# Patient Record
Sex: FEMALE | Race: WHITE | NOT HISPANIC OR LATINO | Employment: FULL TIME | URBAN - METROPOLITAN AREA
[De-identification: names, ages, dates, MRNs, and addresses within clinical notes are randomized per-mention and may not be internally consistent; named-entity substitution may affect disease eponyms.]

---

## 2017-05-10 ENCOUNTER — PATIENT MESSAGE (OUTPATIENT)
Dept: HEALTH INFORMATION MANAGEMENT | Facility: OTHER | Age: 62
End: 2017-05-10

## 2017-05-15 ENCOUNTER — PATIENT OUTREACH (OUTPATIENT)
Dept: HEALTH INFORMATION MANAGEMENT | Facility: OTHER | Age: 62
End: 2017-05-15

## 2017-05-15 NOTE — PROGRESS NOTES
Outcome: Left Message    WebIZ Checked & Epic Updated:  YES / NEEDS TO EST WITH NEW PCP    [facility] Verified: no    Attempt # 1

## 2017-05-25 NOTE — PROGRESS NOTES
Outcome: Left Message    WebIZ Checked & Epic Updated:  yes    [facility] Verified: no    Attempt # 4

## 2018-03-27 ENCOUNTER — OFFICE VISIT (OUTPATIENT)
Dept: MEDICAL GROUP | Age: 63
End: 2018-03-27
Payer: COMMERCIAL

## 2018-03-27 VITALS
SYSTOLIC BLOOD PRESSURE: 148 MMHG | BODY MASS INDEX: 31.71 KG/M2 | HEART RATE: 104 BPM | HEIGHT: 63 IN | OXYGEN SATURATION: 84 % | WEIGHT: 179 LBS | DIASTOLIC BLOOD PRESSURE: 80 MMHG | TEMPERATURE: 98.1 F

## 2018-03-27 DIAGNOSIS — E78.00 PURE HYPERCHOLESTEROLEMIA: ICD-10-CM

## 2018-03-27 DIAGNOSIS — L82.0 INFLAMED SEBORRHEIC KERATOSIS: ICD-10-CM

## 2018-03-27 DIAGNOSIS — Z00.00 PREVENTATIVE HEALTH CARE: ICD-10-CM

## 2018-03-27 DIAGNOSIS — G43.601 PERSISTENT MIGRAINE AURA WITH CEREBRAL INFARCTION AND STATUS MIGRAINOSUS, NOT INTRACTABLE (HCC): Chronic | ICD-10-CM

## 2018-03-27 DIAGNOSIS — Z12.31 VISIT FOR SCREENING MAMMOGRAM: ICD-10-CM

## 2018-03-27 DIAGNOSIS — J32.9 RECURRENT SINUSITIS: ICD-10-CM

## 2018-03-27 DIAGNOSIS — L57.0 AK (ACTINIC KERATOSIS): ICD-10-CM

## 2018-03-27 DIAGNOSIS — R03.0 ELEVATED BP WITHOUT DIAGNOSIS OF HYPERTENSION: ICD-10-CM

## 2018-03-27 DIAGNOSIS — M54.30 SCIATICA, UNSPECIFIED LATERALITY: ICD-10-CM

## 2018-03-27 DIAGNOSIS — H54.40 BLINDNESS, ONE EYE: ICD-10-CM

## 2018-03-27 DIAGNOSIS — I63.9 PERSISTENT MIGRAINE AURA WITH CEREBRAL INFARCTION AND STATUS MIGRAINOSUS, NOT INTRACTABLE (HCC): Chronic | ICD-10-CM

## 2018-03-27 PROCEDURE — 17003 DESTRUCT PREMALG LES 2-14: CPT | Performed by: FAMILY MEDICINE

## 2018-03-27 PROCEDURE — 99204 OFFICE O/P NEW MOD 45 MIN: CPT | Mod: 25 | Performed by: FAMILY MEDICINE

## 2018-03-27 PROCEDURE — 17000 DESTRUCT PREMALG LESION: CPT | Mod: 59 | Performed by: FAMILY MEDICINE

## 2018-03-27 PROCEDURE — 17110 DESTRUCTION B9 LES UP TO 14: CPT | Performed by: FAMILY MEDICINE

## 2018-03-27 RX ORDER — DIAZEPAM 5 MG/1
TABLET ORAL
Qty: 2 TAB | Refills: 0 | Status: SHIPPED | OUTPATIENT
Start: 2018-03-27 | End: 2018-04-27

## 2018-03-27 RX ORDER — CETIRIZINE HYDROCHLORIDE 10 MG/1
10 TABLET ORAL DAILY
Qty: 90 TAB | Refills: 1 | Status: SHIPPED | OUTPATIENT
Start: 2018-03-27 | End: 2018-07-16 | Stop reason: SDUPTHER

## 2018-03-27 RX ORDER — PREDNISONE 20 MG/1
TABLET ORAL
Qty: 12 TAB | Refills: 0 | Status: SHIPPED | OUTPATIENT
Start: 2018-03-27 | End: 2018-10-02

## 2018-03-27 ASSESSMENT — PATIENT HEALTH QUESTIONNAIRE - PHQ9
CLINICAL INTERPRETATION OF PHQ2 SCORE: 3
5. POOR APPETITE OR OVEREATING: 0 - NOT AT ALL
SUM OF ALL RESPONSES TO PHQ QUESTIONS 1-9: 9

## 2018-03-27 NOTE — ASSESSMENT & PLAN NOTE
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

## 2018-03-27 NOTE — PROGRESS NOTES
This medical record contains text that has been entered with the assistance of computer voice recognition and dictation software.  Therefore, it may contain unintended errors in text, spelling, punctuation, or grammar    Chief Complaint   Patient presents with   • Establish Care       Kyra Funez is a 62 y.o. 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 She denies any chest pain or back pain, headaches no change in vision or numbness or tingling anywhere.    Persistent migraine aura with cerebral infarction  She states that it depends on lighting  Maybe once per month she will suffer from an attack  She takes motrin and goes to a dark room  He she's black dots prior to attack    Recurrent sinusitis  States that she takes flonase and allegra daily       Visit for screening mammogram  Cannot afford health care,     Inflamed seborrheic keratosis  The patient is complaining of raised plaques which often it caught in her closing, she often scratches and a bleed and scab up.    Preventative health care  The patient is a 62-year-old female who presents to clinic to [facility] care. She has a significant past medical history is chronic low back pain with severe sciatica, migraines, obesity, left eye blindness, recurrent sinusitis, retinopathy.   The patient denied any chest pain, no sob, no salguero, no  pnd, no orthopnea, no headache, no changes in vision, no numbness or tingling, no nausea, no diarrhea, no abdominal pain, no fevers, no chills, no bright red blood per rectum, no   difficulty urinating, no burning during micturition, no depressed mood, no other concerns.      Current medicines (including changes today)  Current Outpatient Prescriptions   Medication Sig Dispense Refill   • Fexofenadine-Pseudoephedrine (ALLEGRA-D PO) Take  by mouth.     • cetirizine (ZYRTEC) 10 MG Tab Take 1 Tab by mouth every day. 90 Tab 1   • predniSONE (DELTASONE) 20 MG Tab Take 3 tabs po for 2 days then 2 tabs for 2 days then 1 for 2 days 12 Tab 0   • diazePAM (VALIUM) 5 MG Tab Take 1-2 tabs po one prior to MRI 2 Tab 0   • vitamin D (CHOLECALCIFEROL) 1000 UNIT TABS Take 3,000 Units by mouth every day.     • timolol (BETIMOL) 0.5 % ophthalmic solution Place 1 Drop in both eyes 2 times a day. use in affected eye(s)      • fluticasone (FLONASE) 50 MCG/ACT nasal spray USE TWO SPRAY(S) IN EACH NOSTRIL ONCE DAILY 16 Bottle 0   • amoxicillin (AMOXIL) 500 MG Cap Take 1 Cap by mouth 3 times a day. 30 Cap 3     No current facility-administered medications for this visit.      She  has a past medical history of Colitis; Glaucoma (2010); Iritis; Migraine; Neck pain; and Retinopathy. She also has no past medical history of ASTHMA; CAD (coronary artery disease); Cancer (CMS-HCC); Congestive heart failure (CMS-HCC); Diabetes; Hypertension; Infectious disease; Psychiatric disorder; Renal disorder; or Seizure disorder (CMS-HCC).  She  has a past surgical history that includes other and eye surgery.  Social History   Substance Use Topics   • Smoking status: Current Every Day Smoker   • Smokeless tobacco: Never Used      Comment: 10 cigarrettes a day   • Alcohol use No      Comment: celebrations only--newyears     Social History     Social History Narrative   • No narrative on file     Family History   Problem Relation Age of Onset   • Heart Disease Mother    • Heart Disease Father      Family Status   Relation Status   • Mother Alive   • Father    • Brother Alive   • Maternal Grandmother    • Maternal  "Grandfather    • Paternal Grandmother    • Paternal Grandfather          ROS    Please see hpi     All other systems reviewed and are negative     Objective:     Blood pressure 148/80, pulse (!) 104, temperature 36.7 °C (98.1 °F), height 1.6 m (5' 2.99\"), weight 81.2 kg (179 lb), SpO2 (!) 84 %, not currently breastfeeding. Body mass index is 31.72 kg/m².  Physical Exam:    Constitutional: Alert, no distress.  Eye: Equal, round and reactive, conjunctiva clear, lids normal.  ENMT: Lips without lesions, good dentition, oropharynx clear.  Neck: Trachea midline, no masses, no thyromegaly. No cervical or supraclavicular lymphadenopathy.  Respiratory: Unlabored respiratory effort, lungs clear to auscultation, no wheezes, no ronchi.  Cardiovascular: Normal S1, S2, no murmur, no edema.  Abdomen: Soft, non-tender, no masses, no hepatosplenomegaly.  Psych: Alert and oriented x3, normal affect and mood.    BACK Pain exam Thoracic and Lumbar Spine  Inspection of back and posture -revealed a normal exam  Range of motion = 180 degrees bilaterally  Palpation of the spine =NTT  no spasms noted, no lumbar spine tenderness, no saddle anasthesia, able to dorsiflex bilateral toes, 2+DTRs (patellar) bilaterally,  negative straight leg raise bilaterally both supine and seated,  Nontender bilateral erector spinae, normal gait   SKIN EXAM    Skin exam--reveals raised, red dried hemmorhage, plaques on back and arms        multiple lesions on bilateral forearms, hands, and chest, with evidence of of solar damage present , spotty hyperpigmentation, scattered telangiectasias, and  Xerosis      PROCEDURE: CRYOTHERAPY  Discussed risks and benefits of cryotherapy including but not limited to scarring, hyperpigmentation, hypopigmentation, hypertrophic scarring, keiloid scarring, incomplete or no resolution of lesions treated,pain, undesirable cosemetic result, blistering, potential need for additional treatment including " more invasive treatment. Patient expresses understanding and verbally acknowledges risks and consent to treatment. 2  applications of cryotherapy with 3 second freeze thaw cycle was applied to  4AK's and  all irritated and inflamed Seborrheic Keratoses. Patient tolerated procedure well. There were no complications. Aftercare instructions given.            Assessment and Plan:   The following treatment plan was discussed      1. Pure hypercholesterolemia    Need baseline labs    - COMP METABOLIC PANEL; Future  - CBC WITH DIFFERENTIAL; Future  - LIPID PROFILE; Future    2. Elevated BP without diagnosis of hypertension  Patient was given instructions to make a two-week blood pressure log  To measure his blood pressure morning and evening same time  Then send results back to us  We will consider specific management at that time      3. Blindness, one eye  Maintaining  Visits with  ophthalmology    4. Persistent migraine aura with cerebral infarction and status migrainosus, not intractable (CMS-HCC)  Patient has been stable with current management  We will make no changes for now      5. Recurrent sinusitis  I explained the importance of preventing antibiotic resistance  Educated patient that Washing the nasal cavities with saline reduces postnasal drainage, removes secretions, and rinses away allergens and irritants.  Patient was encouraged to use nasal saline rinses prior to using intranasal steroid  Will add Zyrtec and steroid taper   Oral prednisone  20 mg twice daily for five days, followed by 20 mg daily for five days (ie, total of 10 days of treatment).    If symptoms persist will consider antibiotics  And Leukotriene D4 (LTD4) receptor blockers including montelukast or zafirlukast     - cetirizine (ZYRTEC) 10 MG Tab; Take 1 Tab by mouth every day.  Dispense: 90 Tab; Refill: 1  - predniSONE (DELTASONE) 20 MG Tab; Take 3 tabs po for 2 days then 2 tabs for 2 days then 1 for 2 days  Dispense: 12 Tab; Refill: 0    6.  Visit for screening mammogram  Cannot afford for now    7. Sciatica, unspecified laterality        Back pain A/P  Patient informed that overall prognosis of back pain is favorable, he is to use ICE x 2 days, then switch to heat,  Nsaids,  and to ease back into normal activity as quickly as possible,  also to use proper lifting techniques (use legs not back), no clinical indication for imaging. Also counseled patient on low back stretching, hamstring stretching, core strengthening once no longer in acute pain.     - MR-LUMBAR SPINE-WITH & W/O; Future  - diazePAM (VALIUM) 5 MG Tab; Take 1-2 tabs po one prior to MRI  Dispense: 2 Tab; Refill: 0    8. Inflamed seborrheic keratosis  Patient tollerrated procedure well  There were no adverse events  Patient was given post procedure precautions       9. AK (actinic keratosis)  Patient tollerrated procedure well  There were no adverse events  Patient was given post procedure precautions       10. Preventative health care  Care has been established  We need baseline labs to establish a clinical profile  We will then consider daily prophylactic aspirin   In order to weigh  the benefits vs risk of GI bleed    We reviewed USPSTF guidelines        The decision to initiate low-dose aspirin use for the primary prevention of CVD and CRC in adults aged 60 to 69 years who have a 10% or greater 10-year CVD risk should be an individual one      This patient is due for mammogram  Up to date on colonoscopy   Requested Medical records to be sent to us  Denies intimate partner viloence            HEALTH MAINTENANCE:    Instructed to Follow up in clinic or ER for worsening symptoms, difficulty breathing, lack of expected recovery, or should new symptoms or problems arise.    Followup: Return in about 2 months (around 5/27/2018) for Reevaluation.       Once again this medical record contains text that has been entered with the assistance of computer voice recognition and dictation software.   Therefore, it may contain unintended errors in text, spelling, punctuation, or grammar

## 2018-03-27 NOTE — ASSESSMENT & PLAN NOTE
The patient is complaining of raised plaques which often it caught in her closing, she often scratches and a bleed and scab up.

## 2018-03-27 NOTE — ASSESSMENT & PLAN NOTE
She states that it depends on lighting  Maybe once per month she will suffer from an attack  She takes motrin and goes to a dark room  He she's black dots prior to attack

## 2018-03-27 NOTE — ASSESSMENT & PLAN NOTE
The patient is a 62-year-old female who presents to clinic to establish care. She has a significant past medical history is chronic low back pain with severe sciatica, migraines, obesity, left eye blindness, recurrent sinusitis, retinopathy.   The patient denied any chest pain, no sob, no salguero, no  pnd, no orthopnea, no headache, no changes in vision, no numbness or tingling, no nausea, no diarrhea, no abdominal pain, no fevers, no chills, no bright red blood per rectum, no  difficulty urinating, no burning during micturition, no depressed mood, no other concerns.

## 2018-03-27 NOTE — ASSESSMENT & PLAN NOTE
She states that she is always nervious at the doctors office and that's whey bp and hr are elevated. She denies any chest pain or back pain, headaches no change in vision or numbness or tingling anywhere.

## 2018-04-24 ENCOUNTER — APPOINTMENT (OUTPATIENT)
Dept: RADIOLOGY | Facility: MEDICAL CENTER | Age: 63
End: 2018-04-24
Attending: FAMILY MEDICINE
Payer: COMMERCIAL

## 2018-05-05 ENCOUNTER — HOSPITAL ENCOUNTER (OUTPATIENT)
Dept: LAB | Facility: MEDICAL CENTER | Age: 63
End: 2018-05-05
Attending: FAMILY MEDICINE
Payer: COMMERCIAL

## 2018-05-05 DIAGNOSIS — E78.00 PURE HYPERCHOLESTEROLEMIA: ICD-10-CM

## 2018-05-05 LAB
ALBUMIN SERPL BCP-MCNC: 3.5 G/DL (ref 3.2–4.9)
ALBUMIN/GLOB SERPL: 1 G/DL
ALP SERPL-CCNC: 79 U/L (ref 30–99)
ALT SERPL-CCNC: 15 U/L (ref 2–50)
ANION GAP SERPL CALC-SCNC: 8 MMOL/L (ref 0–11.9)
AST SERPL-CCNC: 17 U/L (ref 12–45)
BASOPHILS # BLD AUTO: 1 % (ref 0–1.8)
BASOPHILS # BLD: 0.07 K/UL (ref 0–0.12)
BILIRUB SERPL-MCNC: 0.7 MG/DL (ref 0.1–1.5)
BUN SERPL-MCNC: 15 MG/DL (ref 8–22)
CALCIUM SERPL-MCNC: 8.9 MG/DL (ref 8.5–10.5)
CHLORIDE SERPL-SCNC: 102 MMOL/L (ref 96–112)
CHOLEST SERPL-MCNC: 176 MG/DL (ref 100–199)
CO2 SERPL-SCNC: 32 MMOL/L (ref 20–33)
CREAT SERPL-MCNC: 0.74 MG/DL (ref 0.5–1.4)
EOSINOPHIL # BLD AUTO: 0.22 K/UL (ref 0–0.51)
EOSINOPHIL NFR BLD: 3 % (ref 0–6.9)
ERYTHROCYTE [DISTWIDTH] IN BLOOD BY AUTOMATED COUNT: 42.3 FL (ref 35.9–50)
GLOBULIN SER CALC-MCNC: 3.4 G/DL (ref 1.9–3.5)
GLUCOSE SERPL-MCNC: 83 MG/DL (ref 65–99)
HCT VFR BLD AUTO: 51 % (ref 37–47)
HDLC SERPL-MCNC: 58 MG/DL
HGB BLD-MCNC: 15.8 G/DL (ref 12–16)
IMM GRANULOCYTES # BLD AUTO: 0.01 K/UL (ref 0–0.11)
IMM GRANULOCYTES NFR BLD AUTO: 0.1 % (ref 0–0.9)
LDLC SERPL CALC-MCNC: 101 MG/DL
LYMPHOCYTES # BLD AUTO: 1.8 K/UL (ref 1–4.8)
LYMPHOCYTES NFR BLD: 24.5 % (ref 22–41)
MCH RBC QN AUTO: 29 PG (ref 27–33)
MCHC RBC AUTO-ENTMCNC: 31 G/DL (ref 33.6–35)
MCV RBC AUTO: 93.6 FL (ref 81.4–97.8)
MONOCYTES # BLD AUTO: 0.68 K/UL (ref 0–0.85)
MONOCYTES NFR BLD AUTO: 9.3 % (ref 0–13.4)
NEUTROPHILS # BLD AUTO: 4.56 K/UL (ref 2–7.15)
NEUTROPHILS NFR BLD: 62.1 % (ref 44–72)
NRBC # BLD AUTO: 0 K/UL
NRBC BLD-RTO: 0 /100 WBC
PLATELET # BLD AUTO: 287 K/UL (ref 164–446)
PMV BLD AUTO: 10.4 FL (ref 9–12.9)
POTASSIUM SERPL-SCNC: 4.3 MMOL/L (ref 3.6–5.5)
PROT SERPL-MCNC: 6.9 G/DL (ref 6–8.2)
RBC # BLD AUTO: 5.45 M/UL (ref 4.2–5.4)
SODIUM SERPL-SCNC: 142 MMOL/L (ref 135–145)
TRIGL SERPL-MCNC: 83 MG/DL (ref 0–149)
WBC # BLD AUTO: 7.3 K/UL (ref 4.8–10.8)

## 2018-05-05 PROCEDURE — 80061 LIPID PANEL: CPT

## 2018-05-05 PROCEDURE — 80053 COMPREHEN METABOLIC PANEL: CPT

## 2018-05-05 PROCEDURE — 36415 COLL VENOUS BLD VENIPUNCTURE: CPT

## 2018-05-05 PROCEDURE — 85025 COMPLETE CBC W/AUTO DIFF WBC: CPT

## 2018-05-09 ENCOUNTER — HOSPITAL ENCOUNTER (OUTPATIENT)
Dept: RADIOLOGY | Facility: MEDICAL CENTER | Age: 63
End: 2018-05-09
Attending: FAMILY MEDICINE
Payer: COMMERCIAL

## 2018-05-09 DIAGNOSIS — M54.30 SCIATICA, UNSPECIFIED LATERALITY: ICD-10-CM

## 2018-05-09 PROCEDURE — 72158 MRI LUMBAR SPINE W/O & W/DYE: CPT

## 2018-05-09 PROCEDURE — A9579 GAD-BASE MR CONTRAST NOS,1ML: HCPCS | Performed by: FAMILY MEDICINE

## 2018-05-09 PROCEDURE — 700117 HCHG RX CONTRAST REV CODE 255: Performed by: FAMILY MEDICINE

## 2018-05-09 RX ADMIN — GADODIAMIDE 18 ML: 287 INJECTION INTRAVENOUS at 16:01

## 2018-06-19 ENCOUNTER — OFFICE VISIT (OUTPATIENT)
Dept: MEDICAL GROUP | Age: 63
End: 2018-06-19
Payer: COMMERCIAL

## 2018-06-19 VITALS
SYSTOLIC BLOOD PRESSURE: 144 MMHG | TEMPERATURE: 99.5 F | DIASTOLIC BLOOD PRESSURE: 64 MMHG | HEART RATE: 101 BPM | OXYGEN SATURATION: 88 % | BODY MASS INDEX: 31.71 KG/M2 | HEIGHT: 63 IN | WEIGHT: 179 LBS

## 2018-06-19 DIAGNOSIS — L82.0 INFLAMED SEBORRHEIC KERATOSIS: ICD-10-CM

## 2018-06-19 DIAGNOSIS — F17.210 SMOKING GREATER THAN 30 PACK YEARS: ICD-10-CM

## 2018-06-19 DIAGNOSIS — L57.0 AK (ACTINIC KERATOSIS): ICD-10-CM

## 2018-06-19 DIAGNOSIS — M54.32 SCIATICA OF LEFT SIDE: ICD-10-CM

## 2018-06-19 PROCEDURE — 99214 OFFICE O/P EST MOD 30 MIN: CPT | Mod: 25 | Performed by: FAMILY MEDICINE

## 2018-06-19 PROCEDURE — 17003 DESTRUCT PREMALG LES 2-14: CPT | Performed by: FAMILY MEDICINE

## 2018-06-19 PROCEDURE — 17000 DESTRUCT PREMALG LESION: CPT | Mod: 59 | Performed by: FAMILY MEDICINE

## 2018-06-19 PROCEDURE — 17110 DESTRUCTION B9 LES UP TO 14: CPT | Performed by: FAMILY MEDICINE

## 2018-06-19 NOTE — ASSESSMENT & PLAN NOTE
She states the sciatica is really affecting her life.  It is worse when she is walking it is also painful when she sitting down improves with standing up.  The pain is about 5 out of 10 starts at the L4-L5 lumbar region radiates down the right gluteus sae.  He denies any loss of bladder or bowel function no numbness or tingling.  The pain is so bad that she will be open to surgery.  MRI does reveal moderate to severe canal stenosis.  Please see below    posterior paraspinous soft tissues are grossly unremarkable.   Impression       1.  No acute abnormality or abnormal enhancement lumbar spine.  2.  Advanced subacute to chronic degenerative changes at the L4-5 level secondary to spondylolisthesis which results in mild central canal stenosis, moderate right and moderate to severe left neural foraminal stenosis.  3.  Additional multilevel degenerative changes of the lumbar spine as described above.   Reading Provider Reading Date   Luca Villarreal M.D. May 9, 2018

## 2018-06-19 NOTE — ASSESSMENT & PLAN NOTE
Patient states that the irritating raised plaques on her stomach back and thorax have improved most of fallen off but she has several more that she needs to have removed.  They often get caught in her bra they often bleeds when scratched.

## 2018-06-19 NOTE — PROGRESS NOTES
This medical record contains text that has been entered with the assistance of computer voice recognition and dictation software.  Therefore, it may contain unintended errors in text, spelling, punctuation, or grammar    Chief Complaint   Patient presents with   • Results         Kyra Funez is a 63 y.o. female here evaluation and management of: Sciatica,      HPI:     Inflamed seborrheic keratosis  Patient states that the irritating raised plaques on her stomach back and thorax have improved most of fallen off but she has several more that she needs to have removed.  They often get caught in her bra they often bleeds when scratched.    Sciatica of left side  She states the sciatica is really affecting her life.  It is worse when she is walking it is also painful when she sitting down improves with standing up.  The pain is about 5 out of 10 starts at the L4-L5 lumbar region radiates down the right gluteus sae.  He denies any loss of bladder or bowel function no numbness or tingling.  The pain is so bad that she will be open to surgery.  MRI does reveal moderate to severe canal stenosis.  Please see below    posterior paraspinous soft tissues are grossly unremarkable.   Impression       1.  No acute abnormality or abnormal enhancement lumbar spine.  2.  Advanced subacute to chronic degenerative changes at the L4-5 level secondary to spondylolisthesis which results in mild central canal stenosis, moderate right and moderate to severe left neural foraminal stenosis.  3.  Additional multilevel degenerative changes of the lumbar spine as described above.   Reading Provider Reading Date   Luca Villarreal M.D. May 9, 2018         Smoking greater than 30 pack years  Patient continues to smoke she knows it is not good for her health but she cannot stop.  Denies any hemoptysis no unintentional weight loss.  Currently she is smoking half a day she has been for over 39 years.    Current medicines (including changes  today)  Current Outpatient Prescriptions   Medication Sig Dispense Refill   • Fexofenadine-Pseudoephedrine (ALLEGRA-D PO) Take  by mouth.     • cetirizine (ZYRTEC) 10 MG Tab Take 1 Tab by mouth every day. 90 Tab 1   • vitamin D (CHOLECALCIFEROL) 1000 UNIT TABS Take 3,000 Units by mouth every day.     • timolol (BETIMOL) 0.5 % ophthalmic solution Place 1 Drop in both eyes 2 times a day. use in affected eye(s)      • predniSONE (DELTASONE) 20 MG Tab Take 3 tabs po for 2 days then 2 tabs for 2 days then 1 for 2 days 12 Tab 0   • fluticasone (FLONASE) 50 MCG/ACT nasal spray USE TWO SPRAY(S) IN EACH NOSTRIL ONCE DAILY 16 Bottle 0   • amoxicillin (AMOXIL) 500 MG Cap Take 1 Cap by mouth 3 times a day. 30 Cap 3     No current facility-administered medications for this visit.      She  has a past medical history of Colitis; Glaucoma (2010); Iritis; Migraine; Neck pain; and Retinopathy. She also has no past medical history of ASTHMA; CAD (coronary artery disease); Cancer (HCC); Congestive heart failure (HCC); Diabetes; Hypertension; Infectious disease; Psychiatric disorder; Renal disorder; or Seizure disorder ([facility]).  She  has a past surgical history that includes other and eye surgery.  Social History   Substance Use Topics   • Smoking status: Current Every Day Smoker     Packs/day: 0.50   • Smokeless tobacco: Never Used      Comment: 17 cigarrettes a day   • Alcohol use No      Comment: celebrations only--newyears     Social History     Social History Narrative   • No narrative on file     Family History   Problem Relation Age of Onset   • Heart Disease Mother    • Heart Disease Father      Family Status   Relation Status   • Mother Alive   • Father    • Brother Alive   • Maternal Grandmother    • Maternal Grandfather    • Paternal Grandmother    • Paternal Grandfather          ROS    Please see hpi     All other systems reviewed and are negative     Objective:     Blood pressure  "144/64, pulse (!) 101, temperature 37.5 °C (99.5 °F), height 1.6 m (5' 3\"), weight 81.2 kg (179 lb), SpO2 88 %, not currently breastfeeding. Body mass index is 31.71 kg/m².  Physical Exam:    Constitutional: Alert, no distress.  Skin: Warm, dry, good turgor, no rashes in visible areas.  Eye: Equal, round and reactive, conjunctiva clear, lids normal.  ENMT: Lips without lesions, good dentition, oropharynx clear.  Neck: Trachea midline, no masses, no thyromegaly. No cervical or supraclavicular lymphadenopathy.  Respiratory: Unlabored respiratory effort, lungs clear to auscultation, no wheezes, no ronchi.  Cardiovascular: Normal S1, S2, no murmur, no edema.  Abdomen: Soft, non-tender, no masses, no hepatosplenomegaly.  Psych: Alert and oriented x3, normal affect and mood.      SKIN EXAM    ISK  Description--Several irregular, pigmented, verrucous surface plaques with dried hemmorhage      Size--greater than 0.3 cm on stomach thorax and back    SymptomsPatient has been complaining of lesions which have been irritating, flaking,getting caught on clothing and jewelry, painful and causing discomfort so is interested in removal.      AK  multiple lesions on face  and chest, with evidence of of solar damage present , spotty hyperpigmentation, scattered telangiectasias, and  Xerosis      PROCEDURE: CRYOTHERAPY  Discussed risks and benefits of cryotherapy including but not limited to scarring, hyperpigmentation, hypopigmentation, hypertrophic scarring, keiloid scarring, incomplete or no resolution of lesions treated,pain, undesirable cosemetic result, blistering, potential need for additional treatment including more invasive treatment. Patient expresses understanding and verbally acknowledges risks and consent to treatment. 2  applications of cryotherapy with 3 second freeze thaw cycle was applied to  3AK's and  all irritated and inflamed Seborrheic Keratoses. Patient tolerated procedure well. There were no complications. " Aftercare instructions given.          Assessment and Plan:   The following treatment plan was discussed      1. AK (actinic keratosis)  Patient tollerrated procedure well  There were no adverse events  Patient was given post procedure precautions       2. Inflamed seborrheic keratosis  Patient tollerrated procedure well  There were no adverse events  Patient was given post procedure precautions       3. Sciatica of left side  I believe that she might benefit from epidural steroid injections if not surgical correction.    - REFERRAL TO NEUROSURGERY    4. Smoking greater than 30 pack years      Patient was counseled on smoking cessation, we discussed the benefits of quitting including decrease risk of MI by 50% after one year of cessation, decreased risk of lung cancer after 12 years, one cigarette is enough to paralyze cilia for 24 hours leading to increase risk of pulmonary infection, etc.... .Also encouraged to set a stop date.    Lung cancer screening program also ordered    - US-ABDOMINAL AORTA SCREEN AAA; Future        HEALTH MAINTENANCE:    Instructed to Follow up in clinic or ER for worsening symptoms, difficulty breathing, lack of expected recovery, or should new symptoms or problems arise.    Followup: Return in about 3 months (around 9/19/2018) for Reevaluation.       Once again this medical record contains text that has been entered with the assistance of computer voice recognition and dictation software.  Therefore, it may contain unintended errors in text, spelling, punctuation, or grammar

## 2018-06-19 NOTE — ASSESSMENT & PLAN NOTE
Patient continues to smoke she knows it is not good for her health but she cannot stop.  Denies any hemoptysis no unintentional weight loss.  Currently she is smoking half a day she has been for over 39 years.

## 2018-06-26 ENCOUNTER — TELEPHONE (OUTPATIENT)
Dept: HEMATOLOGY ONCOLOGY | Facility: MEDICAL CENTER | Age: 63
End: 2018-06-26

## 2018-06-26 NOTE — TELEPHONE ENCOUNTER
Need additional information. 1st attempt to contact the patient,- left voicemail for patient requesting a return call to verify eligibility for LCSP.

## 2018-07-10 ENCOUNTER — TELEPHONE (OUTPATIENT)
Dept: MEDICAL GROUP | Age: 63
End: 2018-07-10

## 2018-07-10 ENCOUNTER — HOSPITAL ENCOUNTER (OUTPATIENT)
Dept: RADIOLOGY | Facility: MEDICAL CENTER | Age: 63
End: 2018-07-10
Attending: FAMILY MEDICINE
Payer: COMMERCIAL

## 2018-07-10 DIAGNOSIS — F17.210 SMOKING GREATER THAN 30 PACK YEARS: ICD-10-CM

## 2018-07-10 PROCEDURE — 76775 US EXAM ABDO BACK WALL LIM: CPT

## 2018-07-10 NOTE — TELEPHONE ENCOUNTER
Phone Number Called: [phone] (home)       Message: LVM for patient that we received her Ultrasound results and to call back to inform her of results.    Left Message for patient to call back: yes

## 2018-07-11 NOTE — TELEPHONE ENCOUNTER
Phone Number Called: [phone] (home) [phone] (work)      Message: Patient called in to MA line this morning, returning phone call she had received. . EPIC system was down, informed patient would contact her when system was up and running. Per patient okay to leave detail message. LVM for patient to inform of Dr. Addison result note:     Notes recorded by Demetri Britt M.D. on 7/10/2018 at 11:22 AM PDT  Please call patient and advise that his US did not show an anneurysm.    Demetri Addison MD  [place] [id number]    Patient also wanted refill on Allergy med, after looking at patient med list, she has 2 meds. LVM for her to call back an confirm which medication she is requesting for allergies.      Left Message for patient to call back: yes and N\A

## 2018-07-11 NOTE — TELEPHONE ENCOUNTER
Need additional information. 2nd attempt to contact the patient,- left voicemail for patient requesting a return call to verify eligibility for LCSP.

## 2018-07-16 DIAGNOSIS — J32.9 RECURRENT SINUSITIS: ICD-10-CM

## 2018-07-16 NOTE — TELEPHONE ENCOUNTER
Need additional information. 3rd attempt to contact the patient,- left voicemail and mailed a letter to the patient requesting a return call to verify eligibility for LCSP.

## 2018-07-17 RX ORDER — CETIRIZINE HYDROCHLORIDE 10 MG/1
10 TABLET ORAL DAILY
Qty: 90 TAB | Refills: 1 | Status: SHIPPED | OUTPATIENT
Start: 2018-07-17 | End: 2019-06-11 | Stop reason: SDUPTHER

## 2018-07-27 ENCOUNTER — TELEPHONE (OUTPATIENT)
Dept: MEDICAL GROUP | Age: 63
End: 2018-07-27

## 2018-07-27 DIAGNOSIS — M48.061 NEURAL FORAMINAL STENOSIS OF LUMBAR SPINE: ICD-10-CM

## 2018-07-27 NOTE — TELEPHONE ENCOUNTER
1. Caller Name: Kyra Funez                                           Call Back Number: [phone] (home) [phone] (work)      Patient approves a detailed voicemail message: yes    2. SPECIFIC Action To Be Taken: Referral pending, please sign.    3. Diagnosis/Clinical Reason for Request: Lumbar spine pain    4. Specialty & Provider Name/Lab/Imaging Location: Dr. Rica Waldrop (neurosurgery), [facility]    5. Is appointment scheduled for requested order/referral: yes - 10/1/18    Patient was informed they will receive a return phone call from the office ONLY if there are any questions before processing their request. Advised to call back if they haven't received a call from the referral department in 5 days.

## 2018-08-18 ENCOUNTER — HOSPITAL ENCOUNTER (OUTPATIENT)
Dept: RADIOLOGY | Facility: MEDICAL CENTER | Age: 63
End: 2018-08-18
Attending: PAIN MEDICINE
Payer: COMMERCIAL

## 2018-08-18 DIAGNOSIS — M54.5 LOW BACK PAIN, UNSPECIFIED BACK PAIN LATERALITY, UNSPECIFIED CHRONICITY, WITH SCIATICA PRESENCE UNSPECIFIED: ICD-10-CM

## 2018-08-18 PROCEDURE — 72110 X-RAY EXAM L-2 SPINE 4/>VWS: CPT

## 2018-10-02 ENCOUNTER — HOSPITAL ENCOUNTER (INPATIENT)
Facility: MEDICAL CENTER | Age: 63
LOS: 7 days | DRG: 189 | End: 2018-10-09
Attending: EMERGENCY MEDICINE | Admitting: HOSPITALIST
Payer: COMMERCIAL

## 2018-10-02 ENCOUNTER — OFFICE VISIT (OUTPATIENT)
Dept: URGENT CARE | Facility: CLINIC | Age: 63
End: 2018-10-02
Payer: COMMERCIAL

## 2018-10-02 ENCOUNTER — APPOINTMENT (OUTPATIENT)
Dept: RADIOLOGY | Facility: MEDICAL CENTER | Age: 63
DRG: 189 | End: 2018-10-02
Attending: EMERGENCY MEDICINE
Payer: COMMERCIAL

## 2018-10-02 VITALS
HEART RATE: 97 BPM | OXYGEN SATURATION: 80 % | RESPIRATION RATE: 20 BRPM | BODY MASS INDEX: 33.84 KG/M2 | HEIGHT: 63 IN | WEIGHT: 191 LBS | DIASTOLIC BLOOD PRESSURE: 84 MMHG | TEMPERATURE: 98.7 F | SYSTOLIC BLOOD PRESSURE: 132 MMHG

## 2018-10-02 DIAGNOSIS — J44.9 CHRONIC OBSTRUCTIVE PULMONARY DISEASE, UNSPECIFIED COPD TYPE (HCC): ICD-10-CM

## 2018-10-02 DIAGNOSIS — R09.02 HYPOXIA: ICD-10-CM

## 2018-10-02 DIAGNOSIS — J44.1 ACUTE EXACERBATION OF CHRONIC OBSTRUCTIVE PULMONARY DISEASE (COPD) (HCC): ICD-10-CM

## 2018-10-02 DIAGNOSIS — R10.84 GENERALIZED ABDOMINAL PAIN: ICD-10-CM

## 2018-10-02 DIAGNOSIS — I50.9 CONGESTIVE HEART FAILURE, UNSPECIFIED HF CHRONICITY, UNSPECIFIED HEART FAILURE TYPE (HCC): ICD-10-CM

## 2018-10-02 PROBLEM — J96.21 ACUTE ON CHRONIC RESPIRATORY FAILURE WITH HYPOXEMIA (HCC): Status: ACTIVE | Noted: 2018-10-02

## 2018-10-02 LAB
ALBUMIN SERPL BCP-MCNC: 3.4 G/DL (ref 3.2–4.9)
ALBUMIN/GLOB SERPL: 1 G/DL
ALP SERPL-CCNC: 95 U/L (ref 30–99)
ALT SERPL-CCNC: 34 U/L (ref 2–50)
ANION GAP SERPL CALC-SCNC: 11 MMOL/L (ref 0–11.9)
APPEARANCE UR: ABNORMAL
AST SERPL-CCNC: 27 U/L (ref 12–45)
BASOPHILS # BLD AUTO: 0.5 % (ref 0–1.8)
BASOPHILS # BLD: 0.06 K/UL (ref 0–0.12)
BILIRUB SERPL-MCNC: 1.2 MG/DL (ref 0.1–1.5)
BILIRUB UR STRIP-MCNC: NEGATIVE MG/DL
BNP SERPL-MCNC: 525 PG/ML (ref 0–100)
BUN SERPL-MCNC: 12 MG/DL (ref 8–22)
CALCIUM SERPL-MCNC: 8.8 MG/DL (ref 8.4–10.2)
CHLORIDE SERPL-SCNC: 97 MMOL/L (ref 96–112)
CO2 SERPL-SCNC: 28 MMOL/L (ref 20–33)
COLOR UR AUTO: ABNORMAL
CREAT SERPL-MCNC: 0.88 MG/DL (ref 0.5–1.4)
EOSINOPHIL # BLD AUTO: 0.07 K/UL (ref 0–0.51)
EOSINOPHIL NFR BLD: 0.6 % (ref 0–6.9)
ERYTHROCYTE [DISTWIDTH] IN BLOOD BY AUTOMATED COUNT: 45.1 FL (ref 35.9–50)
FLUAV RNA SPEC QL NAA+PROBE: NEGATIVE
FLUAV+FLUBV AG SPEC QL IA: NORMAL
FLUBV RNA SPEC QL NAA+PROBE: NEGATIVE
GLOBULIN SER CALC-MCNC: 3.5 G/DL (ref 1.9–3.5)
GLUCOSE SERPL-MCNC: 100 MG/DL (ref 65–99)
GLUCOSE UR STRIP.AUTO-MCNC: NEGATIVE MG/DL
HCT VFR BLD AUTO: 49.3 % (ref 37–47)
HGB BLD-MCNC: 15.6 G/DL (ref 12–16)
IMM GRANULOCYTES # BLD AUTO: 0.05 K/UL (ref 0–0.11)
IMM GRANULOCYTES NFR BLD AUTO: 0.4 % (ref 0–0.9)
KETONES UR STRIP.AUTO-MCNC: NEGATIVE MG/DL
LACTATE BLD-SCNC: 1.6 MMOL/L (ref 0.5–2)
LACTATE BLD-SCNC: 1.8 MMOL/L (ref 0.5–2)
LACTATE BLD-SCNC: 2.2 MMOL/L (ref 0.5–2)
LACTATE BLD-SCNC: 2.5 MMOL/L (ref 0.5–2)
LEUKOCYTE ESTERASE UR QL STRIP.AUTO: NEGATIVE
LYMPHOCYTES # BLD AUTO: 1.59 K/UL (ref 1–4.8)
LYMPHOCYTES NFR BLD: 12.9 % (ref 22–41)
MAGNESIUM SERPL-MCNC: 2.1 MG/DL (ref 1.5–2.5)
MCH RBC QN AUTO: 30 PG (ref 27–33)
MCHC RBC AUTO-ENTMCNC: 31.6 G/DL (ref 33.6–35)
MCV RBC AUTO: 94.8 FL (ref 81.4–97.8)
MONOCYTES # BLD AUTO: 1.03 K/UL (ref 0–0.85)
MONOCYTES NFR BLD AUTO: 8.4 % (ref 0–13.4)
NEUTROPHILS # BLD AUTO: 9.53 K/UL (ref 2–7.15)
NEUTROPHILS NFR BLD: 77.2 % (ref 44–72)
NITRITE UR QL STRIP.AUTO: NEGATIVE
NRBC # BLD AUTO: 0.02 K/UL
NRBC BLD-RTO: 0.2 /100 WBC
PH UR STRIP.AUTO: 6 [PH] (ref 5–8)
PLATELET # BLD AUTO: 263 K/UL (ref 164–446)
PMV BLD AUTO: 10.1 FL (ref 9–12.9)
POTASSIUM SERPL-SCNC: 4.1 MMOL/L (ref 3.6–5.5)
PROCALCITONIN SERPL-MCNC: <0.05 NG/ML
PROT SERPL-MCNC: 6.9 G/DL (ref 6–8.2)
PROT UR QL STRIP: 100 MG/DL
RBC # BLD AUTO: 5.2 M/UL (ref 4.2–5.4)
RBC UR QL AUTO: ABNORMAL
SIGNIFICANT IND 70042: NORMAL
SITE SITE: NORMAL
SODIUM SERPL-SCNC: 136 MMOL/L (ref 135–145)
SOURCE SOURCE: NORMAL
SP GR UR STRIP.AUTO: 1.03
TSH SERPL DL<=0.005 MIU/L-ACNC: 3.99 UIU/ML (ref 0.38–5.33)
UROBILINOGEN UR STRIP-MCNC: NEGATIVE MG/DL
WBC # BLD AUTO: 12.3 K/UL (ref 4.8–10.8)

## 2018-10-02 PROCEDURE — 87070 CULTURE OTHR SPECIMN AEROBIC: CPT

## 2018-10-02 PROCEDURE — 87040 BLOOD CULTURE FOR BACTERIA: CPT | Mod: 91

## 2018-10-02 PROCEDURE — 700101 HCHG RX REV CODE 250: Performed by: HOSPITALIST

## 2018-10-02 PROCEDURE — 94640 AIRWAY INHALATION TREATMENT: CPT

## 2018-10-02 PROCEDURE — A9270 NON-COVERED ITEM OR SERVICE: HCPCS | Performed by: HOSPITALIST

## 2018-10-02 PROCEDURE — 84145 PROCALCITONIN (PCT): CPT

## 2018-10-02 PROCEDURE — 700101 HCHG RX REV CODE 250: Performed by: EMERGENCY MEDICINE

## 2018-10-02 PROCEDURE — 83605 ASSAY OF LACTIC ACID: CPT | Mod: 91

## 2018-10-02 PROCEDURE — 94760 N-INVAS EAR/PLS OXIMETRY 1: CPT

## 2018-10-02 PROCEDURE — 83880 ASSAY OF NATRIURETIC PEPTIDE: CPT

## 2018-10-02 PROCEDURE — 700105 HCHG RX REV CODE 258: Performed by: EMERGENCY MEDICINE

## 2018-10-02 PROCEDURE — 87400 INFLUENZA A/B EACH AG IA: CPT

## 2018-10-02 PROCEDURE — 81002 URINALYSIS NONAUTO W/O SCOPE: CPT | Performed by: NURSE PRACTITIONER

## 2018-10-02 PROCEDURE — 80053 COMPREHEN METABOLIC PANEL: CPT

## 2018-10-02 PROCEDURE — 36415 COLL VENOUS BLD VENIPUNCTURE: CPT

## 2018-10-02 PROCEDURE — 83735 ASSAY OF MAGNESIUM: CPT

## 2018-10-02 PROCEDURE — 84443 ASSAY THYROID STIM HORMONE: CPT

## 2018-10-02 PROCEDURE — 87502 INFLUENZA DNA AMP PROBE: CPT

## 2018-10-02 PROCEDURE — 99285 EMERGENCY DEPT VISIT HI MDM: CPT

## 2018-10-02 PROCEDURE — 700105 HCHG RX REV CODE 258: Performed by: HOSPITALIST

## 2018-10-02 PROCEDURE — 700111 HCHG RX REV CODE 636 W/ 250 OVERRIDE (IP): Performed by: EMERGENCY MEDICINE

## 2018-10-02 PROCEDURE — 99215 OFFICE O/P EST HI 40 MIN: CPT | Performed by: NURSE PRACTITIONER

## 2018-10-02 PROCEDURE — 304561 HCHG STAT O2

## 2018-10-02 PROCEDURE — 85025 COMPLETE CBC W/AUTO DIFF WBC: CPT

## 2018-10-02 PROCEDURE — 700102 HCHG RX REV CODE 250 W/ 637 OVERRIDE(OP): Performed by: HOSPITALIST

## 2018-10-02 PROCEDURE — 96374 THER/PROPH/DIAG INJ IV PUSH: CPT

## 2018-10-02 PROCEDURE — 99222 1ST HOSP IP/OBS MODERATE 55: CPT | Performed by: HOSPITALIST

## 2018-10-02 PROCEDURE — 700111 HCHG RX REV CODE 636 W/ 250 OVERRIDE (IP): Performed by: HOSPITALIST

## 2018-10-02 PROCEDURE — 71045 X-RAY EXAM CHEST 1 VIEW: CPT

## 2018-10-02 PROCEDURE — 770020 HCHG ROOM/CARE - TELE (206)

## 2018-10-02 RX ORDER — NICOTINE 21 MG/24HR
21 PATCH, TRANSDERMAL 24 HOURS TRANSDERMAL
Status: DISCONTINUED | OUTPATIENT
Start: 2018-10-02 | End: 2018-10-09 | Stop reason: HOSPADM

## 2018-10-02 RX ORDER — POLYETHYLENE GLYCOL 3350 17 G/17G
1 POWDER, FOR SOLUTION ORAL
Status: DISCONTINUED | OUTPATIENT
Start: 2018-10-02 | End: 2018-10-09 | Stop reason: HOSPADM

## 2018-10-02 RX ORDER — SODIUM CHLORIDE 9 MG/ML
500 INJECTION, SOLUTION INTRAVENOUS
Status: COMPLETED | OUTPATIENT
Start: 2018-10-02 | End: 2018-10-02

## 2018-10-02 RX ORDER — AMOXICILLIN 250 MG
2 CAPSULE ORAL 2 TIMES DAILY
Status: DISCONTINUED | OUTPATIENT
Start: 2018-10-02 | End: 2018-10-09 | Stop reason: HOSPADM

## 2018-10-02 RX ORDER — MULTIVIT-MIN/IRON/FOLIC ACID/K 18-600-40
1 CAPSULE ORAL DAILY
COMMUNITY

## 2018-10-02 RX ORDER — IPRATROPIUM BROMIDE AND ALBUTEROL SULFATE 2.5; .5 MG/3ML; MG/3ML
3 SOLUTION RESPIRATORY (INHALATION)
Status: DISCONTINUED | OUTPATIENT
Start: 2018-10-02 | End: 2018-10-09 | Stop reason: HOSPADM

## 2018-10-02 RX ORDER — TIMOLOL MALEATE 5 MG/ML
1 SOLUTION/ DROPS OPHTHALMIC 2 TIMES DAILY
Status: DISCONTINUED | OUTPATIENT
Start: 2018-10-02 | End: 2018-10-09 | Stop reason: HOSPADM

## 2018-10-02 RX ORDER — METHYLPREDNISOLONE SODIUM SUCCINATE 40 MG/ML
40 INJECTION, POWDER, LYOPHILIZED, FOR SOLUTION INTRAMUSCULAR; INTRAVENOUS EVERY 6 HOURS
Status: DISCONTINUED | OUTPATIENT
Start: 2018-10-02 | End: 2018-10-04

## 2018-10-02 RX ORDER — AZITHROMYCIN 250 MG/1
250 TABLET, FILM COATED ORAL EVERY 24 HOURS
Status: COMPLETED | OUTPATIENT
Start: 2018-10-03 | End: 2018-10-06

## 2018-10-02 RX ORDER — MELOXICAM 7.5 MG/1
7.5 TABLET ORAL DAILY
COMMUNITY
End: 2020-02-25

## 2018-10-02 RX ORDER — BISACODYL 10 MG
10 SUPPOSITORY, RECTAL RECTAL
Status: DISCONTINUED | OUTPATIENT
Start: 2018-10-02 | End: 2018-10-09 | Stop reason: HOSPADM

## 2018-10-02 RX ORDER — IPRATROPIUM BROMIDE AND ALBUTEROL SULFATE 2.5; .5 MG/3ML; MG/3ML
3 SOLUTION RESPIRATORY (INHALATION) ONCE
Status: COMPLETED | OUTPATIENT
Start: 2018-10-02 | End: 2018-10-02

## 2018-10-02 RX ORDER — CETIRIZINE HYDROCHLORIDE 10 MG/1
10 TABLET ORAL DAILY
Status: DISCONTINUED | OUTPATIENT
Start: 2018-10-02 | End: 2018-10-09 | Stop reason: HOSPADM

## 2018-10-02 RX ORDER — AZITHROMYCIN 250 MG/1
500 TABLET, FILM COATED ORAL ONCE
Status: COMPLETED | OUTPATIENT
Start: 2018-10-02 | End: 2018-10-02

## 2018-10-02 RX ORDER — IPRATROPIUM BROMIDE AND ALBUTEROL SULFATE 2.5; .5 MG/3ML; MG/3ML
3 SOLUTION RESPIRATORY (INHALATION)
Status: DISCONTINUED | OUTPATIENT
Start: 2018-10-02 | End: 2018-10-07

## 2018-10-02 RX ADMIN — METHYLPREDNISOLONE SODIUM SUCCINATE 40 MG: 40 INJECTION, POWDER, FOR SOLUTION INTRAMUSCULAR; INTRAVENOUS at 13:37

## 2018-10-02 RX ADMIN — METHYLPREDNISOLONE SODIUM SUCCINATE 40 MG: 40 INJECTION, POWDER, FOR SOLUTION INTRAMUSCULAR; INTRAVENOUS at 17:50

## 2018-10-02 RX ADMIN — SODIUM CHLORIDE 500 ML: 9 INJECTION, SOLUTION INTRAVENOUS at 23:10

## 2018-10-02 RX ADMIN — NICOTINE 21 MG: 21 PATCH, EXTENDED RELEASE TRANSDERMAL at 13:38

## 2018-10-02 RX ADMIN — IPRATROPIUM BROMIDE AND ALBUTEROL SULFATE 3 ML: .5; 3 SOLUTION RESPIRATORY (INHALATION) at 19:34

## 2018-10-02 RX ADMIN — IPRATROPIUM BROMIDE AND ALBUTEROL SULFATE 3 ML: .5; 3 SOLUTION RESPIRATORY (INHALATION) at 22:57

## 2018-10-02 RX ADMIN — SODIUM CHLORIDE 3 G: 900 INJECTION INTRAVENOUS at 12:30

## 2018-10-02 RX ADMIN — METHYLPREDNISOLONE SODIUM SUCCINATE 40 MG: 40 INJECTION, POWDER, FOR SOLUTION INTRAMUSCULAR; INTRAVENOUS at 23:11

## 2018-10-02 RX ADMIN — TIMOLOL MALEATE 1 DROP: 5 SOLUTION OPHTHALMIC at 17:50

## 2018-10-02 RX ADMIN — ENOXAPARIN SODIUM 40 MG: 100 INJECTION SUBCUTANEOUS at 13:37

## 2018-10-02 RX ADMIN — CETIRIZINE HYDROCHLORIDE 10 MG: 10 TABLET, FILM COATED ORAL at 17:50

## 2018-10-02 RX ADMIN — IPRATROPIUM BROMIDE AND ALBUTEROL SULFATE 3 ML: .5; 3 SOLUTION RESPIRATORY (INHALATION) at 10:29

## 2018-10-02 RX ADMIN — SODIUM CHLORIDE 3 G: 900 INJECTION INTRAVENOUS at 17:51

## 2018-10-02 RX ADMIN — AZITHROMYCIN MONOHYDRATE 500 MG: 250 TABLET ORAL at 13:38

## 2018-10-02 RX ADMIN — IPRATROPIUM BROMIDE AND ALBUTEROL SULFATE 3 ML: .5; 3 SOLUTION RESPIRATORY (INHALATION) at 14:08

## 2018-10-02 ASSESSMENT — COGNITIVE AND FUNCTIONAL STATUS - GENERAL
SUGGESTED CMS G CODE MODIFIER DAILY ACTIVITY: CJ
DRESSING REGULAR UPPER BODY CLOTHING: A LITTLE
MOBILITY SCORE: 23
DAILY ACTIVITIY SCORE: 20
SUGGESTED CMS G CODE MODIFIER MOBILITY: CI
HELP NEEDED FOR BATHING: A LITTLE
TOILETING: A LITTLE
CLIMB 3 TO 5 STEPS WITH RAILING: A LITTLE
DRESSING REGULAR LOWER BODY CLOTHING: A LITTLE

## 2018-10-02 ASSESSMENT — ENCOUNTER SYMPTOMS
PND: 0
PHOTOPHOBIA: 0
EYE PAIN: 0
CLAUDICATION: 0
WEAKNESS: 1
FEVER: 0
CHILLS: 0
SHORTNESS OF BREATH: 1
NAUSEA: 0
PALPITATIONS: 0
STRIDOR: 0
HEMOPTYSIS: 0
SORE THROAT: 0
DEPRESSION: 0
BLOOD IN STOOL: 0
HEARTBURN: 0
NECK PAIN: 0
VOMITING: 0
NERVOUS/ANXIOUS: 0
FEVER: 0
MYALGIAS: 0
ABDOMINAL PAIN: 0
SPUTUM PRODUCTION: 1
WHEEZING: 1
HEADACHES: 0
FATIGUE: 1
COUGH: 1
CHILLS: 0
DOUBLE VISION: 0
DIZZINESS: 0
TREMORS: 0
SENSORY CHANGE: 0
TINGLING: 0
BLURRED VISION: 0
CONSTIPATION: 0
COUGH: 1
SHORTNESS OF BREATH: 1
BACK PAIN: 0
VOMITING: 0
ORTHOPNEA: 0
MEMORY LOSS: 0
NAUSEA: 0
SPEECH CHANGE: 0

## 2018-10-02 ASSESSMENT — PAIN SCALES - GENERAL
PAINLEVEL_OUTOF10: 0

## 2018-10-02 ASSESSMENT — COPD QUESTIONNAIRES
HAVE YOU SMOKED AT LEAST 100 CIGARETTES IN YOUR ENTIRE LIFE: YES
DO YOU EVER COUGH UP ANY MUCUS OR PHLEGM?: YES, EVERY DAY
DURING THE PAST 4 WEEKS HOW MUCH DID YOU FEEL SHORT OF BREATH: SOME OF THE TIME
COPD SCREENING SCORE: 8

## 2018-10-02 ASSESSMENT — PATIENT HEALTH QUESTIONNAIRE - PHQ9
1. LITTLE INTEREST OR PLEASURE IN DOING THINGS: NOT AT ALL
2. FEELING DOWN, DEPRESSED, IRRITABLE, OR HOPELESS: NOT AT ALL
SUM OF ALL RESPONSES TO PHQ9 QUESTIONS 1 AND 2: 0
SUM OF ALL RESPONSES TO PHQ9 QUESTIONS 1 AND 2: 0
1. LITTLE INTEREST OR PLEASURE IN DOING THINGS: NOT AT ALL
2. FEELING DOWN, DEPRESSED, IRRITABLE, OR HOPELESS: NOT AT ALL

## 2018-10-02 ASSESSMENT — LIFESTYLE VARIABLES
EVER_SMOKED: YES
ALCOHOL_USE: NO

## 2018-10-02 NOTE — H&P
Hospital Medicine History and Physical    Date of Service  10/2/2018    Chief Complaint  Chief Complaint   Patient presents with   • Shortness of Breath     x1 week.  72% RA.  Congested.     • Sent from Urgent Care       History of Presenting Illness  63 y.o. female with a past medical history of HLD, Vitamin D Deficiency, Sciatica and Glaucoma presented 10/2/2018 initially presented to the Urgent care for flue life symptoms. She was unfortunately found to have a pulse-ox of 76%. As a result she was directed to the ER. In the ER patient has been complaining of of shortness of breath, with onset  1 week ago, progressively worsening, with feeling of congestion, productive cough, subjective chills, fatigue. She also has right elbow swelling which she was treated for bursitis by her physician. At this point patient will be admitted for evaluation of shortness of breath, with active treatment utilizing antibiotics, IV steroids and breathing treatments. Patient denies having chest pain.                Primary Care Physician  Demetri Britt M.D.    Consultants  None    Code Status  Code: Full code    Review of Systems  Review of Systems   Constitutional: Positive for malaise/fatigue. Negative for chills and fever.   HENT: Negative for congestion, hearing loss, sore throat and tinnitus.    Eyes: Negative for blurred vision, double vision, photophobia and pain.   Respiratory: Positive for cough, sputum production, shortness of breath and wheezing. Negative for hemoptysis and stridor.    Cardiovascular: Negative for chest pain, palpitations, orthopnea, claudication and PND.   Gastrointestinal: Negative for blood in stool, constipation, heartburn, melena, nausea and vomiting.   Genitourinary: Negative for dysuria, frequency and urgency.   Musculoskeletal: Negative for back pain, myalgias and neck pain.   Neurological: Positive for weakness. Negative for dizziness, tingling, tremors, sensory change, speech change  "and headaches.   Psychiatric/Behavioral: Negative for depression, memory loss and suicidal ideas. The patient is not nervous/anxious.           Past Medical History  Past Medical History:   Diagnosis Date   • Glaucoma 2010   • Colitis     e coli and salmonella   • Iritis    • Migraine    • Neck pain    • Retinopathy        Surgical History  Past Surgical History:   Procedure Laterality Date   • EYE SURGERY      multiple xiomara eye surgeries, lens implants   • OTHER      irridotomy both eyes & lense implants       Medications  No current facility-administered medications on file prior to encounter.      Current Outpatient Prescriptions on File Prior to Encounter   Medication Sig Dispense Refill   • cetirizine (ZYRTEC) 10 MG Tab Take 1 Tab by mouth every day. 90 Tab 1   • amoxicillin (AMOXIL) 500 MG Cap Take 1 Cap by mouth 3 times a day. 30 Cap 3   • timolol (BETIMOL) 0.5 % ophthalmic solution Place 1 Drop in both eyes 2 times a day. use in affect         Family History  Family History   Problem Relation Age of Onset   • Heart Disease Mother    • Heart Disease Father        Social History  Social History   Substance Use Topics   • Smoking status: Current Every Day Smoker     Packs/day: 0.50   • Smokeless tobacco: Never Used      Comment: 17 cigarrettes a day   • Alcohol use No      Comment: celebrations only--newyears       Allergies  Allergies   Allergen Reactions   • Iodine Shortness of Breath   • Latex      \"was told from her dentist, not sure reaction\"   Paper tape okay   • Maxepa Hives   • Niacin Shortness of Breath   • Shellfish Allergy         Physical Exam  Laboratory   Hemodynamics  Temp (24hrs), Av.5 °C (99.5 °F), Min:37.5 °C (99.5 °F), Max:37.5 °C (99.5 °F)   Temperature: 37.5 °C (99.5 °F)  Pulse  Av  Min: 80  Max: 122 Heart Rate (Monitored): 77  Blood Pressure : (!) 164/91, NIBP: 139/75      Respiratory      Respiration: 18, Pulse Oximetry: 96 %, O2 Daily Delivery Respiratory : Silicone Nasal " Cannula     Given By:: Mouthpiece  RUL Breath Sounds: Clear, RML Breath Sounds: Clear, RLL Breath Sounds: Diminished, GEN Breath Sounds: Clear, LLL Breath Sounds: Diminished    Physical Exam   Constitutional: She is oriented to person, place, and time. She appears well-developed and well-nourished. No distress.   HENT:   Head: Normocephalic and atraumatic.   Mouth/Throat: No oropharyngeal exudate.   Eyes: Pupils are equal, round, and reactive to light. Conjunctivae are normal. Right eye exhibits no discharge. No scleral icterus.   Neck: Neck supple. No JVD present. No thyromegaly present.   Cardiovascular: Normal rate and intact distal pulses.    No murmur heard.  Pulmonary/Chest: Effort normal. No stridor. No respiratory distress. She has wheezes (b/l scattered wheezing). She has no rales.   Poor air movement bilateral lung bases    Abdominal: Soft. Bowel sounds are normal. She exhibits no distension. There is no tenderness. There is no rebound.   Musculoskeletal: Normal range of motion. She exhibits no edema.   Neurological: She is alert and oriented to person, place, and time. No cranial nerve deficit.   Skin: Skin is warm. No rash noted. She is not diaphoretic. No erythema. No pallor.   Psychiatric: She has a normal mood and affect. Her behavior is normal. Thought content normal.         Assessment/Plan  * Acute on chronic respiratory failure with hypoxemia (HCC)- (present on admission)   Assessment & Plan    Multifactorial including component of COPD due to smoking, possible early pneumonia (clinical diagnosis with greenish productive cough, elevated leukocytosis) & Elevated BNP with unimpressive CXR.  IV Unasyn and Azithromycin started.  Blood and sputum cultures pending  Pro-calcitonin ordered and pending  IV steroids started, wean as tolerated  Echocardiogram ordered.   Continue RT protocol, duo nebs, Pep therapy if warranted, and incentive spirometry.           Acute exacerbation of chronic obstructive  pulmonary disease (COPD) ([facility])   Assessment & Plan    Smoker >15 cigarettes a day times 20 years  Audible wheezing on exam, hypoxia  IV steroids, RT protocol          Hyperlipidemia- (present on admission)   Assessment & Plan    Diet controlled        Retinopathy- (present on admission)   Assessment & Plan    Born with retinopathy left eye blind, stable        Glaucoma- (present on admission)   Assessment & Plan    Continue timolol eyedrops no acute issues            I anticipate this patient will require at least two midnights for appropriate medical management, necessitating inpatient admission.    Prophylaxis: lovenox    Recent Labs      10/02/18   0947   WBC  12.3*   RBC  5.20   HEMOGLOBIN  15.6   HEMATOCRIT  49.3*   MCV  94.8   MCH  30.0   MCHC  31.6*   RDW  45.1   PLATELETCT  263   MPV  10.1     Recent Labs      10/02/18   0947   SODIUM  136   POTASSIUM  4.1   CHLORIDE  97   CO2  28   GLUCOSE  100*   BUN  12   CREATININE  0.88   CALCIUM  8.8     Recent Labs      10/02/18   0947   ALTSGPT  34   ASTSGOT  27   ALKPHOSPHAT  95   TBILIRUBIN  1.2   GLUCOSE  100*         Recent Labs      10/02/18   0947   BNPBTYPENAT  525*         No results found for: TROPONINI    Imaging  DX-CHEST-PORTABLE (1 VIEW)   Final Result      No acute cardiopulmonary findings.

## 2018-10-02 NOTE — PROGRESS NOTES
Subjective:      Kyra Funez is a 63 y.o. female who presents with Abdominal Pain (C/o abd pain, feeling weak, urinary urgency, taking left over amoxicillin x4 days)    Past Medical History:   Diagnosis Date   • Colitis     e coli and salmonella   • Glaucoma 2/25/2010   • Iritis    • Migraine    • Neck pain    • Retinopathy      Social History     Social History   • Marital status: Single     Spouse name: N/A   • Number of children: N/A   • Years of education: N/A     Occupational History   • Not on file.     Social History Main Topics   • Smoking status: Current Every Day Smoker     Packs/day: 0.50   • Smokeless tobacco: Never Used      Comment: 17 cigarrettes a day   • Alcohol use No      Comment: celebrations only--newyears   • Drug use: No   • Sexual activity: Not on file     Other Topics Concern   • Not on file     Social History Narrative   • No narrative on file     Family History   Problem Relation Age of Onset   • Heart Disease Mother    • Heart Disease Father       allergies: Iodine; Maxepa; Niacin; and Shellfish allergy    Patient is a 63-year-old female who presents today with complaint of generalized weakness and shortness of breath. States that she just does not feel well. Denies any pain at this time. States that she thought she might be getting an infection in her sinuses so she started some leftover amoxicillin at home which has not helped. She denies chest pain. States that she does have leg cramping but no acute leg pain or swelling. She is a smoker, less than one pack per day.          Other   This is a new problem. The current episode started in the past 7 days. The problem has been gradually worsening. Associated symptoms include coughing and fatigue. Pertinent negatives include no abdominal pain, chills, fever, nausea or vomiting. Nothing aggravates the symptoms. Treatments tried: amoxil. The treatment provided no relief.       Review of Systems   Constitutional: Positive for fatigue and  "malaise/fatigue. Negative for chills and fever.   Respiratory: Positive for cough and shortness of breath.    Gastrointestinal: Negative for abdominal pain, nausea and vomiting.   Genitourinary: Negative for dysuria.   Skin: Negative.    All other systems reviewed and are negative.         Objective:     /84 (BP Location: Right arm, Patient Position: Sitting, BP Cuff Size: Large adult)   Pulse 97   Temp 37.1 °C (98.7 °F) (Temporal)   Resp 20   Ht 1.6 m (5' 3\")   Wt 86.6 kg (191 lb)   SpO2 (!) 80%   BMI 33.83 kg/m²      Physical Exam   Constitutional: She is oriented to person, place, and time. She appears well-developed and well-nourished.   HENT:   Head: Normocephalic.   Right Ear: External ear normal.   Left Ear: External ear normal.   Eyes: Pupils are equal, round, and reactive to light. EOM are normal.   Neck: Normal range of motion. Neck supple.   Cardiovascular: Regular rhythm and normal heart sounds.       Pulmonary/Chest:   Labored breathing   Musculoskeletal: Normal range of motion.   No obvious swelling in the legs, no calf pain, gaurav's sign negative    Neurological: She is alert and oriented to person, place, and time.   Skin: Skin is warm and dry. Capillary refill takes less than 2 seconds.   Psychiatric: She has a normal mood and affect. Her behavior is normal. Judgment and thought content normal.   Vitals reviewed.    I rechecked patient's oxygen saturation and found him to be 76-80% on room air. Heart rate is elevated and patient's breathing appears to be labored.    UA: negative leukocytes, negative nitrates, negative blood    EKG: Sinus rhythm, rate 93. No Acute ST elevation or depression noted.           Discussed with patient that I'm concerned for possibility of pulmonary embolus, pneumonia, or cardiac related symptoms. At this time I advised patient that she needs a higher level of care than what I can safely reasonable to provide. The urgent care. Patient verbalized " understanding and agreement with plan of care. Consulted Dr. Lindo at [facility] emergency room and he agreed to accept transfer the patient.          Assessment/Plan:     1. Weakness  2. Hypoxia  3. Tachycardia  [facility]   REMSA

## 2018-10-02 NOTE — PROGRESS NOTES
2 RN skin check performed with REGGIE Streeter. Patient noted to have a bruise to the left knee, and redness to the right elbow that is dressed in coban and gauze for previous case of bursitis. Otherwise skin surfaces are intact.

## 2018-10-02 NOTE — ED TRIAGE NOTES
Kyra Ashley Funez 63 y.o. female     Chief Complaint   Patient presents with   • Shortness of Breath     x1 week.  72% RA.  Congested.     • Sent from Urgent Care      Sepsis score 4.  Protocol initiated.  Chart placed for ERP eval.

## 2018-10-02 NOTE — FLOWSHEET NOTE
10/02/18 1029   Interdisciplinary Plan of Care-Goals (Indications)   Bronchodilator Indications Strong Subjective / Objective Improvement   Interdisciplinary Plan of Care-Outcomes    Bronchodilator Outcome Patient at Stable Baseline   Education   Education Yes - Pt. / Family has been Instructed in use of Respiratory Medications and Adverse Reactions   RT Assessment of Delivered Medications   Evaluation of Medication Delivery Daily Yes-- Pt /Family has been Instructed in use of Respiratory Medications and Adverse Reactions   SVN Group   #SVN Performed Yes   Given By: Mouthpiece   Respiratory WDL   Respiratory (WDL) X   Chest Exam   Respiration 13   Pulse 89   Heart Rate (Monitored) 86   Breath Sounds   Pre/Post Intervention Pre Intervention Assessment   RUL Breath Sounds Clear   RML Breath Sounds Clear   RLL Breath Sounds Diminished   GEN Breath Sounds Clear   LLL Breath Sounds Diminished   Secretions   Cough Moist;Non Productive   Oximetry   #Pulse Oximetry (Single Determination) Yes   Continuous Oximetry Yes   Oxygen   Pulse Oximetry 98 %   O2 (LPM) 4   O2 Daily Delivery Respiratory  Silicone Nasal Cannula

## 2018-10-02 NOTE — FLOWSHEET NOTE
10/02/18 1410   Events/Summary/Plan   Events/Summary/Plan Tx given   Non-Invasive Resp Device Site Inspection Completed Intact   Interdisciplinary Plan of Care-Goals (Indications)   Bronchodilator Indications History / Diagnosis   Interdisciplinary Plan of Care-Outcomes    Bronchodilator Outcome Diminished Wheezing and Volume of Air Movement Increased   Education   Education Yes - Pt. / Family has been Instructed in use of Respiratory Medications and Adverse Reactions   RT Assessment of Delivered Medications   Evaluation of Medication Delivery Daily Yes-- Pt /Family has been Instructed in use of Respiratory Medications and Adverse Reactions   SVN Group   #SVN Performed Yes   Given By: Mouthpiece   Date SVN Last Changed 10/02/18   Date SVN Next Change Due (Q 7 Days) 11/01/18   Chest Exam   Respiration 18   Heart Rate (Monitored) 75   Breath Sounds   Pre/Post Intervention Post Intervention Assessment   RUL Breath Sounds Diminished;Expiratory Wheezes   RML Breath Sounds Diminished;Expiratory Wheezes   RLL Breath Sounds Diminished   GEN Breath Sounds Diminished;Expiratory Wheezes   LLL Breath Sounds Diminished   Secretions   Cough Productive   How Sputum Obtained Expectorated   Sputum Amount Small   Sputum Color Clear;Yellow   Sputum Consistency Thin;Thick   Oximetry   #Pulse Oximetry (Single Determination) Yes   Oxygen   Home O2 Use Prior To Admission? No   Pulse Oximetry 95 %   O2 (LPM) 2   O2 Daily Delivery Respiratory  Silicone Nasal Cannula

## 2018-10-02 NOTE — ED PROVIDER NOTES
ED Provider Note    CHIEF COMPLAINT  Chief Complaint   Patient presents with   • Shortness of Breath     x1 week.  72% RA.  Congested.     • Sent from Urgent Care       HPI  Kyra Funez is a 63 y.o. female who presents with shortness of breath.  She has had a for about 1 week.  She states she has a history of allergies and sinus infections.  She has been taking allergy medication thought she had the flu felt fatigued.  She missed work for 2 days started herself on amoxicillin that she has had for sinus infection in the past and thinks that has improved it.  She went to urgent care for complaint of flu symptoms and was noted to have a pulse ox of 76% was sent here for evaluation.  She has no sore throat no cough she does have dyspnea on exertion no chest pain arm pain neck pain or back pain.  No sputum production some leg swelling.  She also complains of a swelling bursitis on her right elbow that is been treated by her back physician.  They put her on medications but did not open it.  She is also being treated for spinal stenosis by the back specialist.  No history of diabetes or heart disease.  She is a pack per day smoker no alcohol or drugs.  All other systems are negative    REVIEW OF SYSTEMS  See HPI for further details    PAST MEDICAL HISTORY  Past Medical History:   Diagnosis Date   • Colitis     e coli and salmonella   • Glaucoma 2/25/2010   • Iritis    • Migraine    • Neck pain    • Retinopathy        FAMILY HISTORY  Family History   Problem Relation Age of Onset   • Heart Disease Mother    • Heart Disease Father        SOCIAL HISTORY  Social History     Social History   • Marital status: Single     Spouse name: N/A   • Number of children: N/A   • Years of education: N/A     Social History Main Topics   • Smoking status: Current Every Day Smoker     Packs/day: 0.50   • Smokeless tobacco: Never Used      Comment: 17 cigarrettes a day   • Alcohol use No      Comment: celebrations only--newyears   • Drug  "use: No   • Sexual activity: Not on file     Other Topics Concern   • Not on file     Social History Narrative   • No narrative on file       SURGICAL HISTORY  Past Surgical History:   Procedure Laterality Date   • EYE SURGERY      multiple xiomara eye surgeries, lens implants   • OTHER      irridotomy both eyes & lense implants       CURRENT MEDICATIONS  Home Medications    **Home medications have not yet been reviewed for this encounter**         ALLERGIES  Allergies   Allergen Reactions   • Iodine Shortness of Breath   • Maxepa Hives   • Niacin Shortness of Breath   • Shellfish Allergy        PHYSICAL EXAM  VITAL SIGNS: BP (!) 164/91   Pulse (!) 122   Temp 37.5 °C (99.5 °F)   Resp 20   Ht 1.626 m (5' 4\")   Wt 87.1 kg (192 lb 0.3 oz)   SpO2 96%   BMI 32.96 kg/m²     Constitutional: Patient is alert and oriented x3 in mild distress   HENT: Slightly dry mucous membranes  Eyes:   No conjunctivitis or icterus  Neck: Trach is midline no palpable thyroid  Lymphatic: Negative anterior cervical lymphadenopathy  Cardiovascular: Normal heart rate tachycardic no murmur  Thorax & Lungs: Decreased breath sounds throughout left greater than right with wheezing  Back: No CVA tenderness  Abdomen: Soft nontender palpation  Neurologic: Normal motor sensation  Extremities: 1-2+ pretibial edema  Psychiatric: Affect normal, Judgment normal, Mood normal.     Results for orders placed or performed during the hospital encounter of 10/02/18   Lactic acid (lactate)   Result Value Ref Range    Lactic Acid 2.2 (H) 0.5 - 2.0 mmol/L   CBC WITH DIFFERENTIAL   Result Value Ref Range    WBC 12.3 (H) 4.8 - 10.8 K/uL    RBC 5.20 4.20 - 5.40 M/uL    Hemoglobin 15.6 12.0 - 16.0 g/dL    Hematocrit 49.3 (H) 37.0 - 47.0 %    MCV 94.8 81.4 - 97.8 fL    MCH 30.0 27.0 - 33.0 pg    MCHC 31.6 (L) 33.6 - 35.0 g/dL    RDW 45.1 35.9 - 50.0 fL    Platelet Count 263 164 - 446 K/uL    MPV 10.1 9.0 - 12.9 fL    Neutrophils-Polys 77.20 (H) 44.00 - 72.00 %    " Lymphocytes 12.90 (L) 22.00 - 41.00 %    Monocytes 8.40 0.00 - 13.40 %    Eosinophils 0.60 0.00 - 6.90 %    Basophils 0.50 0.00 - 1.80 %    Immature Granulocytes 0.40 0.00 - 0.90 %    Nucleated RBC 0.20 /100 WBC    Neutrophils (Absolute) 9.53 (H) 2.00 - 7.15 K/uL    Lymphs (Absolute) 1.59 1.00 - 4.80 K/uL    Monos (Absolute) 1.03 (H) 0.00 - 0.85 K/uL    Eos (Absolute) 0.07 0.00 - 0.51 K/uL    Baso (Absolute) 0.06 0.00 - 0.12 K/uL    Immature Granulocytes (abs) 0.05 0.00 - 0.11 K/uL    NRBC (Absolute) 0.02 K/uL   COMP METABOLIC PANEL   Result Value Ref Range    Sodium 136 135 - 145 mmol/L    Potassium 4.1 3.6 - 5.5 mmol/L    Chloride 97 96 - 112 mmol/L    Co2 28 20 - 33 mmol/L    Anion Gap 11.0 0.0 - 11.9    Glucose 100 (H) 65 - 99 mg/dL    Bun 12 8 - 22 mg/dL    Creatinine 0.88 0.50 - 1.40 mg/dL    Calcium 8.8 8.4 - 10.2 mg/dL    AST(SGOT) 27 12 - 45 U/L    ALT(SGPT) 34 2 - 50 U/L    Alkaline Phosphatase 95 30 - 99 U/L    Total Bilirubin 1.2 0.1 - 1.5 mg/dL    Albumin 3.4 3.2 - 4.9 g/dL    Total Protein 6.9 6.0 - 8.2 g/dL    Globulin 3.5 1.9 - 3.5 g/dL    A-G Ratio 1.0 g/dL   BTYPE NATRIURETIC PEPTIDE   Result Value Ref Range    B Natriuretic Peptide 525 (H) 0 - 100 pg/mL   INFLUENZA RAPID   Result Value Ref Range    Significant Indicator NEG     Source RESP     Site RESPIRATORY     Rapid Influenza A-B       Negative for Influenza A and Influenza B antigens.  Infection due to influenza A or B cannot be ruled out  since the antigen present in the specimen may be below the  detection limit of the test. Culture confirmation of  negative samples is recommended.     ESTIMATED GFR   Result Value Ref Range    GFR If African American >60 >60 mL/min/1.73 m 2    GFR If Non African American >60 >60 mL/min/1.73 m 2      DX-CHEST-PORTABLE (1 VIEW)   Final Result      No acute cardiopulmonary findings.          Procedure: The area of the right olecranon bursa was anesthetized with lidocaine with epinephrine and then prepped with  chlorhexidine.  It was subsequently aspirated with an 18-gauge needle and approximately 5 cc of thin watery bloody fluid was obtained that was sent for culture.      COURSE & MEDICAL DECISION MAKING  Pertinent Labs & Imaging studies reviewed. (See chart for details)    Patient has shortness of breath hypoxia wheezing decreased breath sounds.  She is likely suffering her initial COPD exacerbation she also by criteria has heart failure that could be etiologic as well.  I have given the patient albuterol treatment with the Pratropium.  She is also on oxygen.  Sepsis protocol is been initiated I am starting her on Unasyn is a azithromycin for possible pneumonia.    I contacted the hospitalist for admission.    FINAL IMPRESSION  1.   1. Acute exacerbation of chronic obstructive pulmonary disease (COPD) (HCC)    2. Congestive heart failure, unspecified HF chronicity, unspecified heart failure type (HCC)    3. Hypoxia    Olecranon bursitis    2.   3.         Electronically signed by: Jorge Lanier, 10/2/2018 9:56 AM

## 2018-10-02 NOTE — ASSESSMENT & PLAN NOTE
Wheezing and air movement starting to significantly improve hypoxia with exertion improving even better air movement, drops to mid 80s instead of 70s  Continue solumedrol high dose 125 mg every 6 hours, still not able to wean yet but getting closer  RT protocol  Smoking cessation discussed daily

## 2018-10-02 NOTE — ASSESSMENT & PLAN NOTE
COPD exacerbation, needs diagnostics after discharge, appreciate pulmonology input.  Smoking cessation

## 2018-10-03 ENCOUNTER — APPOINTMENT (OUTPATIENT)
Dept: CARDIOLOGY | Facility: MEDICAL CENTER | Age: 63
DRG: 189 | End: 2018-10-03
Attending: HOSPITALIST
Payer: COMMERCIAL

## 2018-10-03 LAB
ALBUMIN SERPL BCP-MCNC: 3.4 G/DL (ref 3.2–4.9)
ALBUMIN/GLOB SERPL: 1.1 G/DL
ALP SERPL-CCNC: 85 U/L (ref 30–99)
ALT SERPL-CCNC: 31 U/L (ref 2–50)
ANION GAP SERPL CALC-SCNC: 5 MMOL/L (ref 0–11.9)
AST SERPL-CCNC: 22 U/L (ref 12–45)
BASOPHILS # BLD AUTO: 0.2 % (ref 0–1.8)
BASOPHILS # BLD: 0.02 K/UL (ref 0–0.12)
BILIRUB SERPL-MCNC: 0.5 MG/DL (ref 0.1–1.5)
BUN SERPL-MCNC: 14 MG/DL (ref 8–22)
CALCIUM SERPL-MCNC: 8.4 MG/DL (ref 8.4–10.2)
CHLORIDE SERPL-SCNC: 100 MMOL/L (ref 96–112)
CO2 SERPL-SCNC: 33 MMOL/L (ref 20–33)
CREAT SERPL-MCNC: 0.85 MG/DL (ref 0.5–1.4)
EOSINOPHIL # BLD AUTO: 0 K/UL (ref 0–0.51)
EOSINOPHIL NFR BLD: 0 % (ref 0–6.9)
ERYTHROCYTE [DISTWIDTH] IN BLOOD BY AUTOMATED COUNT: 47.7 FL (ref 35.9–50)
GLOBULIN SER CALC-MCNC: 3.2 G/DL (ref 1.9–3.5)
GLUCOSE SERPL-MCNC: 151 MG/DL (ref 65–99)
HCT VFR BLD AUTO: 50.4 % (ref 37–47)
HGB BLD-MCNC: 15.6 G/DL (ref 12–16)
IMM GRANULOCYTES # BLD AUTO: 0.04 K/UL (ref 0–0.11)
IMM GRANULOCYTES NFR BLD AUTO: 0.4 % (ref 0–0.9)
LACTATE BLD-SCNC: 1.3 MMOL/L (ref 0.5–2)
LV EJECT FRACT  99904: 65
LYMPHOCYTES # BLD AUTO: 0.52 K/UL (ref 1–4.8)
LYMPHOCYTES NFR BLD: 5.7 % (ref 22–41)
MCH RBC QN AUTO: 30.4 PG (ref 27–33)
MCHC RBC AUTO-ENTMCNC: 31 G/DL (ref 33.6–35)
MCV RBC AUTO: 98.1 FL (ref 81.4–97.8)
MONOCYTES # BLD AUTO: 0.05 K/UL (ref 0–0.85)
MONOCYTES NFR BLD AUTO: 0.5 % (ref 0–13.4)
NEUTROPHILS # BLD AUTO: 8.51 K/UL (ref 2–7.15)
NEUTROPHILS NFR BLD: 93.2 % (ref 44–72)
NRBC # BLD AUTO: 0 K/UL
NRBC BLD-RTO: 0 /100 WBC
PLATELET # BLD AUTO: 250 K/UL (ref 164–446)
PMV BLD AUTO: 10.4 FL (ref 9–12.9)
POTASSIUM SERPL-SCNC: 5.2 MMOL/L (ref 3.6–5.5)
PROT SERPL-MCNC: 6.6 G/DL (ref 6–8.2)
RBC # BLD AUTO: 5.14 M/UL (ref 4.2–5.4)
SODIUM SERPL-SCNC: 138 MMOL/L (ref 135–145)
WBC # BLD AUTO: 9.1 K/UL (ref 4.8–10.8)

## 2018-10-03 PROCEDURE — 90662 IIV NO PRSV INCREASED AG IM: CPT | Performed by: HOSPITALIST

## 2018-10-03 PROCEDURE — 700101 HCHG RX REV CODE 250: Performed by: HOSPITALIST

## 2018-10-03 PROCEDURE — 700117 HCHG RX CONTRAST REV CODE 255: Performed by: HOSPITALIST

## 2018-10-03 PROCEDURE — 83605 ASSAY OF LACTIC ACID: CPT

## 2018-10-03 PROCEDURE — 99232 SBSQ HOSP IP/OBS MODERATE 35: CPT | Performed by: HOSPITALIST

## 2018-10-03 PROCEDURE — 700111 HCHG RX REV CODE 636 W/ 250 OVERRIDE (IP): Performed by: HOSPITALIST

## 2018-10-03 PROCEDURE — 94760 N-INVAS EAR/PLS OXIMETRY 1: CPT

## 2018-10-03 PROCEDURE — 94640 AIRWAY INHALATION TREATMENT: CPT

## 2018-10-03 PROCEDURE — 93306 TTE W/DOPPLER COMPLETE: CPT

## 2018-10-03 PROCEDURE — 700105 HCHG RX REV CODE 258: Performed by: HOSPITALIST

## 2018-10-03 PROCEDURE — 99406 BEHAV CHNG SMOKING 3-10 MIN: CPT

## 2018-10-03 PROCEDURE — 700102 HCHG RX REV CODE 250 W/ 637 OVERRIDE(OP): Performed by: HOSPITALIST

## 2018-10-03 PROCEDURE — 99253 IP/OBS CNSLTJ NEW/EST LOW 45: CPT | Performed by: INTERNAL MEDICINE

## 2018-10-03 PROCEDURE — 85025 COMPLETE CBC W/AUTO DIFF WBC: CPT

## 2018-10-03 PROCEDURE — 90471 IMMUNIZATION ADMIN: CPT

## 2018-10-03 PROCEDURE — 80053 COMPREHEN METABOLIC PANEL: CPT

## 2018-10-03 PROCEDURE — 3E02340 INTRODUCTION OF INFLUENZA VACCINE INTO MUSCLE, PERCUTANEOUS APPROACH: ICD-10-PCS | Performed by: HOSPITALIST

## 2018-10-03 PROCEDURE — A9270 NON-COVERED ITEM OR SERVICE: HCPCS | Performed by: HOSPITALIST

## 2018-10-03 PROCEDURE — 770020 HCHG ROOM/CARE - TELE (206)

## 2018-10-03 PROCEDURE — 93306 TTE W/DOPPLER COMPLETE: CPT | Mod: 26 | Performed by: INTERNAL MEDICINE

## 2018-10-03 RX ADMIN — SENNOSIDES AND DOCUSATE SODIUM 2 TABLET: 8.6; 5 TABLET ORAL at 17:43

## 2018-10-03 RX ADMIN — IPRATROPIUM BROMIDE AND ALBUTEROL SULFATE 3 ML: .5; 3 SOLUTION RESPIRATORY (INHALATION) at 06:52

## 2018-10-03 RX ADMIN — NICOTINE 21 MG: 21 PATCH, EXTENDED RELEASE TRANSDERMAL at 06:24

## 2018-10-03 RX ADMIN — IPRATROPIUM BROMIDE AND ALBUTEROL SULFATE 3 ML: .5; 3 SOLUTION RESPIRATORY (INHALATION) at 22:52

## 2018-10-03 RX ADMIN — IPRATROPIUM BROMIDE AND ALBUTEROL SULFATE 3 ML: .5; 3 SOLUTION RESPIRATORY (INHALATION) at 14:48

## 2018-10-03 RX ADMIN — SODIUM CHLORIDE 3 G: 900 INJECTION INTRAVENOUS at 01:09

## 2018-10-03 RX ADMIN — ENOXAPARIN SODIUM 40 MG: 100 INJECTION SUBCUTANEOUS at 06:11

## 2018-10-03 RX ADMIN — METHYLPREDNISOLONE SODIUM SUCCINATE 40 MG: 40 INJECTION, POWDER, FOR SOLUTION INTRAMUSCULAR; INTRAVENOUS at 06:18

## 2018-10-03 RX ADMIN — SODIUM CHLORIDE 3 G: 900 INJECTION INTRAVENOUS at 06:21

## 2018-10-03 RX ADMIN — AZITHROMYCIN MONOHYDRATE 250 MG: 250 TABLET ORAL at 06:17

## 2018-10-03 RX ADMIN — TIMOLOL MALEATE 1 DROP: 5 SOLUTION OPHTHALMIC at 17:53

## 2018-10-03 RX ADMIN — IPRATROPIUM BROMIDE AND ALBUTEROL SULFATE 3 ML: .5; 3 SOLUTION RESPIRATORY (INHALATION) at 11:23

## 2018-10-03 RX ADMIN — HUMAN ALBUMIN MICROSPHERES AND PERFLUTREN 3 ML: 10; .22 INJECTION, SOLUTION INTRAVENOUS at 08:15

## 2018-10-03 RX ADMIN — CETIRIZINE HYDROCHLORIDE 10 MG: 10 TABLET, FILM COATED ORAL at 06:22

## 2018-10-03 RX ADMIN — METHYLPREDNISOLONE SODIUM SUCCINATE 40 MG: 40 INJECTION, POWDER, FOR SOLUTION INTRAMUSCULAR; INTRAVENOUS at 17:42

## 2018-10-03 RX ADMIN — INFLUENZA A VIRUS A/MICHIGAN/45/2015 X-275 (H1N1) ANTIGEN (FORMALDEHYDE INACTIVATED), INFLUENZA A VIRUS A/SINGAPORE/INFIMH-16-0019/2016 IVR-186 (H3N2) ANTIGEN (FORMALDEHYDE INACTIVATED), AND INFLUENZA B VIRUS B/MARYLAND/15/2016 BX-69A (A B/COLORADO/6/2017-LIKE VIRUS) ANTIGEN (FORMALDEHYDE INACTIVATED) 0.5 ML: 60; 60; 60 INJECTION, SUSPENSION INTRAMUSCULAR at 21:04

## 2018-10-03 RX ADMIN — METHYLPREDNISOLONE SODIUM SUCCINATE 40 MG: 40 INJECTION, POWDER, FOR SOLUTION INTRAMUSCULAR; INTRAVENOUS at 13:01

## 2018-10-03 RX ADMIN — IPRATROPIUM BROMIDE AND ALBUTEROL SULFATE 3 ML: .5; 3 SOLUTION RESPIRATORY (INHALATION) at 18:49

## 2018-10-03 RX ADMIN — SODIUM CHLORIDE 3 G: 900 INJECTION INTRAVENOUS at 13:02

## 2018-10-03 RX ADMIN — TIMOLOL MALEATE 1 DROP: 5 SOLUTION OPHTHALMIC at 06:23

## 2018-10-03 ASSESSMENT — ENCOUNTER SYMPTOMS
DEPRESSION: 0
MYALGIAS: 0
SINUS PAIN: 1
PSYCHIATRIC NEGATIVE: 1
NAUSEA: 0
SHORTNESS OF BREATH: 1
WEAKNESS: 0
COUGH: 1
NERVOUS/ANXIOUS: 0
CONSTITUTIONAL NEGATIVE: 1
ABDOMINAL PAIN: 0
BRUISES/BLEEDS EASILY: 0
MUSCULOSKELETAL NEGATIVE: 1
WHEEZING: 1
VOMITING: 0
GASTROINTESTINAL NEGATIVE: 1
CHILLS: 0
BACK PAIN: 1
DIZZINESS: 0
CARDIOVASCULAR NEGATIVE: 1
NEUROLOGICAL NEGATIVE: 1
WEIGHT LOSS: 0
BACK PAIN: 0
SPUTUM PRODUCTION: 1
FEVER: 0
FLANK PAIN: 0
LOSS OF CONSCIOUSNESS: 0

## 2018-10-03 ASSESSMENT — PAIN SCALES - GENERAL
PAINLEVEL_OUTOF10: 0

## 2018-10-03 ASSESSMENT — PATIENT HEALTH QUESTIONNAIRE - PHQ9
2. FEELING DOWN, DEPRESSED, IRRITABLE, OR HOPELESS: NEARLY EVERY DAY
SUM OF ALL RESPONSES TO PHQ9 QUESTIONS 1 AND 2: 6
1. LITTLE INTEREST OR PLEASURE IN DOING THINGS: NEARLY EVERY DAY

## 2018-10-03 NOTE — FLOWSHEET NOTE
Patient requested to hold tx. No distress at this time     10/03/18 0420   Events/Summary/Plan   Events/Summary/Plan Pt refused tx   Breath Sounds   Pre/Post Intervention Pre Intervention Assessment   RUL Breath Sounds Diminished   RML Breath Sounds Diminished   RLL Breath Sounds Diminished   GEN Breath Sounds Diminished   LLL Breath Sounds Diminished   Oximetry   #Pulse Oximetry (Single Determination) Yes   Oxygen   Pulse Oximetry 95 %   O2 (LPM) 3   O2 Daily Delivery Respiratory  Silicone Nasal Cannula

## 2018-10-03 NOTE — ASSESSMENT & PLAN NOTE
Very Slowly improving but still cannot get patient to understand re: COPD and hypoxemia and that this has been going on for years  Walked her through breathing exercises but she explains she is a shallow breather   Educated her on COPD and emphysema and explained needing inhalers and follow up -- patient feels the providers helping her with her back could manage her breathing  Spent over 20 minutes trying to review the above with patient without success and do think that she has not accepted the diagnosis    Discussed with Dr. BALA Black  She will need a very slow taper of steroids as still quite tight   I will start her with symbicort bid

## 2018-10-03 NOTE — PROGRESS NOTES
Pt resting in bed, LS continue very diminished bilat, exp wheeze on left. Continues to have difficulty accepting diagnosis of COPD, repeatedly states she just has allergies and needs tx for sinus infection. O2 91% on 3L currently, drops to low 80's with exertion. Initially complained of chronic left leg pain to CNA, upon assessment denies pain to RN, states she was just trying to explain pain she has had in the past. RT continues with sched nebs, IV steroids given as ordered. Frequent reorientation to call bell continues, bed alarm on for safety. Frequent education provided throughout shift. Will continue to monitor.

## 2018-10-03 NOTE — RESPIRATORY CARE
"COPD Education by COPD Clinical Educator  10/3/2018 at 9:45 AM by Keily Elliott    Patient interviewed by COPD education team.  Patient refused full COPD program at this time, but agreed to short intervention.  A comprehensive packet including information about COPD, treatments, and smoking cessation given.    Smoking Cessation Intervention 3 -10 minutes completed.  Provided smoking cessation packet with \"Tips to Quit\" and flyer for \"Free Smoking Cessation Classes\".  "

## 2018-10-03 NOTE — FLOWSHEET NOTE
10/03/18 1452   Events/Summary/Plan   Events/Summary/Plan SVN given   Interdisciplinary Plan of Care-Goals (Indications)   Bronchodilator Indications Physical Exam / Hyperinflation / Wheezing (bronchospasm)   Interdisciplinary Plan of Care-Outcomes    Bronchodilator Outcome Improved Vital Signs and Measures of Gas Exchange;Patient at Stable Baseline   SVN Group   #SVN Performed Yes   Given By: Mouthpiece   Date SVN Next Change Due (Q 7 Days) 10/09/18   Chest Exam   Work Of Breathing / Effort Mild   Respiration 18  (post 18)   Pulse 93  (post 94)   Heart Rate (Monitored) 93   Breath Sounds   Pre/Post Intervention Pre Intervention Assessment  (increased aeration following svn)   RUL Breath Sounds Diminished   RML Breath Sounds Diminished   RLL Breath Sounds Diminished   GEN Breath Sounds Diminished   LLL Breath Sounds Diminished   Oximetry   #Pulse Oximetry (Single Determination) Yes   Oxygen   Pulse Oximetry 93 %   O2 (LPM) 3   O2 Daily Delivery Respiratory  Silicone Nasal Cannula

## 2018-10-03 NOTE — PROGRESS NOTES
Bedside report with Alondra PAREDES. Pt resting comfortably in bed with out complaint. Call bell within reach and visitor at bedside.

## 2018-10-03 NOTE — FLOWSHEET NOTE
Nebulizer given via mask as patient is very sleepy.  Breath sounds very diminished with mild wheeze.     10/02/18 1508   Interdisciplinary Plan of Care-Goals (Indications)   Bronchodilator Indications Physical Exam / Hyperinflation / Wheezing (bronchospasm)   RT Assessment of Delivered Medications   Evaluation of Medication Delivery Daily Yes-- Pt /Family has been Instructed in use of Respiratory Medications and Adverse Reactions   SVN Group   #SVN Performed Yes   Given By: Mask   Chest Exam   Work Of Breathing / Effort Shallow;Mild   Respiration (!) 26   Pulse 78   Breath Sounds   RUL Breath Sounds Expiratory Wheezes;Diminished   RML Breath Sounds Expiratory Wheezes;Diminished   RLL Breath Sounds Diminished   GEN Breath Sounds Expiratory Wheezes;Diminished   LLL Breath Sounds Diminished   Secretions   Cough Congested;Non Productive   Oximetry   #Pulse Oximetry (Single Determination) Yes   Oxygen   Home O2 Use Prior To Admission? No   Pulse Oximetry 94 %   O2 (LPM) 3.5   O2 Daily Delivery Respiratory  Silicone Nasal Cannula

## 2018-10-03 NOTE — PROGRESS NOTES
Report received from Megan PAREDES. Patient resting comfortably at this time, Echo tech in to do bedside Echo with contrast, tech aware of pt allergies. IV flushed and patent prior to exam.

## 2018-10-03 NOTE — CARE PLAN
Problem: Communication  Goal: The ability to communicate needs accurately and effectively will improve    Intervention: Reorient patient to environment as needed  A&Ox4  Intervention: Educate patient and significant other/support system about the plan of care, procedures, treatments, medications and allow for questions  Family and pt educated on POC

## 2018-10-03 NOTE — PROGRESS NOTES
Lact acid=2.5  Spoke with Dr López about PRN normal saline bolus. Dr. López instructed to give the PRN bolus, see MAR and continue to cycle lactic acids Q4h.

## 2018-10-03 NOTE — CARE PLAN
Problem: Communication  Goal: The ability to communicate needs accurately and effectively will improve  Outcome: PROGRESSING SLOWER THAN EXPECTED  Patient requiring frequent reorientation and reeducation on need to press call bell for help. Multiple tubes-IV/O2, unsteady ambulation due to chronic leg pain per pt.     Problem: Respiratory:  Goal: Respiratory status will improve  Outcome: PROGRESSING SLOWER THAN EXPECTED  Patient continues to average 88-90% on 3-5liters O2. Frequent education given by multiple staff regarding COPD status, pt is resistant to education and feels it is her allergies and sinuses. RT continues sched nebs with pt, IV steroids given per MD order. Pt O2 sat drops quickly upon exertion, lengthy time to recover and compensate.     Problem: Mobility  Goal: Risk for activity intolerance will decrease  Outcome: PROGRESSING SLOWER THAN EXPECTED  Patient continues to have significant decrease in O2 with any exertion. Complains of chronic nerve pain in left leg/lower back when sitting or ambulating to BR.

## 2018-10-03 NOTE — CONSULTS
CONSULT NOTE  Pulmonary & Critical Care Medicine     Date of service: 10/3/2018    Requesting physician: Dr. Marley Black  Reason for consult new diagnosis of COPD    Assessment & Plan   Acute exacerbation of chronic obstructive pulmonary disease (COPD) ([facility])   Assessment & Plan    Patient does not seem to be accepting of her diagnosis of COPD and feels all her symptoms are due to allergies, sinusitis and not taking antibiotics early enough  She is aware smoking is not good for her but she feels that she has cut down on her smoking so it should not affect her  Based on her clubbing and central cyanosis, her COPD symptoms must have been present for a while which at this time patient denies  She feels her main problems are allergies only. She is mainly focused on antihistamines and feels her PCP will be able to help her  I did try to explain to patient regarding her diagnosis and why she is hypoxic but did not succeed  I am not sure she is agreeable to inhalers for COPD as I brought it up several times    I will try again tomorrow to see if she will be more open to discussing COPD further.  She does have polycythemia suggesting again that she has been hypoxic for a while.  I was surprised to see that her right ventricular systolic pressures were normal.  Currently on 3 L nasal cannula saturating at 90%  Agree with current management of her COPD with methylprednisolone and azithromycin as well as the duo nebs              HPI    Kyra Funez is an 63 y.o. female admitted 10/3/2018 with COPD exacerbation and found to be acute hypoxic respiratory failure with saturation of 76%.    Patient from [facility].  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  Exposed to secondhand smoke extensively.  She also smokes down to half pack per day but would not quite quantify how many packs per day she is smoked.  Moved to [place] in 2005.  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  Regarding exercise intolerance  "she says she is unable to walk due to back pain.  She denies any wheezing other than this time and may be 4 months ago once but otherwise denies any problems with her breathing.  This is all her problems with cough are related to post nasal drainage.  Says she takes antibiotics every few months and just has not taken antibiotics within the past year and that so she thinks she is in so much trouble.  She had bronchitis in 2005 with otherwise no other lung problems per patient.    History quite vague as she focuses on the allergies mainly as well as problems with her glaucoma      Review of Systems   Constitutional: Positive for malaise/fatigue.   HENT: Positive for congestion and hearing loss.         Blind in left eye   Respiratory: Positive for cough, sputum production and wheezing.    Cardiovascular: Negative.    Gastrointestinal: Negative.    Genitourinary: Negative.    Musculoskeletal: Positive for back pain.   Skin: Negative.    Neurological: Negative.    Endo/Heme/Allergies: Negative.    Psychiatric/Behavioral: Negative.            Past Medical History:   Diagnosis Date   • Glaucoma 2/25/2010   • Colitis     e coli and salmonella   • Iritis    • Migraine    • Neck pain    • Retinopathy      Allergies   Allergen Reactions   • Iodine Shortness of Breath   • Latex      \"was told from her dentist, not sure reaction\"   Paper tape okay   • Maxepa Hives   • Niacin Shortness of Breath   • Shellfish Allergy    • Tomato      Past Surgical History:   Procedure Laterality Date   • EYE SURGERY      multiple xiomara eye surgeries, lens implants   • OTHER      irridotomy both eyes & lense implants     No current facility-administered medications on file prior to encounter.      Current Outpatient Prescriptions on File Prior to Encounter   Medication Sig Dispense Refill   • cetirizine (ZYRTEC) 10 MG Tab Take 1 Tab by mouth every day. 90 Tab 1   • amoxicillin (AMOXIL) 500 MG Cap Take 1 Cap by mouth 3 times a day. 30 Cap 3   • " timolol (BETIMOL) 0.5 % ophthalmic solution Place 1 Drop in both eyes 2 times a day. use in affect         Current Facility-Administered Medications:   •  cetirizine (ZYRTEC) tablet 10 mg, 10 mg, Oral, DAILY, Berhane Santiago M.D., 10 mg at 10/03/18 0622  •  senna-docusate (PERICOLACE or SENOKOT S) 8.6-50 MG per tablet 2 Tab, 2 Tab, Oral, BID **AND** polyethylene glycol/lytes (MIRALAX) PACKET 1 Packet, 1 Packet, Oral, QDAY PRN **AND** magnesium hydroxide (MILK OF MAGNESIA) suspension 30 mL, 30 mL, Oral, QDAY PRN **AND** bisacodyl (DULCOLAX) suppository 10 mg, 10 mg, Rectal, QDAY PRN, Berhane Santiago M.D.  •  Respiratory Care per Protocol, , Nebulization, Continuous RT, Berhane Santiago M.D.  •  enoxaparin (LOVENOX) inj 40 mg, 40 mg, Subcutaneous, DAILY, Berhane Santiago M.D., 40 mg at 10/03/18 0611  •  azithromycin (ZITHROMAX) tablet 250 mg, 250 mg, Oral, Q24HRS, Berhane Santiago M.D., 250 mg at 10/03/18 0617  •  methylPREDNISolone (SOLU-MEDROL) 40 MG injection 40 mg, 40 mg, Intravenous, Q6HRS, Berhane Santiago M.D., 40 mg at 10/03/18 1301  •  nicotine (NICODERM) 21 MG/24HR 21 mg, 21 mg, Transdermal, Daily-0600, 21 mg at 10/03/18 0624 **AND** Nicotine Replacement Patient Education Materials, , , Once **AND** nicotine polacrilex (NICORETTE) 2 MG piece 2 mg, 2 mg, Oral, Q HOUR PRN, Berhane Santiago M.D.  •  Influenza Vaccine High-Dose pf injection 0.5 mL, 0.5 mL, Intramuscular, Once PRN, Berhane Santiago M.D.  •  pneumococcal 13-Luzmaria Conj Vacc (PREVNAR 13) syringe 0.5 mL, 0.5 mL, Intramuscular, Once PRN, Berhane Santiago M.D.  •  ipratropium-albuterol (DUONEB) nebulizer solution, 3 mL, Nebulization, Q4HRS (RT), Berhane Santiago M.D., 3 mL at 10/03/18 1448  •  ipratropium-albuterol (DUONEB) nebulizer solution, 3 mL, Nebulization, Q4H PRN (RT), Berhane Santiago M.D.  •  timolol (TIMOPTIC) 0.5 % ophthalmic solution 1 Drop, 1 Drop, Both Eyes, BID, Berhane Santiago M.D., 1 Drop at 10/03/18  "0623    Social History     Social History   • Marital status: Single     Spouse name: N/A   • Number of children: N/A   • Years of education: N/A     Occupational History   • Not on file.     Social History Main Topics   • Smoking status: Current Every Day Smoker     Packs/day: 0.50   • Smokeless tobacco: Never Used      Comment: 17 cigarrettes a day   • Alcohol use No      Comment: celebrations only--newyears   • Drug use: No   • Sexual activity: Not on file     Other Topics Concern   • Not on file     Social History Narrative   • No narrative on file       Objective     PHYSICAL EXAM:   /72   Pulse 96   Temp 36.6 °C (97.9 °F)   Resp 20   Ht 1.626 m (5' 4\")   Wt 88 kg (194 lb 0.1 oz)   SpO2 90%   Breastfeeding? No   BMI 33.30 kg/m²     Intake/Output Summary (Last 24 hours) at 10/03/18 1021  Last data filed at 10/03/18 0900   Gross per 24 hour   Intake              590 ml   Output                0 ml   Net              590 ml       Gen: no apparent distress, resting comfortably  HEENT: Normocephalic atraumatic, left pupil dilated she is blind in the side right pupil normal-appearing.  Oropharynx clear no erythema or exudates; Nares patents, no discharge from the eyes and no discharge from either ear.  Positive central cyanosis  Trachea midline, no adenopathy  Cardiovascular: Regular rate and rhythm, no murmurs or rubs  Pulmonary: Markedly diminished air entry bilaterally with wheezes throughout  Abdomen: Soft, nondistended, nontender, normoactive bowel sounds; no organomegaly  Gu: no nuno catheter present  Extremities: No lower extremity edema bilaterally, peripheral pulses +2 in all 4 extremities, no calf tenderness.  Positive clubbing  Neuro: Alert and oriented ×3, moving all extremities equally, no focal deficits   Skin: No rash      Labs:  Lab Results   Component Value Date/Time    SODIUM 138 10/03/2018 01:12 AM    POTASSIUM 5.2 10/03/2018 01:12 AM    CHLORIDE 100 10/03/2018 01:12 AM    CO2 33 " 10/03/2018 01:12 AM    GLUCOSE 151 (H) 10/03/2018 01:12 AM    BUN 14 10/03/2018 01:12 AM    CREATININE 0.85 10/03/2018 01:12 AM    CREATININE 1.03 (H) 03/05/2010 10:02 AM    BUNCREATRAT 18 06/03/2016 07:50 AM    BUNCREATRAT 17 03/05/2010 10:02 AM    GLOMRATE 55 (L) 03/05/2010 10:02 AM        Lab Results   Component Value Date/Time    WBC 9.1 10/03/2018 01:11 AM    WBC 7.8 03/05/2010 10:02 AM    RBC 5.14 10/03/2018 01:11 AM    RBC 4.95 03/05/2010 10:02 AM    HEMOGLOBIN 15.6 10/03/2018 01:11 AM    HEMATOCRIT 50.4 (H) 10/03/2018 01:11 AM    MCV 98.1 (H) 10/03/2018 01:11 AM    MCV 90 03/05/2010 10:02 AM    MCH 30.4 10/03/2018 01:11 AM    MCH 29.9 03/05/2010 10:02 AM    MCHC 31.0 (L) 10/03/2018 01:11 AM    MPV 10.4 10/03/2018 01:11 AM    NEUTSPOLYS 93.20 (H) 10/03/2018 01:11 AM    LYMPHOCYTES 5.70 (L) 10/03/2018 01:11 AM    MONOCYTES 0.50 10/03/2018 01:11 AM    EOSINOPHILS 0.00 10/03/2018 01:11 AM    BASOPHILS 0.20 10/03/2018 01:11 AM      Blood cultures negative x2, influenza negative  Chest x-ray 10/2/2018 personally viewed which does not show any infiltrates or effusions.  Marked prominence of interstitial markings bilaterally.  Erinn Velázquez

## 2018-10-03 NOTE — FLOWSHEET NOTE
10/03/18 1125   Events/Summary/Plan   Events/Summary/Plan breathing tx   Interdisciplinary Plan of Care-Goals (Indications)   Bronchodilator Indications Physical Exam / Hyperinflation / Wheezing (bronchospasm)   Interdisciplinary Plan of Care-Outcomes    Bronchodilator Outcome Diminished Wheezing and Volume of Air Movement Increased   Education   Education Yes - Pt. / Family has been Instructed in use of Respiratory Equipment;Yes - Pt. / Family has been Instructed in use of Respiratory Medications and Adverse Reactions   RT Assessment of Delivered Medications   Evaluation of Medication Delivery Daily Yes-- Pt /Family has been Instructed in use of Respiratory Medications and Adverse Reactions   SVN Group   #SVN Performed Yes   Given By: Mouthpiece   Date SVN Last Changed 10/02/18   Date SVN Next Change Due (Q 7 Days) 10/09/18   Respiratory WDL   Respiratory (WDL) X   Chest Exam   Work Of Breathing / Effort Mild   Respiration 17   Pulse 97   Breath Sounds   Pre/Post Intervention Pre Intervention Assessment   RUL Breath Sounds Diminished   RML Breath Sounds Diminished   RLL Breath Sounds Diminished   GEN Breath Sounds Diminished   LLL Breath Sounds Diminished   Secretions   Cough Non Productive   Oximetry   #Pulse Oximetry (Single Determination) Yes   Continuous Oximetry Yes   Oxygen   Home O2 Use Prior To Admission? No   Pulse Oximetry 93 %   O2 (LPM) 3   O2 Daily Delivery Respiratory  Silicone Nasal Cannula

## 2018-10-03 NOTE — FLOWSHEET NOTE
10/02/18 1930   Interdisciplinary Plan of Care-Goals (Indications)   Bronchodilator Indications Physical Exam / Hyperinflation / Wheezing (bronchospasm)   Interdisciplinary Plan of Care-Outcomes    Bronchodilator Outcome Diminished Wheezing and Volume of Air Movement Increased;Patient at Stable Baseline   RT Assessment of Delivered Medications   Evaluation of Medication Delivery Daily Yes-- Pt /Family has been Instructed in use of Respiratory Medications and Adverse Reactions   SVN Group   #SVN Performed Yes   Given By: Mouthpiece   Chest Exam   Work Of Breathing / Effort Mild   Respiration (!) 24   Pulse 83   Breath Sounds   Pre/Post Intervention (slight increase in wheeze after treatment)   RUL Breath Sounds Diminished;Expiratory Wheezes   RML Breath Sounds Diminished;Expiratory Wheezes   RLL Breath Sounds Diminished   GEN Breath Sounds Diminished;Expiratory Wheezes   LLL Breath Sounds Diminished   Oximetry   #Pulse Oximetry (Single Determination) Yes   Oxygen   Home O2 Use Prior To Admission? No   Pulse Oximetry 90 %   O2 (LPM) 3   O2 Daily Delivery Respiratory  Silicone Nasal Cannula

## 2018-10-04 LAB — PROCALCITONIN SERPL-MCNC: <0.05 NG/ML

## 2018-10-04 PROCEDURE — 700101 HCHG RX REV CODE 250: Performed by: HOSPITALIST

## 2018-10-04 PROCEDURE — 770020 HCHG ROOM/CARE - TELE (206)

## 2018-10-04 PROCEDURE — 94667 MNPJ CHEST WALL 1ST: CPT

## 2018-10-04 PROCEDURE — 94640 AIRWAY INHALATION TREATMENT: CPT

## 2018-10-04 PROCEDURE — 94668 MNPJ CHEST WALL SBSQ: CPT

## 2018-10-04 PROCEDURE — 99406 BEHAV CHNG SMOKING 3-10 MIN: CPT

## 2018-10-04 PROCEDURE — 700102 HCHG RX REV CODE 250 W/ 637 OVERRIDE(OP): Performed by: HOSPITALIST

## 2018-10-04 PROCEDURE — 94760 N-INVAS EAR/PLS OXIMETRY 1: CPT

## 2018-10-04 PROCEDURE — 700111 HCHG RX REV CODE 636 W/ 250 OVERRIDE (IP): Performed by: HOSPITALIST

## 2018-10-04 PROCEDURE — 99232 SBSQ HOSP IP/OBS MODERATE 35: CPT | Performed by: HOSPITALIST

## 2018-10-04 PROCEDURE — A9270 NON-COVERED ITEM OR SERVICE: HCPCS | Performed by: HOSPITALIST

## 2018-10-04 PROCEDURE — 84145 PROCALCITONIN (PCT): CPT

## 2018-10-04 RX ORDER — METHYLPREDNISOLONE SODIUM SUCCINATE 125 MG/2ML
125 INJECTION, POWDER, LYOPHILIZED, FOR SOLUTION INTRAMUSCULAR; INTRAVENOUS EVERY 6 HOURS
Status: COMPLETED | OUTPATIENT
Start: 2018-10-05 | End: 2018-10-05

## 2018-10-04 RX ADMIN — IPRATROPIUM BROMIDE AND ALBUTEROL SULFATE 3 ML: .5; 3 SOLUTION RESPIRATORY (INHALATION) at 03:09

## 2018-10-04 RX ADMIN — METHYLPREDNISOLONE SODIUM SUCCINATE 40 MG: 40 INJECTION, POWDER, FOR SOLUTION INTRAMUSCULAR; INTRAVENOUS at 00:07

## 2018-10-04 RX ADMIN — AZITHROMYCIN MONOHYDRATE 250 MG: 250 TABLET ORAL at 05:40

## 2018-10-04 RX ADMIN — METHYLPREDNISOLONE SODIUM SUCCINATE 40 MG: 40 INJECTION, POWDER, FOR SOLUTION INTRAMUSCULAR; INTRAVENOUS at 05:41

## 2018-10-04 RX ADMIN — ENOXAPARIN SODIUM 40 MG: 100 INJECTION SUBCUTANEOUS at 05:43

## 2018-10-04 RX ADMIN — IPRATROPIUM BROMIDE AND ALBUTEROL SULFATE 3 ML: .5; 3 SOLUTION RESPIRATORY (INHALATION) at 23:23

## 2018-10-04 RX ADMIN — IPRATROPIUM BROMIDE AND ALBUTEROL SULFATE 3 ML: .5; 3 SOLUTION RESPIRATORY (INHALATION) at 10:42

## 2018-10-04 RX ADMIN — SENNOSIDES AND DOCUSATE SODIUM 2 TABLET: 8.6; 5 TABLET ORAL at 18:53

## 2018-10-04 RX ADMIN — TIMOLOL MALEATE 1 DROP: 5 SOLUTION OPHTHALMIC at 05:44

## 2018-10-04 RX ADMIN — NICOTINE 21 MG: 21 PATCH, EXTENDED RELEASE TRANSDERMAL at 05:45

## 2018-10-04 RX ADMIN — IPRATROPIUM BROMIDE AND ALBUTEROL SULFATE 3 ML: .5; 3 SOLUTION RESPIRATORY (INHALATION) at 15:29

## 2018-10-04 RX ADMIN — CETIRIZINE HYDROCHLORIDE 10 MG: 10 TABLET, FILM COATED ORAL at 05:40

## 2018-10-04 RX ADMIN — METHYLPREDNISOLONE SODIUM SUCCINATE 125 MG: 125 INJECTION, POWDER, FOR SOLUTION INTRAMUSCULAR; INTRAVENOUS at 23:32

## 2018-10-04 RX ADMIN — TIMOLOL MALEATE 1 DROP: 5 SOLUTION OPHTHALMIC at 18:53

## 2018-10-04 RX ADMIN — SENNOSIDES AND DOCUSATE SODIUM 2 TABLET: 8.6; 5 TABLET ORAL at 05:40

## 2018-10-04 RX ADMIN — IPRATROPIUM BROMIDE AND ALBUTEROL SULFATE 3 ML: .5; 3 SOLUTION RESPIRATORY (INHALATION) at 19:12

## 2018-10-04 RX ADMIN — METHYLPREDNISOLONE SODIUM SUCCINATE 40 MG: 40 INJECTION, POWDER, FOR SOLUTION INTRAMUSCULAR; INTRAVENOUS at 11:33

## 2018-10-04 RX ADMIN — IPRATROPIUM BROMIDE AND ALBUTEROL SULFATE 3 ML: .5; 3 SOLUTION RESPIRATORY (INHALATION) at 07:24

## 2018-10-04 ASSESSMENT — PATIENT HEALTH QUESTIONNAIRE - PHQ9
SUM OF ALL RESPONSES TO PHQ9 QUESTIONS 1 AND 2: 6
1. LITTLE INTEREST OR PLEASURE IN DOING THINGS: NEARLY EVERY DAY
2. FEELING DOWN, DEPRESSED, IRRITABLE, OR HOPELESS: NEARLY EVERY DAY

## 2018-10-04 ASSESSMENT — ENCOUNTER SYMPTOMS
DIZZINESS: 0
FEVER: 0
WHEEZING: 1
COUGH: 0
NERVOUS/ANXIOUS: 0
NAUSEA: 0
NEUROLOGICAL NEGATIVE: 1
CONSTITUTIONAL NEGATIVE: 1
CHILLS: 0
CARDIOVASCULAR NEGATIVE: 1
SINUS PAIN: 1
BRUISES/BLEEDS EASILY: 0
GASTROINTESTINAL NEGATIVE: 1
DEPRESSION: 0
SHORTNESS OF BREATH: 1
PSYCHIATRIC NEGATIVE: 1
MYALGIAS: 0
VOMITING: 0
FLANK PAIN: 0
MUSCULOSKELETAL NEGATIVE: 1
WEAKNESS: 0
LOSS OF CONSCIOUSNESS: 0
WEIGHT LOSS: 0
ABDOMINAL PAIN: 0
BACK PAIN: 0

## 2018-10-04 ASSESSMENT — PAIN SCALES - GENERAL
PAINLEVEL_OUTOF10: 0

## 2018-10-04 NOTE — FLOWSHEET NOTE
10/03/18 2252   Events/Summary/Plan   Events/Summary/Plan SVN with MP upright in bed with strong spontaneous cough   Interdisciplinary Plan of Care-Goals (Indications)   Bronchodilator Indications History / Diagnosis;Physical Exam / Hyperinflation / Wheezing (bronchospasm)   Interdisciplinary Plan of Care-Outcomes    Bronchodilator Outcome Diminished Wheezing and Volume of Air Movement Increased;Improved Vital Signs and Measures of Gas Exchange   Education   Education Yes - Pt. / Family has been Instructed in use of Respiratory Medications and Adverse Reactions   RT Assessment of Delivered Medications   Evaluation of Medication Delivery Daily Yes-- Pt /Family has been Instructed in use of Respiratory Medications and Adverse Reactions   SVN Group   #SVN Performed Yes   Given By: Mouthpiece   Date SVN Last Changed 10/02/18   Date SVN Next Change Due (Q 7 Days) 10/09/18   Chest Exam   Work Of Breathing / Effort Moderate   Respiration 20   Pulse (!) 102   Breath Sounds   RUL Breath Sounds Diminished;Expiratory Wheezes   RML Breath Sounds Diminished;Expiratory Wheezes   RLL Breath Sounds Diminished   GEN Breath Sounds Diminished;Expiratory Wheezes   LLL Breath Sounds Diminished   Secretions   Cough Moist;Strong   How Sputum Obtained Spontaneous   Oximetry   #Pulse Oximetry (Single Determination) Yes   Continuous Oximetry Yes   Oxygen   Home O2 Use Prior To Admission? No   Pulse Oximetry 92 %   O2 (LPM) 3.5   O2 Daily Delivery Respiratory  Silicone Nasal Cannula

## 2018-10-04 NOTE — PROGRESS NOTES
Pt up to BR at 1030, attempted to wash up at the sink. O2 sat noted to decrease to 71% on 4L, pt symptomatic with pursed lip breathing and cyanotic mouth. Assisted to sit in bed, O2 increased to 5L, pt now resting comfortably with O2 of 93% at 5L. Accepted visitors and ate lunch tray, tolerated well. IV meds given as ordered. Will continue to monitor.

## 2018-10-04 NOTE — PROGRESS NOTES
Report received from Az PAREDES, pt resting comfortably with eyes closed, O2 in place at 3.5 liters via N/C.

## 2018-10-04 NOTE — PROGRESS NOTES
Patient up to commode with standby assist, O2 sat 91% on 5L, dropped to 83% with activity. Returned to bed, O2 recovered to 91% after approx 5 minutes. Currently sitting up in bed, O2 in place, awaiting her evening visitors.

## 2018-10-04 NOTE — FLOWSHEET NOTE
Patient now on 5 L nasal cannula (she had desaturation while walking to bathroom with nursing with O2 on).  Flutter started as patient's cough is congested and she does poorly with IS.     10/04/18 1042   Interdisciplinary Plan of Care-Goals (Indications)   Bronchopulmonary Hygiene Indications Difficulty with Secretion Clearance   RT Assessment of Delivered Medications   Evaluation of Medication Delivery Daily Yes-- Pt /Family has been Instructed in use of Respiratory Medications and Adverse Reactions   SVN Group   #SVN Performed Yes   Given By: Mouthpiece   PEP/CPT Group   PEP/CPT/Airway Clearance Therapy Yes   #PEP/CPT (Manual) Initial Initial   PEP/CPT Method Flutter Valve   Incentive Spirometry Group   Incentive Spirometer Volume 500 mL   Chest Exam   Work Of Breathing / Effort Mild   Respiration 20   Pulse (!) 110   Breath Sounds   RUL Breath Sounds Expiratory Wheezes;Diminished   RML Breath Sounds Expiratory Wheezes;Diminished   RLL Breath Sounds Expiratory Wheezes;Diminished   GEN Breath Sounds Expiratory Wheezes;Diminished   LLL Breath Sounds Expiratory Wheezes;Diminished   Secretions   Cough Congested;Moist;Non Productive   Sputum Amount (moderate sinus secretions)   Oximetry   Continuous Oximetry Yes   Oxygen   Home O2 Use Prior To Admission? No   Pulse Oximetry 90 %   O2 (LPM) 5   O2 Daily Delivery Respiratory  Silicone Nasal Cannula

## 2018-10-04 NOTE — FLOWSHEET NOTE
10/04/18 0309   Events/Summary/Plan   Events/Summary/Plan SVN with MP: awakened for tx, domingo in semi fowlers   Interdisciplinary Plan of Care-Goals (Indications)   Bronchodilator Indications Physical Exam / Hyperinflation / Wheezing (bronchospasm)   Interdisciplinary Plan of Care-Outcomes    Bronchodilator Outcome Diminished Wheezing and Volume of Air Movement Increased   Education   Education Yes - Pt. / Family has been Instructed in use of Respiratory Medications and Adverse Reactions   RT Assessment of Delivered Medications   Evaluation of Medication Delivery Daily Yes-- Pt /Family has been Instructed in use of Respiratory Medications and Adverse Reactions   SVN Group   #SVN Performed Yes   Given By: Mouthpiece   Date SVN Last Changed 10/02/18   Date SVN Next Change Due (Q 7 Days) 10/09/18   Chest Exam   Work Of Breathing / Effort Mild   Respiration 17   Pulse 92   Breath Sounds   Pre/Post Intervention Pre Intervention Assessment   RUL Breath Sounds Diminished   RML Breath Sounds Diminished   RLL Breath Sounds Diminished   GEN Breath Sounds Diminished   LLL Breath Sounds Diminished   Secretions   Cough Moist;Strong   How Sputum Obtained Spontaneous   Oximetry   #Pulse Oximetry (Single Determination) Yes   Oxygen   Home O2 Use Prior To Admission? No   Pulse Oximetry 93 %   O2 (LPM) 3.5   O2 Daily Delivery Respiratory  Silicone Nasal Cannula

## 2018-10-04 NOTE — PROGRESS NOTES
Alert and oriented  to person,place and time.No edema, on 3L of O2 sat 93%. Denied pain at this time.Medication given per MAR. States understanding of POC.safety precaution in place, call light at reach.

## 2018-10-04 NOTE — PROGRESS NOTES
Bedside report given to  Scarlet PAREDES.Pt  Resting in the bed.Safety precautions in place and all the lines remain patent.

## 2018-10-04 NOTE — PROGRESS NOTES
Tele strip at 0657 shows SR w/ HR of 96  Measurements: .14/.08/.34    Tele Shift Summary:  Rhythm: SR  Rate: 80's-90's  Ectopy: Per CCT Alanna/Aida, pt had no ectopy.    Telemetry monitoring strips placed in pt chart.

## 2018-10-04 NOTE — FLOWSHEET NOTE
10/03/18 1849   Events/Summary/Plan   Events/Summary/Plan SVN with MP   Interdisciplinary Plan of Care-Goals (Indications)   Bronchodilator Indications Physical Exam / Hyperinflation / Wheezing (bronchospasm)   Interdisciplinary Plan of Care-Outcomes    Bronchodilator Outcome Improved Vital Signs and Measures of Gas Exchange   Education   Education Yes - Pt. / Family has been Instructed in use of Respiratory Medications and Adverse Reactions   RT Assessment of Delivered Medications   Evaluation of Medication Delivery Daily Yes-- Pt /Family has been Instructed in use of Respiratory Medications and Adverse Reactions   SVN Group   #SVN Performed Yes   Given By: Mouthpiece   Date SVN Last Changed 10/02/18   Date SVN Next Change Due (Q 7 Days) 10/09/18   Chest Exam   Work Of Breathing / Effort Mild   Respiration 20   Pulse 96   Breath Sounds   RUL Breath Sounds Diminished;Expiratory Wheezes   RML Breath Sounds Diminished   RLL Breath Sounds Diminished   GEN Breath Sounds Diminished;Expiratory Wheezes   LLL Breath Sounds Diminished   Secretions   Cough Moist;Non Productive   How Sputum Obtained Spontaneous   Oximetry   #Pulse Oximetry (Single Determination) Yes   Continuous Oximetry Yes   Oxygen   Home O2 Use Prior To Admission? No   Pulse Oximetry 93 %   O2 (LPM) 3   O2 Daily Delivery Respiratory  Silicone Nasal Cannula

## 2018-10-04 NOTE — FLOWSHEET NOTE
10/04/18 0724   Interdisciplinary Plan of Care-Goals (Indications)   Bronchodilator Indications Physical Exam / Hyperinflation / Wheezing (bronchospasm);History / Diagnosis   RT Assessment of Delivered Medications   Evaluation of Medication Delivery Daily Yes-- Pt /Family has been Instructed in use of Respiratory Medications and Adverse Reactions   SVN Group   #SVN Performed Yes   Given By: Mouthpiece   Incentive Spirometry Group   Incentive Spirometry Instruction Yes   Incentive Spirometer Volume 500 mL   Chest Exam   Work Of Breathing / Effort Mild   Respiration (!) 22   Pulse 80   Breath Sounds   Pre/Post Intervention (wheeze is mild, increased after treatment)   RUL Breath Sounds Diminished;Expiratory Wheezes   RML Breath Sounds Diminished;Expiratory Wheezes   RLL Breath Sounds Diminished   GEN Breath Sounds Diminished;Expiratory Wheezes   LLL Breath Sounds Diminished   Oxygen   Home O2 Use Prior To Admission? No   Pulse Oximetry 95 %   O2 (LPM) 3.5  (decreased to 3 L)   O2 Daily Delivery Respiratory  Silicone Nasal Cannula

## 2018-10-04 NOTE — PROGRESS NOTES
Report given to Az PAREDES. Patient resting in bed, O2 in place, R/T at bedside with duoneb. Bed alarm on for safety.

## 2018-10-04 NOTE — PROGRESS NOTES
[facility] Medicine Daily Progress Note    Date of Service  10/3/2018    Chief Complaint  63 y.o. female admitted 10/2/2018 with shortness of breath and cough.    Hospital Course    63 y.o. female with a past medical history of HLD, Vitamin D Deficiency, Sciatica and Glaucoma presented 10/2/2018 initially presented to the Urgent care for flue life symptoms. She was unfortunately found to have a pulse-ox of 76%. As a result she was directed to the ER. In the ER patient has been complaining of of shortness of breath, with onset  1 week ago, progressively worsening, with feeling of congestion, productive cough, subjective chills, fatigue. She also has right elbow swelling which she was treated for bursitis by her physician. At this point patient will be admitted for evaluation of shortness of breath, with active treatment utilizing antibiotics, IV steroids and breathing treatments. Patient denies having chest pain.      Interval Problem Update  She continues to be very short of breath, spo2 drops to 80 with ambulating to the restroom. At rest she is comfortable and can speak in full sentences. Patient is very fixated on her sinus issues and basically refuses to acknowledge that she has COPD and won't commit to smoking cessation.    Consultants/Specialty  Pulmonary, DR. Schaeffer.    Code Status  Full code.    Disposition  TBD.    Review of Systems  Review of Systems   Constitutional: Negative.  Negative for chills, fever, malaise/fatigue and weight loss.   HENT: Positive for congestion and sinus pain. Negative for ear pain.    Respiratory: Positive for cough, shortness of breath and wheezing.    Cardiovascular: Negative.  Negative for chest pain and leg swelling.   Gastrointestinal: Negative.  Negative for abdominal pain, nausea and vomiting.   Genitourinary: Negative.  Negative for dysuria and flank pain.   Musculoskeletal: Negative.  Negative for back pain and myalgias.   Neurological: Negative.  Negative for dizziness,  loss of consciousness and weakness.   Endo/Heme/Allergies: Negative.  Does not bruise/bleed easily.   Psychiatric/Behavioral: Negative.  Negative for depression. The patient is not nervous/anxious.    All other systems reviewed and are negative.       Physical Exam  Temp:  [36.3 °C (97.3 °F)-36.9 °C (98.5 °F)] 36.7 °C (98.1 °F)  Pulse:  [78-98] 98  Resp:  [17-26] 18  BP: (107-130)/(63-78) 107/63    Physical Exam   Constitutional: She is oriented to person, place, and time. She appears well-developed and well-nourished. No distress.   Patient seen and examined by me.   HENT:   Nose: Nose normal.   Mouth/Throat: Oropharynx is clear and moist. Mucous membranes are cyanotic. No oropharyngeal exudate.   Eyes: Conjunctivae are normal. Right eye exhibits no discharge. Left eye exhibits no discharge. No scleral icterus.   Neck: No JVD present. No tracheal deviation present.   Cardiovascular: Normal rate, regular rhythm and normal heart sounds.    Pulmonary/Chest: Effort normal. No stridor. No respiratory distress. She has wheezes. She has no rales. She exhibits no tenderness.   Very poor air movement.   Abdominal: Soft. Bowel sounds are normal. She exhibits no distension. There is no tenderness.   Musculoskeletal: She exhibits no edema or tenderness.   Neurological: She is alert and oriented to person, place, and time. No cranial nerve deficit. She exhibits normal muscle tone.   Skin: Skin is warm and dry. She is not diaphoretic. There is cyanosis. No pallor. Nails show clubbing.   Psychiatric: Her behavior is normal. Her mood appears anxious.   Nursing note and vitals reviewed.      Fluids    Intake/Output Summary (Last 24 hours) at 10/03/18 1831  Last data filed at 10/03/18 1530   Gross per 24 hour   Intake              830 ml   Output              600 ml   Net              230 ml       Laboratory  Recent Labs      10/02/18   0947  10/03/18   0111   WBC  12.3*  9.1   RBC  5.20  5.14   HEMOGLOBIN  15.6  15.6   HEMATOCRIT   49.3*  50.4*   MCV  94.8  98.1*   MCH  30.0  30.4   MCHC  31.6*  31.0*   RDW  45.1  47.7   PLATELETCT  263  250   MPV  10.1  10.4     Recent Labs      10/02/18   0947  10/03/18   0112   SODIUM  136  138   POTASSIUM  4.1  5.2   CHLORIDE  97  100   CO2  28  33   GLUCOSE  100*  151*   BUN  12  14   CREATININE  0.88  0.85   CALCIUM  8.8  8.4         Recent Labs      10/02/18   0947   BNPBTYPENAT  525*           Imaging  EC-ECHOCARDIOGRAM COMPLETE W/ CONT   Final Result      DX-CHEST-PORTABLE (1 VIEW)   Final Result      No acute cardiopulmonary findings.           Assessment/Plan  * Acute on chronic respiratory failure with hypoxemia (HCC)- (present on admission)   Assessment & Plan    Appears to be due to COPD exacerbation.  Supplemental oxygen, RT protocol.  Smoking cessation discussed again.  Treat with steroids and antibiotics.          Acute exacerbation of chronic obstructive pulmonary disease (COPD) (HCC)- (present on admission)   Assessment & Plan    Pulmonology consult, needs outpatient care and PFTs.  Smoking cessation discussed more than 5 minutes, nicotine replacement offered.  RT protocol  IV solumedrol 40mg IV every 6 hours  Patient has advanced disease including polycythemia, clubbing of the nails and perioral cyanosis.  Continue azithromycin.          Recurrent sinusitis- (present on admission)   Assessment & Plan    The source of greatest complaint for the patient is her sinus issues.  Recommend antibiotics for now, zyrtec, ENT referral as an outpatient.        Hyperlipidemia- (present on admission)   Assessment & Plan    Diet controlled        Retinopathy- (present on admission)   Assessment & Plan    Born with retinopathy left eye blind, stable        Glaucoma- (present on admission)   Assessment & Plan    Continue timolol eyedrops no acute issues             VTE prophylaxis: lovenox

## 2018-10-04 NOTE — PROGRESS NOTES
Telemetry Report: pt has been SR/ST.  HR: 70s-120  Measurements: (.12/.08/.35)  Ectopy: r PAC, r PVC.    Measurements and updates per Aristides THOMPSON

## 2018-10-04 NOTE — CARE PLAN
Problem: Oxygenation:  Goal: Maintain adequate oxygenation dependent on patient condition  Outcome: PROGRESSING AS EXPECTED  Patient on nasal cannula @ 3.5 LPM for Sp02 > 90%  Intervention: Manage oxygen therapy by monitoring pulse oximetry and/or ABG values  Prn and Q4 hours with duoneb treatments  Intervention: Levels of oxygenation will improve to baseline  Patient did not use supplemental oxygen at home      Problem: Bronchoconstriction:  Goal: Improve in air movement and diminished wheezing  Outcome: PROGRESSING AS EXPECTED  Patient feels benefit of treatments and aeration improved following duonebs  Intervention: Implement inhaled treatments  Patient's RT protocol placed her in category for SVN treatments Q4 hours and prn  Intervention: Evaluate and manage medication effects  Patient assessed before and after therapies

## 2018-10-04 NOTE — DISCHARGE PLANNING
Anticipated Discharge Disposition: TBD    Action: LSW  Met with pt at bedside to provide resources. Pt states she lives alone and has poor eye site and a spine issue. She is not able to clean her home or drive. She was working for the [employer], but right now cannot due to her health. Pt ired a house keeper recently to clean her home. Pt's  Brother is a big support to her and helps her grocery shop. LSW provided pt with an attendant care list and also the RSVP program for Senior  and ride assistance due to pt mentioning she is out of the service area for RTC Access..      Barriers to Discharge: None    Plan: LSW to monitor for d/c needs

## 2018-10-04 NOTE — FLOWSHEET NOTE
Smoking Cessation Intervention completed.  Patient had several questions about smoking cessation, nicotine replacement and e-cigarettes.  Questions answered with patient being more involved this time in smoking cessation discussion.     10/04/18 1529   RT Assessment of Delivered Medications   Evaluation of Medication Delivery Daily Yes-- Pt /Family has been Instructed in use of Respiratory Medications and Adverse Reactions   SVN Group   #SVN Performed Yes   Given By: Mouthpiece   PEP/CPT Group   PEP/CPT/Airway Clearance Therapy Yes   #PEP/CPT (Manual) Subsequent Subsequent   PEP/CPT Method Flutter Valve   Chest Exam   Work Of Breathing / Effort Mild   Respiration (!) 22   Pulse (!) 105   Breath Sounds   RUL Breath Sounds Expiratory Wheezes;Diminished   RML Breath Sounds Expiratory Wheezes;Diminished   RLL Breath Sounds Expiratory Wheezes;Diminished   GEN Breath Sounds Expiratory Wheezes;Diminished   LLL Breath Sounds Expiratory Wheezes;Diminished   Secretions   Cough Congested;Moist   Oxygen   Home O2 Use Prior To Admission? No   Pulse Oximetry 91 %   O2 (LPM) 5   O2 Daily Delivery Respiratory  Silicone Nasal Cannula   Events   #(RT ONLY) Smoking and tobacco use cessation counseling 3-10 minutes smart text complete?  Symptomatic

## 2018-10-05 PROBLEM — D75.1 POLYCYTHEMIA: Status: ACTIVE | Noted: 2018-10-05

## 2018-10-05 PROBLEM — L25.9 CONTACT DERMATITIS: Status: ACTIVE | Noted: 2018-10-05

## 2018-10-05 LAB
BACTERIA WND AEROBE CULT: NORMAL
SIGNIFICANT IND 70042: NORMAL
SITE SITE: NORMAL
SOURCE SOURCE: NORMAL

## 2018-10-05 PROCEDURE — A9270 NON-COVERED ITEM OR SERVICE: HCPCS | Performed by: HOSPITALIST

## 2018-10-05 PROCEDURE — 700102 HCHG RX REV CODE 250 W/ 637 OVERRIDE(OP): Performed by: HOSPITALIST

## 2018-10-05 PROCEDURE — 770020 HCHG ROOM/CARE - TELE (206)

## 2018-10-05 PROCEDURE — 700111 HCHG RX REV CODE 636 W/ 250 OVERRIDE (IP): Performed by: HOSPITALIST

## 2018-10-05 PROCEDURE — 99232 SBSQ HOSP IP/OBS MODERATE 35: CPT | Performed by: INTERNAL MEDICINE

## 2018-10-05 PROCEDURE — 94760 N-INVAS EAR/PLS OXIMETRY 1: CPT

## 2018-10-05 PROCEDURE — 94668 MNPJ CHEST WALL SBSQ: CPT

## 2018-10-05 PROCEDURE — 700105 HCHG RX REV CODE 258: Performed by: HOSPITALIST

## 2018-10-05 PROCEDURE — 99232 SBSQ HOSP IP/OBS MODERATE 35: CPT | Performed by: HOSPITALIST

## 2018-10-05 PROCEDURE — 94640 AIRWAY INHALATION TREATMENT: CPT

## 2018-10-05 PROCEDURE — 700101 HCHG RX REV CODE 250: Performed by: HOSPITALIST

## 2018-10-05 RX ORDER — SODIUM CHLORIDE 9 MG/ML
INJECTION, SOLUTION INTRAVENOUS CONTINUOUS
Status: DISCONTINUED | OUTPATIENT
Start: 2018-10-05 | End: 2018-10-09

## 2018-10-05 RX ORDER — BUDESONIDE AND FORMOTEROL FUMARATE DIHYDRATE 80; 4.5 UG/1; UG/1
2 AEROSOL RESPIRATORY (INHALATION)
Status: DISCONTINUED | OUTPATIENT
Start: 2018-10-05 | End: 2018-10-05

## 2018-10-05 RX ORDER — BUDESONIDE AND FORMOTEROL FUMARATE DIHYDRATE 80; 4.5 UG/1; UG/1
2 AEROSOL RESPIRATORY (INHALATION)
Status: DISCONTINUED | OUTPATIENT
Start: 2018-10-05 | End: 2018-10-09 | Stop reason: HOSPADM

## 2018-10-05 RX ADMIN — METHYLPREDNISOLONE SODIUM SUCCINATE 125 MG: 125 INJECTION, POWDER, FOR SOLUTION INTRAMUSCULAR; INTRAVENOUS at 05:36

## 2018-10-05 RX ADMIN — IPRATROPIUM BROMIDE AND ALBUTEROL SULFATE 3 ML: .5; 3 SOLUTION RESPIRATORY (INHALATION) at 07:17

## 2018-10-05 RX ADMIN — SENNOSIDES AND DOCUSATE SODIUM 2 TABLET: 8.6; 5 TABLET ORAL at 18:40

## 2018-10-05 RX ADMIN — IPRATROPIUM BROMIDE AND ALBUTEROL SULFATE 3 ML: .5; 3 SOLUTION RESPIRATORY (INHALATION) at 10:52

## 2018-10-05 RX ADMIN — SENNOSIDES AND DOCUSATE SODIUM 2 TABLET: 8.6; 5 TABLET ORAL at 05:36

## 2018-10-05 RX ADMIN — IPRATROPIUM BROMIDE AND ALBUTEROL SULFATE 3 ML: .5; 3 SOLUTION RESPIRATORY (INHALATION) at 02:35

## 2018-10-05 RX ADMIN — SODIUM CHLORIDE: 9 INJECTION, SOLUTION INTRAVENOUS at 13:52

## 2018-10-05 RX ADMIN — CETIRIZINE HYDROCHLORIDE 10 MG: 10 TABLET, FILM COATED ORAL at 05:36

## 2018-10-05 RX ADMIN — NICOTINE 21 MG: 21 PATCH, EXTENDED RELEASE TRANSDERMAL at 05:35

## 2018-10-05 RX ADMIN — AZITHROMYCIN MONOHYDRATE 250 MG: 250 TABLET ORAL at 05:36

## 2018-10-05 RX ADMIN — IPRATROPIUM BROMIDE AND ALBUTEROL SULFATE 3 ML: .5; 3 SOLUTION RESPIRATORY (INHALATION) at 14:48

## 2018-10-05 RX ADMIN — TIMOLOL MALEATE 1 DROP: 5 SOLUTION OPHTHALMIC at 05:42

## 2018-10-05 RX ADMIN — IPRATROPIUM BROMIDE AND ALBUTEROL SULFATE 3 ML: .5; 3 SOLUTION RESPIRATORY (INHALATION) at 18:22

## 2018-10-05 RX ADMIN — IPRATROPIUM BROMIDE AND ALBUTEROL SULFATE 3 ML: .5; 3 SOLUTION RESPIRATORY (INHALATION) at 22:25

## 2018-10-05 RX ADMIN — TIMOLOL MALEATE 1 DROP: 5 SOLUTION OPHTHALMIC at 18:40

## 2018-10-05 RX ADMIN — BUDESONIDE AND FORMOTEROL FUMARATE DIHYDRATE 2 PUFF: 80; 4.5 AEROSOL RESPIRATORY (INHALATION) at 18:22

## 2018-10-05 ASSESSMENT — ENCOUNTER SYMPTOMS
VOMITING: 0
LOSS OF CONSCIOUSNESS: 0
WHEEZING: 1
MYALGIAS: 0
WEIGHT LOSS: 0
NAUSEA: 0
HEADACHES: 0
NERVOUS/ANXIOUS: 0
MUSCULOSKELETAL NEGATIVE: 1
WEAKNESS: 0
FLANK PAIN: 0
BRUISES/BLEEDS EASILY: 0
SINUS PAIN: 1
PSYCHIATRIC NEGATIVE: 1
CARDIOVASCULAR NEGATIVE: 1
BACK PAIN: 0
FEVER: 0
ABDOMINAL PAIN: 0
NEUROLOGICAL NEGATIVE: 1
CHILLS: 0
DIZZINESS: 0
DEPRESSION: 0
COUGH: 1
SHORTNESS OF BREATH: 1
GASTROINTESTINAL NEGATIVE: 1

## 2018-10-05 ASSESSMENT — PAIN SCALES - GENERAL
PAINLEVEL_OUTOF10: 0

## 2018-10-05 ASSESSMENT — PATIENT HEALTH QUESTIONNAIRE - PHQ9
SUM OF ALL RESPONSES TO PHQ9 QUESTIONS 1 AND 2: 0
1. LITTLE INTEREST OR PLEASURE IN DOING THINGS: NEARLY EVERY DAY
SUM OF ALL RESPONSES TO PHQ9 QUESTIONS 1 AND 2: 6
2. FEELING DOWN, DEPRESSED, IRRITABLE, OR HOPELESS: NEARLY EVERY DAY
1. LITTLE INTEREST OR PLEASURE IN DOING THINGS: NOT AT ALL
2. FEELING DOWN, DEPRESSED, IRRITABLE, OR HOPELESS: NOT AT ALL

## 2018-10-05 NOTE — PROGRESS NOTES
Patient resting comfortably after several hour visit with brother. O2 in place at 3.5L via N/C, maintaining 91-93%. Pulmonologist at bedside speaking with patient re: COPD status.

## 2018-10-05 NOTE — CARE PLAN
Problem: Safety  Goal: Will remain free from falls  Outcome: PROGRESSING SLOWER THAN EXPECTED  Pt does not always use call bell before getting up, forgetful at times, tries to get up on own to commode. Pt is on 5L O2 and decompensates quickly.     Problem: Respiratory:  Goal: Respiratory status will improve  Outcome: PROGRESSING SLOWER THAN EXPECTED  Pt is on 5L O2 via N/C. O2 sat quickly drops to 70's-80's with any activity. Continuing on scheduled nebs with R/T and continuous O2 titration.

## 2018-10-05 NOTE — PROGRESS NOTES
"PROGRESS NOTE  Pulmonary & Critical Care Medicine     Date of service: 10/5/2018    Assessment & Plan   Acute exacerbation of chronic obstructive pulmonary disease (COPD) (HCC)   Assessment & Plan    Very Slowly improving but still cannot get patient to understand re: COPD and hypoxemia and that this has been going on for years  Walked her through breathing exercises but she explains she is a shallow breather   Educated her on COPD and emphysema and explained needing inhalers and follow up -- patient feels the providers helping her with her back could manage her breathing  Spent over 20 minutes trying to review the above with patient without success and do think that she has not accepted the diagnosis    Discussed with Dr. BALA Black  She will need a very slow taper of steroids as still quite tight   I will start her with symbicort bid             Interval History  Kyra Funez is an 63 y.o. female admitted 10/3/2018 with COPD exacerbation and found to be acute hypoxic respiratory failure with saturation of 76%.  She has not been accepting of her diagnosis  Slow improvement  Any ambulation she desaturates    ROS   c/o back pain  C/o of coughing up green sputum   Rest of the review of systems is negative    Objective     PHYSICAL EXAM:   /75   Pulse (!) 113 Comment: Rn notified  Temp 36.9 °C (98.5 °F)   Resp (!) 22   Ht 1.626 m (5' 4\")   Wt 90.6 kg (199 lb 11.8 oz)   SpO2 90%   Breastfeeding? No   BMI 34.28 kg/m²     Intake/Output Summary (Last 24 hours) at 10/05/18 1620  Last data filed at 10/05/18 1300   Gross per 24 hour   Intake              680 ml   Output              300 ml   Net              380 ml     Gen: no apparent distress, resting comfortably  HEENT: Normocephalic atraumatic, left pupil dilated she is blind in the side right pupil normal-appearing.  Oropharynx clear no erythema or exudates; Nares patents, no discharge from the eyes and no discharge from either ear.  Positive central " cyanosis  Trachea midline, no adenopathy  Cardiovascular: Regular rate and rhythm, no murmurs or rubs  Pulmonary: Markedly diminished air entry bilaterally with wheezes throughout  Abdomen: Soft, nondistended, nontender, normoactive bowel sounds; no organomegaly  Gu: no nuno catheter present  Extremities: No lower extremity edema bilaterally, peripheral pulses +2 in all 4 extremities, no calf tenderness.  Positive clubbing  Neuro: Alert and oriented ×3, moving all extremities equally, no focal deficits   Skin: No rash       Labs:  Lab Results   Component Value Date/Time    SODIUM 138 10/03/2018 01:12 AM    POTASSIUM 5.2 10/03/2018 01:12 AM    CHLORIDE 100 10/03/2018 01:12 AM    CO2 33 10/03/2018 01:12 AM    GLUCOSE 151 (H) 10/03/2018 01:12 AM    BUN 14 10/03/2018 01:12 AM    CREATININE 0.85 10/03/2018 01:12 AM    CREATININE 1.03 (H) 03/05/2010 10:02 AM    BUNCREATRAT 18 06/03/2016 07:50 AM    BUNCREATRAT 17 03/05/2010 10:02 AM    GLOMRATE 55 (L) 03/05/2010 10:02 AM        Lab Results   Component Value Date/Time    WBC 9.1 10/03/2018 01:11 AM    WBC 7.8 03/05/2010 10:02 AM    RBC 5.14 10/03/2018 01:11 AM    RBC 4.95 03/05/2010 10:02 AM    HEMOGLOBIN 15.6 10/03/2018 01:11 AM    HEMATOCRIT 50.4 (H) 10/03/2018 01:11 AM    MCV 98.1 (H) 10/03/2018 01:11 AM    MCV 90 03/05/2010 10:02 AM    MCH 30.4 10/03/2018 01:11 AM    MCH 29.9 03/05/2010 10:02 AM    MCHC 31.0 (L) 10/03/2018 01:11 AM    MPV 10.4 10/03/2018 01:11 AM    NEUTSPOLYS 93.20 (H) 10/03/2018 01:11 AM    LYMPHOCYTES 5.70 (L) 10/03/2018 01:11 AM    MONOCYTES 0.50 10/03/2018 01:11 AM    EOSINOPHILS 0.00 10/03/2018 01:11 AM    BASOPHILS 0.20 10/03/2018 01:11 AM         Erinn Velázquez

## 2018-10-05 NOTE — PROGRESS NOTES
Resting on and off. Respirations even and unlabored. Easily aroused to sound or touch. No c/o. Due medications given without issue. Call light in reach.

## 2018-10-05 NOTE — PROGRESS NOTES
Assessment completed. Pt alert and oriented x 4, denies pain, O2 93% on 3.5 liters via N/C. Noted to have fine red rash on back, BUE, BLE, chest. Pt reports itchiness, denies increase in SOB R/T rash. Assisted pt to bedside commode, noted to become SOB and desat to 80%, able to recover in approx 5-10 mins. All linens and clothing changed to hypoallergenic products, verified no latex in room, alert note placed in chart. Call light and belongings within reach, rings appropriately.

## 2018-10-05 NOTE — FLOWSHEET NOTE
10/04/18 1912   Events/Summary/Plan   Events/Summary/Plan SVN with flutter, spontaneous NPC   Interdisciplinary Plan of Care-Goals (Indications)   Bronchodilator Indications Physical Exam / Hyperinflation / Wheezing (bronchospasm)   Interdisciplinary Plan of Care-Outcomes    Bronchodilator Outcome Diminished Wheezing and Volume of Air Movement Increased   Education   Education Yes - Pt. / Family has been Instructed in use of Respiratory Medications and Adverse Reactions   RT Assessment of Delivered Medications   Evaluation of Medication Delivery Daily Yes-- Pt /Family has been Instructed in use of Respiratory Medications and Adverse Reactions   SVN Group   #SVN Performed Yes   Given By: Mouthpiece   Date SVN Last Changed 10/02/18   Date SVN Next Change Due (Q 7 Days) 10/09/18   PEP/CPT Group   PEP/CPT/Airway Clearance Therapy Yes   #PEP/CPT (Manual) Subsequent Subsequent   PEP/CPT Method Flutter Valve   Date Disposable Equipment Last Changed 10/04/18   Date Disposable Equipment Next Change Due (Q 30 Days) 11/04/18   Chest Exam   Work Of Breathing / Effort Mild   Respiration 18   Pulse 92   Breath Sounds   RUL Breath Sounds Expiratory Wheezes;Diminished   RML Breath Sounds Expiratory Wheezes;Diminished   RLL Breath Sounds Expiratory Wheezes;Diminished   GEN Breath Sounds Expiratory Wheezes;Diminished   LLL Breath Sounds Expiratory Wheezes;Diminished   Secretions   Cough Non Productive   How Sputum Obtained Spontaneous   Oximetry   #Pulse Oximetry (Single Determination) Yes   Continuous Oximetry Yes   Oxygen   Home O2 Use Prior To Admission? No   Pulse Oximetry 95 %   O2 (LPM) 4  (decreased FI02)   O2 Daily Delivery Respiratory  Silicone Nasal Cannula

## 2018-10-05 NOTE — FLOWSHEET NOTE
10/04/18 2323   Events/Summary/Plan   Events/Summary/Plan SVN with flutter valve: FI02 decreased to 3.5 LPM   Interdisciplinary Plan of Care-Goals (Indications)   Bronchodilator Indications Physical Exam / Hyperinflation / Wheezing (bronchospasm)   Interdisciplinary Plan of Care-Outcomes    Bronchodilator Outcome Diminished Wheezing and Volume of Air Movement Increased   Education   Education Yes - Pt. / Family has been Instructed in use of Respiratory Medications and Adverse Reactions   RT Assessment of Delivered Medications   Evaluation of Medication Delivery Daily Yes-- Pt /Family has been Instructed in use of Respiratory Medications and Adverse Reactions   SVN Group   #SVN Performed Yes   Given By: Mouthpiece   Date SVN Last Changed 10/02/18   Date SVN Next Change Due (Q 7 Days) 10/09/18   PEP/CPT Group   PEP/CPT/Airway Clearance Therapy Yes   #PEP/CPT (Manual) Subsequent Subsequent   PEP/CPT Method Flutter Valve   Date Disposable Equipment Last Changed 10/04/18   Date Disposable Equipment Next Change Due (Q 30 Days) 11/04/18   Chest Exam   Work Of Breathing / Effort Mild;Moderate   Respiration (!) 22   Pulse 99   Breath Sounds   RUL Breath Sounds Diminished;Expiratory Wheezes   RML Breath Sounds Diminished;Expiratory Wheezes   RLL Breath Sounds Diminished   GEN Breath Sounds Diminished;Expiratory Wheezes   LLL Breath Sounds Diminished   Secretions   Cough Productive;Strong   How Sputum Obtained Spontaneous   Sputum Amount Small   Sputum Color Clear   Sputum Consistency Thick;Thin   Oximetry   #Pulse Oximetry (Single Determination) Yes   Continuous Oximetry Yes   Oxygen   Home O2 Use Prior To Admission? No   Pulse Oximetry 94 %   O2 (LPM) 3.5  (FI02 decreased to 3.5 LPM)   O2 Daily Delivery Respiratory  Silicone Nasal Cannula

## 2018-10-05 NOTE — PROGRESS NOTES
[facility] Medicine Daily Progress Note    Date of Service  10/5/2018    Chief Complaint  63 y.o. female admitted 10/2/2018 with shortness of breath and cough.    Hospital Course    63 y.o. female with a past medical history of HLD, Vitamin D Deficiency, Sciatica and Glaucoma presented 10/2/2018 initially presented to the Urgent care for flue life symptoms. She was unfortunately found to have a pulse-ox of 76%. As a result she was directed to the ER. In the ER patient has been complaining of of shortness of breath, with onset  1 week ago, progressively worsening, with feeling of congestion, productive cough, subjective chills, fatigue. She also has right elbow swelling which she was treated for bursitis by her physician. At this point patient will be admitted for evaluation of shortness of breath, with active treatment utilizing antibiotics, IV steroids and breathing treatments. Patient denies having chest pain.      Interval Problem Update  Dyspnea is improved, still wheezing and dyspneic, worsened with exertion relieved by rest. She is coming around to accept and understand her diagnosis.    Consultants/Specialty  Pulmonary, DR. Schaeffer.    Code Status  Full code.    Disposition  TBD.    Review of Systems  Review of Systems   Constitutional: Positive for malaise/fatigue. Negative for chills, fever and weight loss.   HENT: Positive for congestion and sinus pain.    Respiratory: Positive for cough, shortness of breath and wheezing.    Cardiovascular: Negative.  Negative for chest pain and leg swelling.   Gastrointestinal: Negative.  Negative for abdominal pain, nausea and vomiting.   Genitourinary: Negative.  Negative for dysuria and flank pain.   Musculoskeletal: Negative.  Negative for back pain and myalgias.   Neurological: Negative.  Negative for dizziness, loss of consciousness, weakness and headaches.   Endo/Heme/Allergies: Negative.  Does not bruise/bleed easily.   Psychiatric/Behavioral: Negative.  Negative for  depression. The patient is not nervous/anxious.    All other systems reviewed and are negative.       Physical Exam  Temp:  [36.4 °C (97.5 °F)-36.7 °C (98 °F)] 36.7 °C (98 °F)  Pulse:  [] 110  Resp:  [18-26] 22  BP: (125-154)/(60-84) 125/60    Physical Exam   Constitutional: She is oriented to person, place, and time. She appears well-developed and well-nourished. No distress.   HENT:   Head: Normocephalic and atraumatic.   Right Ear: External ear normal.   Left Ear: External ear normal.   Nose: Nose normal.   Mouth/Throat: No oropharyngeal exudate.   Eyes: Conjunctivae are normal. Right eye exhibits no discharge. Left eye exhibits no discharge. No scleral icterus.   Neck: No JVD present. No tracheal deviation present.   Cardiovascular: Normal rate and regular rhythm.    Pulmonary/Chest: Effort normal. No stridor. No respiratory distress. She has wheezes.   Abdominal: Soft. She exhibits no distension.   Musculoskeletal: She exhibits no edema or deformity.   Neurological: She is alert and oriented to person, place, and time.   Skin: Skin is warm and dry. Rash (small dry papules on back and arms not confluent) noted. She is not diaphoretic. No cyanosis. Nails show clubbing.   Psychiatric: She has a normal mood and affect. Her behavior is normal. Judgment and thought content normal.   Nursing note and vitals reviewed.      Fluids    Intake/Output Summary (Last 24 hours) at 10/05/18 1503  Last data filed at 10/05/18 1300   Gross per 24 hour   Intake              680 ml   Output              300 ml   Net              380 ml       Laboratory  Recent Labs      10/03/18   0111   WBC  9.1   RBC  5.14   HEMOGLOBIN  15.6   HEMATOCRIT  50.4*   MCV  98.1*   MCH  30.4   MCHC  31.0*   RDW  47.7   PLATELETCT  250   MPV  10.4     Recent Labs      10/03/18   0112   SODIUM  138   POTASSIUM  5.2   CHLORIDE  100   CO2  33   GLUCOSE  151*   BUN  14   CREATININE  0.85   CALCIUM  8.4                   Imaging  EC-ECHOCARDIOGRAM  COMPLETE W/ CONT   Final Result      DX-CHEST-PORTABLE (1 VIEW)   Final Result      No acute cardiopulmonary findings.           Assessment/Plan  * Acute on chronic respiratory failure with hypoxemia (HCC)- (present on admission)   Assessment & Plan    COPD exacerbation, needs diagnostics after discharge, appreciate pulmonology input.  Smoking cessation            Contact dermatitis   Assessment & Plan    By appearance  Hypoallergenic sheets  D/c tele patient may shower        Polycythemia   Assessment & Plan    Due to severe copd, improved.  Treat hypoxia.        Acute exacerbation of chronic obstructive pulmonary disease (COPD) (HCC)- (present on admission)   Assessment & Plan    Wheezing and air movement slightly better; but still very hypoxic even with little movement  Continue solumedrol high dose 125 mg every 6 hours  RT protocol  Smoking cessation            Recurrent sinusitis- (present on admission)   Assessment & Plan    Continue azithromycin, ENT referral as outpatient.        Hyperlipidemia- (present on admission)   Assessment & Plan    Diet controlled        Retinopathy- (present on admission)   Assessment & Plan    Born with retinopathy left eye blind, stable        Glaucoma- (present on admission)   Assessment & Plan    Continue timolol eyedrops no acute issues             VTE prophylaxis: lovenox

## 2018-10-05 NOTE — FLOWSHEET NOTE
10/05/18 0717   Interdisciplinary Plan of Care-Goals (Indications)   Bronchodilator Indications History / Diagnosis   RT Assessment of Delivered Medications   Evaluation of Medication Delivery Daily Yes-- Pt /Family has been Instructed in use of Respiratory Medications and Adverse Reactions   SVN Group   #SVN Performed Yes   Given By: Mouthpiece   PEP/CPT Group   PEP/CPT/Airway Clearance Therapy Yes   #PEP/CPT (Manual) Subsequent Subsequent   PEP/CPT Method Flutter Valve   Chest Exam   Work Of Breathing / Effort Mild   Respiration 20   Pulse 85   Breath Sounds   RUL Breath Sounds Diminished   RML Breath Sounds Diminished   RLL Breath Sounds Diminished   GEN Breath Sounds Diminished   LLL Breath Sounds Diminished   Oximetry   Continuous Oximetry Yes   Oxygen   Home O2 Use Prior To Admission? No   Pulse Oximetry 93 %   O2 (LPM) 3.5   O2 Daily Delivery Respiratory  Silicone Nasal Cannula

## 2018-10-05 NOTE — PROGRESS NOTES
Report received. Care assumed. Assist to BSC to void. Respirations shallow, unlabored. NC in place. O2 sat 93% on 5L. No c/o. Call light in reach.

## 2018-10-05 NOTE — CARE PLAN
Problem: Knowledge Deficit  Goal: Knowledge of the prescribed therapeutic regimen will improve  Outcome: PROGRESSING AS EXPECTED  Patient more receptive to medication and O2 titration education today. Discussed solumedrol and maintenance fluids, she verbalized understanding.    Problem: Fluid Volume:  Goal: Will maintain balanced intake and output  Outcome: PROGRESSING SLOWER THAN EXPECTED  Patient with slow PO intake due to resp status, urine dark yellow and concentrated. IV fluids started per MD order: NS @ 50 ml/hr.

## 2018-10-05 NOTE — PROGRESS NOTES
Report received from Angelica PAREDES. Pt resting in bed with eyes closed, O2 in place via N/C at 3.5L maintaining 95%.

## 2018-10-05 NOTE — ASSESSMENT & PLAN NOTE
By appearance, severe off antibiotics, hydrocortisone cream bid, shower  Hypoallergenic sheets  D/c tele patient may shower  Daily cetirizine, benadryl prn

## 2018-10-05 NOTE — PROGRESS NOTES
Bedside report given to Angelica PAREDES. POC discussed. Pt resting comfortably in bed. Safety precautions in place.

## 2018-10-05 NOTE — PROGRESS NOTES
Resting with eyes closed. Respirations even and unlabored. NC in place. O2 sat 93%. Call light in reach.

## 2018-10-05 NOTE — PROGRESS NOTES
Tele strip at 0710 shows SR w/ HR of 75  Measurements: .14/.08/.34    Tele Shift Summary:  Rhythm: SR/ST  Rate: 80's-110's  Ectopy: Per CCT Alanna, pt had rare PVC's.    Telemetry monitoring strips placed in pt chart.

## 2018-10-05 NOTE — FLOWSHEET NOTE
Breath sounds slightly improved today with less wheeze.     10/05/18 1052   RT Assessment of Delivered Medications   Evaluation of Medication Delivery Daily Yes-- Pt /Family has been Instructed in use of Respiratory Medications and Adverse Reactions   SVN Group   #SVN Performed Yes   Given By: Mouthpiece   PEP/CPT Group   PEP/CPT/Airway Clearance Therapy Yes   PEP/CPT Method Flutter Valve   Incentive Spirometry Group   Incentive Spirometer Volume 750 mL   Chest Exam   Work Of Breathing / Effort Mild   Respiration (!) 22   Pulse (!) 103   Breath Sounds   RUL Breath Sounds Diminished   RML Breath Sounds Diminished   RLL Breath Sounds Diminished   GEN Breath Sounds Diminished   LLL Breath Sounds Diminished   Oxygen   Home O2 Use Prior To Admission? No   Pulse Oximetry 93 %   O2 (LPM) 3.5   O2 Daily Delivery Respiratory  Silicone Nasal Cannula

## 2018-10-05 NOTE — FLOWSHEET NOTE
10/05/18 0235   Events/Summary/Plan   Events/Summary/Plan SVN with mask: sleeping t/o this tx, cont FI02 @ 3.5 LPM   Interdisciplinary Plan of Care-Goals (Indications)   Bronchodilator Indications Physical Exam / Hyperinflation / Wheezing (bronchospasm)   Interdisciplinary Plan of Care-Outcomes    Bronchodilator Outcome Diminished Wheezing and Volume of Air Movement Increased   Education   Education Yes - Pt. / Family has been Instructed in use of Respiratory Medications and Adverse Reactions   RT Assessment of Delivered Medications   Evaluation of Medication Delivery Daily Yes-- Pt /Family has been Instructed in use of Respiratory Medications and Adverse Reactions   SVN Group   #SVN Performed Yes   Given By: Mask   Date SVN Last Changed 10/02/18   Date SVN Next Change Due (Q 7 Days) 10/09/18   Chest Exam   Work Of Breathing / Effort Shallow   Respiration (!) 23   Pulse 90   Breath Sounds   RUL Breath Sounds Diminished   RML Breath Sounds Diminished   RLL Breath Sounds Diminished;Expiratory Wheezes   GEN Breath Sounds Diminished   LLL Breath Sounds Diminished;Expiratory Wheezes   Secretions   Cough Non Productive   Oximetry   #Pulse Oximetry (Single Determination) Yes   Continuous Oximetry Yes   Oxygen   Home O2 Use Prior To Admission? No   Pulse Oximetry 92 %   O2 (LPM) 3.5   O2 Daily Delivery Respiratory  Silicone Nasal Cannula

## 2018-10-05 NOTE — CARE PLAN
Problem: Oxygenation:  Goal: Maintain adequate oxygenation dependent on patient condition    Intervention: Manage oxygen therapy by monitoring pulse oximetry and/or ABG values  Oximetry 90-92% on 3.5 L nasal cannula, at end-shift 90% on 4.5 L nasal cannula.  Patient does not use oxygen at home.      Problem: Bronchoconstriction:  Goal: Improve in air movement and diminished wheezing    Intervention: Evaluate and manage medication effects  Patient is receiving duoneb nebulizers Q 4.  Breath sounds slightly improved today with less wheeze, still tight and very decreased throughout.      Problem: Bronchopulmonary Hygiene:  Goal: Increase mobilization of retained secretions    Intervention: Perform bronchopulmonary therapy as indicated by assessment  Aerobika flutter valve QID to assist with secretions clearance.

## 2018-10-05 NOTE — PROGRESS NOTES
Continues to rest. Respirations even and unlabored. NC in place. O2 sat 93% on 3.5 L. Call light in reach.     Tele Report:   Sinus Rhythm/ Sinus Tach  HR   PSVT to 159 at 2038, rare PVC  0.16/ 0.10/ 0.28

## 2018-10-05 NOTE — PROGRESS NOTES
[facility] Medicine Daily Progress Note    Date of Service  10/4/2018    Chief Complaint  63 y.o. female admitted 10/2/2018 with shortness of breath and cough.    Hospital Course    63 y.o. female with a past medical history of HLD, Vitamin D Deficiency, Sciatica and Glaucoma presented 10/2/2018 initially presented to the Urgent care for flue life symptoms. She was unfortunately found to have a pulse-ox of 76%. As a result she was directed to the ER. In the ER patient has been complaining of of shortness of breath, with onset  1 week ago, progressively worsening, with feeling of congestion, productive cough, subjective chills, fatigue. She also has right elbow swelling which she was treated for bursitis by her physician. At this point patient will be admitted for evaluation of shortness of breath, with active treatment utilizing antibiotics, IV steroids and breathing treatments. Patient denies having chest pain.      Interval Problem Update  She is still very short of breath and wheezing though she thinks she might feel little bit better.  She is starting to accept her diagnosis.  Discussing smoking cessation daily.  Desaturates down again into the mid 80s with any exertion and heart rate goes up to the 1 teens.    Consultants/Specialty  Pulmonary, DR. Schaeffer.    Code Status  Full code.    Disposition  TBD.    Review of Systems  Review of Systems   Constitutional: Negative.  Negative for chills, fever, malaise/fatigue and weight loss.   HENT: Positive for congestion and sinus pain.    Respiratory: Positive for shortness of breath and wheezing. Negative for cough.    Cardiovascular: Negative.  Negative for chest pain and leg swelling.   Gastrointestinal: Negative.  Negative for abdominal pain, nausea and vomiting.   Genitourinary: Negative.  Negative for dysuria and flank pain.   Musculoskeletal: Negative.  Negative for back pain and myalgias.   Neurological: Negative.  Negative for dizziness, loss of consciousness and  weakness.   Endo/Heme/Allergies: Negative.  Does not bruise/bleed easily.   Psychiatric/Behavioral: Negative.  Negative for depression. The patient is not nervous/anxious.    All other systems reviewed and are negative.       Physical Exam  Temp:  [36.4 °C (97.5 °F)-37.1 °C (98.8 °F)] 36.7 °C (98 °F)  Pulse:  [] 105  Resp:  [17-22] 22  BP: (109-142)/(61-86) 142/80    Physical Exam   Constitutional: She appears well-developed and well-nourished. No distress.   Patient seen and examined by me.   HENT:   Nose: Nose normal.   Mouth/Throat: Oropharynx is clear and moist. No oropharyngeal exudate.   Eyes: Conjunctivae are normal. Right eye exhibits no discharge. Left eye exhibits no discharge. No scleral icterus.   Neck: No JVD present. No tracheal deviation present.   Cardiovascular: Normal rate, regular rhythm and normal heart sounds.    Pulmonary/Chest: No stridor. She is in respiratory distress. She has wheezes. She has no rales. She exhibits no tenderness.   Abdominal: Soft. Bowel sounds are normal. She exhibits no distension. There is no tenderness.   Musculoskeletal: She exhibits no edema or tenderness.   Cyanosis and clubbing of nails   Neurological: She is alert. No cranial nerve deficit. She exhibits normal muscle tone.   Skin: Skin is warm and dry. She is not diaphoretic. No pallor.   Psychiatric: She has a normal mood and affect. Her behavior is normal.   Nursing note and vitals reviewed.      Fluids    Intake/Output Summary (Last 24 hours) at 10/04/18 1818  Last data filed at 10/04/18 1300   Gross per 24 hour   Intake              840 ml   Output              950 ml   Net             -110 ml       Laboratory  Recent Labs      10/02/18   0947  10/03/18   0111   WBC  12.3*  9.1   RBC  5.20  5.14   HEMOGLOBIN  15.6  15.6   HEMATOCRIT  49.3*  50.4*   MCV  94.8  98.1*   MCH  30.0  30.4   MCHC  31.6*  31.0*   RDW  45.1  47.7   PLATELETCT  263  250   MPV  10.1  10.4     Recent Labs      10/02/18   0947   10/03/18   0112   SODIUM  136  138   POTASSIUM  4.1  5.2   CHLORIDE  97  100   CO2  28  33   GLUCOSE  100*  151*   BUN  12  14   CREATININE  0.88  0.85   CALCIUM  8.8  8.4         Recent Labs      10/02/18   0947   BNPBTYPENAT  525*           Imaging  EC-ECHOCARDIOGRAM COMPLETE W/ CONT   Final Result      DX-CHEST-PORTABLE (1 VIEW)   Final Result      No acute cardiopulmonary findings.           Assessment/Plan  * Acute on chronic respiratory failure with hypoxemia (HCC)- (present on admission)   Assessment & Plan    COPD exacerbation, needs diagnostics after discharge, appreciate pulmonology input.  Smoking cessation  Increase steroids          Acute exacerbation of chronic obstructive pulmonary disease (COPD) (HCC)- (present on admission)   Assessment & Plan    Continued discussion of condition.  Rt protocol, solumedrol increase to 125mg patient still wheezing and very tight with hypoxia          Recurrent sinusitis- (present on admission)   Assessment & Plan    Continue azithromycin, ENT referral as outpatient.        Hyperlipidemia- (present on admission)   Assessment & Plan    Diet controlled        Retinopathy- (present on admission)   Assessment & Plan    Born with retinopathy left eye blind, stable        Glaucoma- (present on admission)   Assessment & Plan    Continue timolol eyedrops no acute issues             VTE prophylaxis: lovenox

## 2018-10-06 LAB — PROCALCITONIN SERPL-MCNC: <0.05 NG/ML

## 2018-10-06 PROCEDURE — 700102 HCHG RX REV CODE 250 W/ 637 OVERRIDE(OP): Performed by: HOSPITALIST

## 2018-10-06 PROCEDURE — 94668 MNPJ CHEST WALL SBSQ: CPT

## 2018-10-06 PROCEDURE — 700105 HCHG RX REV CODE 258: Performed by: HOSPITALIST

## 2018-10-06 PROCEDURE — A9270 NON-COVERED ITEM OR SERVICE: HCPCS | Performed by: HOSPITALIST

## 2018-10-06 PROCEDURE — 700101 HCHG RX REV CODE 250: Performed by: HOSPITALIST

## 2018-10-06 PROCEDURE — 700111 HCHG RX REV CODE 636 W/ 250 OVERRIDE (IP): Performed by: HOSPITALIST

## 2018-10-06 PROCEDURE — 94640 AIRWAY INHALATION TREATMENT: CPT

## 2018-10-06 PROCEDURE — 99232 SBSQ HOSP IP/OBS MODERATE 35: CPT | Performed by: HOSPITALIST

## 2018-10-06 PROCEDURE — 84145 PROCALCITONIN (PCT): CPT

## 2018-10-06 PROCEDURE — 770020 HCHG ROOM/CARE - TELE (206)

## 2018-10-06 PROCEDURE — 94760 N-INVAS EAR/PLS OXIMETRY 1: CPT

## 2018-10-06 RX ADMIN — NICOTINE 21 MG: 21 PATCH, EXTENDED RELEASE TRANSDERMAL at 05:46

## 2018-10-06 RX ADMIN — CETIRIZINE HYDROCHLORIDE 10 MG: 10 TABLET, FILM COATED ORAL at 05:47

## 2018-10-06 RX ADMIN — BUDESONIDE AND FORMOTEROL FUMARATE DIHYDRATE 2 PUFF: 80; 4.5 AEROSOL RESPIRATORY (INHALATION) at 18:13

## 2018-10-06 RX ADMIN — AZITHROMYCIN MONOHYDRATE 250 MG: 250 TABLET ORAL at 05:47

## 2018-10-06 RX ADMIN — IPRATROPIUM BROMIDE AND ALBUTEROL SULFATE 3 ML: .5; 3 SOLUTION RESPIRATORY (INHALATION) at 14:43

## 2018-10-06 RX ADMIN — IPRATROPIUM BROMIDE AND ALBUTEROL SULFATE 3 ML: .5; 3 SOLUTION RESPIRATORY (INHALATION) at 18:13

## 2018-10-06 RX ADMIN — SODIUM CHLORIDE: 9 INJECTION, SOLUTION INTRAVENOUS at 08:29

## 2018-10-06 RX ADMIN — TIMOLOL MALEATE 1 DROP: 5 SOLUTION OPHTHALMIC at 17:49

## 2018-10-06 RX ADMIN — SENNOSIDES AND DOCUSATE SODIUM 2 TABLET: 8.6; 5 TABLET ORAL at 05:47

## 2018-10-06 RX ADMIN — IPRATROPIUM BROMIDE AND ALBUTEROL SULFATE 3 ML: .5; 3 SOLUTION RESPIRATORY (INHALATION) at 10:38

## 2018-10-06 RX ADMIN — ENOXAPARIN SODIUM 40 MG: 100 INJECTION SUBCUTANEOUS at 05:47

## 2018-10-06 RX ADMIN — IPRATROPIUM BROMIDE AND ALBUTEROL SULFATE 3 ML: .5; 3 SOLUTION RESPIRATORY (INHALATION) at 06:59

## 2018-10-06 RX ADMIN — BUDESONIDE AND FORMOTEROL FUMARATE DIHYDRATE 2 PUFF: 80; 4.5 AEROSOL RESPIRATORY (INHALATION) at 07:04

## 2018-10-06 RX ADMIN — IPRATROPIUM BROMIDE AND ALBUTEROL SULFATE 3 ML: .5; 3 SOLUTION RESPIRATORY (INHALATION) at 22:06

## 2018-10-06 RX ADMIN — TIMOLOL MALEATE 1 DROP: 5 SOLUTION OPHTHALMIC at 05:47

## 2018-10-06 ASSESSMENT — ENCOUNTER SYMPTOMS
BRUISES/BLEEDS EASILY: 0
ABDOMINAL PAIN: 0
WEAKNESS: 0
CARDIOVASCULAR NEGATIVE: 1
NEUROLOGICAL NEGATIVE: 1
SHORTNESS OF BREATH: 1
BACK PAIN: 0
NAUSEA: 0
NERVOUS/ANXIOUS: 0
MYALGIAS: 0
GASTROINTESTINAL NEGATIVE: 1
CONSTITUTIONAL NEGATIVE: 1
MUSCULOSKELETAL NEGATIVE: 1
CHILLS: 0
SINUS PAIN: 1
WEIGHT LOSS: 0
DIZZINESS: 0
DEPRESSION: 0
COUGH: 1
PSYCHIATRIC NEGATIVE: 1
FLANK PAIN: 0
VOMITING: 0
FEVER: 0
LOSS OF CONSCIOUSNESS: 0

## 2018-10-06 ASSESSMENT — PAIN SCALES - GENERAL
PAINLEVEL_OUTOF10: 0

## 2018-10-06 NOTE — PROGRESS NOTES
Report given to Cielo PAREDES. POC discussed. Pt resting comfortably in bed. Safety precautions in place.

## 2018-10-06 NOTE — FLOWSHEET NOTE
10/05/18 2225   Events/Summary/Plan   Events/Summary/Plan SVN with MP domingo well without flutter this treatment, patient feels she is getting better   Interdisciplinary Plan of Care-Goals (Indications)   Bronchodilator Indications Physical Exam / Hyperinflation / Wheezing (bronchospasm)   Interdisciplinary Plan of Care-Outcomes    Bronchodilator Outcome Patient at Stable Baseline   Education   Education Yes - Pt. / Family has been Instructed in use of Respiratory Medications and Adverse Reactions   RT Assessment of Delivered Medications   Evaluation of Medication Delivery Daily Yes-- Pt /Family has been Instructed in use of Respiratory Medications and Adverse Reactions   SVN Group   #SVN Performed Yes   Given By: Mouthpiece   Date SVN Last Changed 10/02/18   Date SVN Next Change Due (Q 7 Days) 10/09/18   Chest Exam   Work Of Breathing / Effort Shallow   Respiration (!) 22   Pulse 93   Breath Sounds   Pre/Post Intervention Pre Intervention Assessment   RUL Breath Sounds Diminished   RML Breath Sounds Diminished   RLL Breath Sounds Diminished   GEN Breath Sounds Diminished   LLL Breath Sounds Diminished   Secretions   Cough Non Productive   Oximetry   #Pulse Oximetry (Single Determination) Yes   Continuous Oximetry Yes   Oxygen   Home O2 Use Prior To Admission? No   Pulse Oximetry 94 %   O2 (LPM) 4   O2 Daily Delivery Respiratory  Silicone Nasal Cannula

## 2018-10-06 NOTE — FLOWSHEET NOTE
10/06/18 0700   Interdisciplinary Plan of Care-Goals (Indications)   Bronchodilator Indications Physical Exam / Hyperinflation / Wheezing (bronchospasm);History / Diagnosis   SVN Group   #SVN Performed Yes   Given By: Mouthpiece   MDI/DPI Group   #MDI/DPI Given MDI/DPI x 1   PEP/CPT Group   PEP/CPT/Airway Clearance Therapy Yes   PEP/CPT Method Flutter Valve   Incentive Spirometry Group   Incentive Spirometer Volume 750 mL   Chest Exam   Work Of Breathing / Effort Mild;Shallow   Respiration 20   Pulse 99   Breath Sounds   Pre/Post Intervention (occ'l faint wheeze)   RUL Breath Sounds Diminished   RML Breath Sounds Diminished   RLL Breath Sounds Diminished   GEN Breath Sounds Diminished   LLL Breath Sounds Diminished   Secretions   Cough Congested;Moist;Non Productive   Oxygen   Pulse Oximetry 91 %   O2 (LPM) 3.5   O2 Daily Delivery Respiratory  Silicone Nasal Cannula

## 2018-10-06 NOTE — CARE PLAN
Problem: Safety  Goal: Will remain free from injury  Outcome: PROGRESSING AS EXPECTED  Non-skid socks in use. Call light in reach. Pt instructed to call for help. Hourly rounding complete. Bed locked and in low position. Pt verbalized understanding of safety care plan.      Problem: Infection  Goal: Will remain free from infection  Outcome: PROGRESSING AS EXPECTED  Pt educated on handwashing and hygiene. Standard precautions implemented. Assessed for s/s of infections. VS and labs monitored. Pt verbalized understanding of infection prevention care plan.

## 2018-10-06 NOTE — FLOWSHEET NOTE
I started theraPEP with patient since she is having difficulty using the IS.  Patient demonstrates good technique on IS and Aerobika flutter device.     10/06/18 1038   SVN Group   #SVN Performed Yes   Given By: Mouthpiece   PEP/CPT Group   PEP/CPT/Airway Clearance Therapy Yes   #PEP/CPT (Manual) Subsequent Subsequent   PEP/CPT Method Flutter Valve;Positive Airway Pressure Device   Chest Exam   Work Of Breathing / Effort Mild   Respiration 18   Pulse 83   Breath Sounds   RUL Breath Sounds Diminished   RML Breath Sounds Diminished   RLL Breath Sounds Diminished   GEN Breath Sounds Diminished   LLL Breath Sounds Diminished   Oxygen   Home O2 Use Prior To Admission? No   Pulse Oximetry 93 %   O2 (LPM) 3.5   O2 Daily Delivery Respiratory  Silicone Nasal Cannula

## 2018-10-06 NOTE — CARE PLAN
Problem: Safety  Goal: Will remain free from injury  Outcome: PROGRESSING AS EXPECTED    Goal: Will remain free from falls  Outcome: PROGRESSING AS EXPECTED  Calls for help    Problem: Knowledge Deficit  Goal: Knowledge of disease process/condition, treatment plan, diagnostic tests, and medications will improve  Outcome: PROGRESSING AS EXPECTED    Goal: Knowledge of the prescribed therapeutic regimen will improve  Outcome: PROGRESSING AS EXPECTED      Problem: Respiratory:  Goal: Respiratory status will improve  Outcome: PROGRESSING SLOWER THAN EXPECTED  Breathing techniques need reinforcement, O2 at 3.5L

## 2018-10-06 NOTE — PROGRESS NOTES
Bedside report given to Thang PAREDES. POC discussed. Pt resting comfortably in bed. Safety precautions in place.

## 2018-10-06 NOTE — PROGRESS NOTES
Tele strip at 0732 shows SR w/ HR of 92  Measurements: .14/.08/.34    Tele Shift Summary:  Rhythm: SR/ST  Rate: 90's-110's  Ectopy: Per JUSTO Florentino, pt had occ PVC's.    Telemetry monitoring strips placed in pt chart.

## 2018-10-06 NOTE — PROGRESS NOTES
Assessment complete. Pt reports no pain or discomfort. Oral care performed. Safety precautions in place. Call light in reach.

## 2018-10-06 NOTE — FLOWSHEET NOTE
10/05/18 1823   Events/Summary/Plan   Events/Summary/Plan SVN with MDI symbicort f/w rinsing of mouth   Interdisciplinary Plan of Care-Goals (Indications)   Bronchodilator Indications Physical Exam / Hyperinflation / Wheezing (bronchospasm)   Bronchopulmonary Hygiene Indications Difficulty with Secretion Clearance   Interdisciplinary Plan of Care-Outcomes    Bronchodilator Outcome Patient at Stable Baseline   Bronchopulmonary Hygiene Outcome Patient Subjective Impression of Less Retention and Improved Clearance   Education   Education Yes - Pt. / Family has been Instructed in use of Respiratory Equipment;Yes - Pt. / Family has been Instructed in use of Respiratory Medications and Adverse Reactions   RT Assessment of Delivered Medications   Evaluation of Medication Delivery Daily Yes-- Pt /Family has been Instructed in use of Respiratory Medications and Adverse Reactions   SVN Group   #SVN Performed Yes   Given By: Mouthpiece   MDI/DPI Group   #MDI/DPI Given MDI/DPI x 1   PEP/CPT Group   PEP/CPT/Airway Clearance Therapy Yes   #PEP/CPT (Manual) Subsequent Subsequent   PEP/CPT Method Flutter Valve   Date Disposable Equipment Last Changed 10/04/18   Date Disposable Equipment Next Change Due (Q 30 Days) 11/04/18   Chest Exam   Work Of Breathing / Effort Shallow   Respiration (!) 23   Pulse (!) 112   Breath Sounds   RUL Breath Sounds Diminished   RML Breath Sounds Diminished   RLL Breath Sounds Diminished   GEN Breath Sounds Diminished   LLL Breath Sounds Diminished   Secretions   Cough Productive   How Sputum Obtained Spontaneous   Sputum Amount Small   Sputum Color Clear   Sputum Consistency Thick;Thin   Oximetry   #Pulse Oximetry (Single Determination) Yes   Continuous Oximetry Yes   Oxygen   Home O2 Use Prior To Admission? No   Pulse Oximetry 90 %   O2 (LPM) 4.5   O2 Daily Delivery Respiratory  Silicone Nasal Cannula

## 2018-10-07 LAB
BACTERIA BLD CULT: NORMAL
BACTERIA BLD CULT: NORMAL
SIGNIFICANT IND 70042: NORMAL
SIGNIFICANT IND 70042: NORMAL
SITE SITE: NORMAL
SITE SITE: NORMAL
SOURCE SOURCE: NORMAL
SOURCE SOURCE: NORMAL

## 2018-10-07 PROCEDURE — 99232 SBSQ HOSP IP/OBS MODERATE 35: CPT | Performed by: HOSPITALIST

## 2018-10-07 PROCEDURE — A9270 NON-COVERED ITEM OR SERVICE: HCPCS | Performed by: HOSPITALIST

## 2018-10-07 PROCEDURE — 94668 MNPJ CHEST WALL SBSQ: CPT

## 2018-10-07 PROCEDURE — 94640 AIRWAY INHALATION TREATMENT: CPT

## 2018-10-07 PROCEDURE — 770020 HCHG ROOM/CARE - TELE (206)

## 2018-10-07 PROCEDURE — 700101 HCHG RX REV CODE 250: Performed by: HOSPITALIST

## 2018-10-07 PROCEDURE — 700111 HCHG RX REV CODE 636 W/ 250 OVERRIDE (IP): Performed by: HOSPITALIST

## 2018-10-07 PROCEDURE — 700102 HCHG RX REV CODE 250 W/ 637 OVERRIDE(OP): Performed by: HOSPITALIST

## 2018-10-07 PROCEDURE — 94760 N-INVAS EAR/PLS OXIMETRY 1: CPT

## 2018-10-07 RX ORDER — DIPHENHYDRAMINE HCL 25 MG
25 TABLET ORAL EVERY 6 HOURS PRN
Status: DISCONTINUED | OUTPATIENT
Start: 2018-10-07 | End: 2018-10-09 | Stop reason: HOSPADM

## 2018-10-07 RX ORDER — IPRATROPIUM BROMIDE AND ALBUTEROL SULFATE 2.5; .5 MG/3ML; MG/3ML
3 SOLUTION RESPIRATORY (INHALATION)
Status: DISCONTINUED | OUTPATIENT
Start: 2018-10-08 | End: 2018-10-09 | Stop reason: HOSPADM

## 2018-10-07 RX ADMIN — IPRATROPIUM BROMIDE AND ALBUTEROL SULFATE 3 ML: .5; 3 SOLUTION RESPIRATORY (INHALATION) at 19:00

## 2018-10-07 RX ADMIN — BUDESONIDE AND FORMOTEROL FUMARATE DIHYDRATE 2 PUFF: 80; 4.5 AEROSOL RESPIRATORY (INHALATION) at 19:02

## 2018-10-07 RX ADMIN — TIMOLOL MALEATE 1 DROP: 5 SOLUTION OPHTHALMIC at 06:31

## 2018-10-07 RX ADMIN — HYDROCORTISONE: 25 CREAM TOPICAL at 17:45

## 2018-10-07 RX ADMIN — DIPHENHYDRAMINE HCL 25 MG: 25 TABLET ORAL at 11:41

## 2018-10-07 RX ADMIN — HYDROCORTISONE: 25 CREAM TOPICAL at 12:53

## 2018-10-07 RX ADMIN — IPRATROPIUM BROMIDE AND ALBUTEROL SULFATE 3 ML: .5; 3 SOLUTION RESPIRATORY (INHALATION) at 02:24

## 2018-10-07 RX ADMIN — IPRATROPIUM BROMIDE AND ALBUTEROL SULFATE 3 ML: .5; 3 SOLUTION RESPIRATORY (INHALATION) at 11:00

## 2018-10-07 RX ADMIN — IPRATROPIUM BROMIDE AND ALBUTEROL SULFATE 3 ML: .5; 3 SOLUTION RESPIRATORY (INHALATION) at 14:21

## 2018-10-07 RX ADMIN — DIPHENHYDRAMINE HCL 25 MG: 25 TABLET ORAL at 17:44

## 2018-10-07 RX ADMIN — BUDESONIDE AND FORMOTEROL FUMARATE DIHYDRATE 2 PUFF: 80; 4.5 AEROSOL RESPIRATORY (INHALATION) at 06:51

## 2018-10-07 RX ADMIN — TIMOLOL MALEATE 1 DROP: 5 SOLUTION OPHTHALMIC at 17:44

## 2018-10-07 RX ADMIN — CETIRIZINE HYDROCHLORIDE 10 MG: 10 TABLET, FILM COATED ORAL at 06:18

## 2018-10-07 RX ADMIN — ENOXAPARIN SODIUM 40 MG: 100 INJECTION SUBCUTANEOUS at 06:18

## 2018-10-07 RX ADMIN — NICOTINE 21 MG: 21 PATCH, EXTENDED RELEASE TRANSDERMAL at 06:19

## 2018-10-07 RX ADMIN — IPRATROPIUM BROMIDE AND ALBUTEROL SULFATE 3 ML: .5; 3 SOLUTION RESPIRATORY (INHALATION) at 06:46

## 2018-10-07 ASSESSMENT — ENCOUNTER SYMPTOMS
VOMITING: 0
SHORTNESS OF BREATH: 1
LOSS OF CONSCIOUSNESS: 0
DIZZINESS: 0
NEUROLOGICAL NEGATIVE: 1
GASTROINTESTINAL NEGATIVE: 1
MUSCULOSKELETAL NEGATIVE: 1
CARDIOVASCULAR NEGATIVE: 1
PSYCHIATRIC NEGATIVE: 1
NAUSEA: 0
FEVER: 0
WEAKNESS: 0
MYALGIAS: 0
CHILLS: 0
DEPRESSION: 0
BACK PAIN: 0
COUGH: 1
WEIGHT LOSS: 0
FLANK PAIN: 0
NERVOUS/ANXIOUS: 0
ABDOMINAL PAIN: 0
BRUISES/BLEEDS EASILY: 0

## 2018-10-07 ASSESSMENT — PAIN SCALES - GENERAL
PAINLEVEL_OUTOF10: 0
PAINLEVEL_OUTOF10: 4

## 2018-10-07 NOTE — PROGRESS NOTES
Received bedside report from REGGIE Hazel. Plan of care discussed. Safety precautions in place. Call light and personal belongings within reach. Patient has no needs at this time.

## 2018-10-07 NOTE — FLOWSHEET NOTE
10/07/18 0648   Events/Summary/Plan   Events/Summary/Plan Tx given, flutter    Non-Invasive Resp Device Site Inspection Completed Intact   Interdisciplinary Plan of Care-Goals (Indications)   Bronchodilator Indications History / Diagnosis   Bronchopulmonary Hygiene Indications Difficulty with Secretion Clearance   Interdisciplinary Plan of Care-Outcomes    Bronchodilator Outcome Diminished Wheezing and Volume of Air Movement Increased   Bronchopulmonary Hygiene Outcome Patient Subjective Impression of Less Retention and Improved Clearance   Education   Education Yes - Pt. / Family has been Instructed in use of Respiratory Medications and Adverse Reactions;Yes - Pt. / Family has been Instructed in use of Respiratory Equipment   RT Assessment of Delivered Medications   Evaluation of Medication Delivery Daily Yes-- Pt /Family has been Instructed in use of Respiratory Medications and Adverse Reactions   SVN Group   #SVN Performed Yes   Given By: Mouthpiece   MDI/DPI Group   #MDI/DPI Given MDI/DPI x 1   PEP/CPT Group   PEP/CPT/Airway Clearance Therapy Yes   #PEP/CPT (Manual) Subsequent Subsequent   PEP/CPT Method Flutter Valve   Chest Exam   Respiration 18   Heart Rate (Monitored) 90   Breath Sounds   Pre/Post Intervention Pre Intervention Assessment  (air improvement and subjective post tx)   RUL Breath Sounds Diminished;Expiratory Wheezes   RML Breath Sounds Diminished;Expiratory Wheezes   RLL Breath Sounds Diminished;Expiratory Wheezes   GEN Breath Sounds Diminished;Expiratory Wheezes   LLL Breath Sounds Diminished;Expiratory Wheezes   Secretions   Cough Non Productive;Congested   How Sputum Obtained Spontaneous   Oximetry   Continuous Oximetry Yes   Oxygen   Pulse Oximetry 94 %   O2 (LPM) 3.5   O2 Daily Delivery Respiratory  Silicone Nasal Cannula

## 2018-10-07 NOTE — CARE PLAN
Problem: Safety  Goal: Will remain free from injury  Outcome: PROGRESSING AS EXPECTED  Remind patient to use call light and provide assistance. Bed in low position, bed locked, and appropriate alarms set. Patient wearing non-slip socks. Call light and personal belongings are within reach.     Problem: Respiratory:  Goal: Respiratory status will improve  Outcome: PROGRESSING AS EXPECTED  Assess and monitor pulmonary status and titrate oxygen to maintain SPo2 above 90%. RT per protocol. Encourage incentive spirometer, deep breathing, turning, and coughing.

## 2018-10-07 NOTE — FLOWSHEET NOTE
10/06/18 1813   Events/Summary/Plan   Non-Invasive Resp Device Site Inspection Completed Intact   Interdisciplinary Plan of Care-Goals (Indications)   Bronchodilator Indications History / Diagnosis;Physical Exam / Hyperinflation / Wheezing (bronchospasm)   Bronchopulmonary Hygiene Indications Difficulty with Secretion Clearance   Interdisciplinary Plan of Care-Outcomes    Bronchodilator Outcome Patient at Stable Baseline   Bronchopulmonary Hygiene Outcome Resolution / Improvement in Recent Chest X-Ray   Education   Education Yes - Pt. / Family has been Instructed in use of Respiratory Equipment;Yes - Pt. / Family has been Instructed in use of Respiratory Medications and Adverse Reactions   RT Assessment of Delivered Medications   Evaluation of Medication Delivery Daily Yes-- Pt /Family has been Instructed in use of Respiratory Medications and Adverse Reactions   SVN Group   #SVN Performed Yes   Given By: Mouthpiece   MDI/DPI Group   #MDI/DPI Given MDI/DPI x 1   PEP/CPT Group   PEP/CPT/Airway Clearance Therapy Yes   #PEP/CPT (Manual) Subsequent Subsequent   PEP/CPT Method Flutter Valve   Incentive Spirometry Group   Incentive Spirometry Instruction Yes   Breathing Exercises Yes   Incentive Spirometer Volume 750 mL   Respiratory WDL   Respiratory (WDL) X   Breath Sounds   RUL Breath Sounds Diminished   GEN Breath Sounds Clear;Diminished   Secretions   Cough Dry   How Sputum Obtained Spontaneous   Oximetry   #Pulse Oximetry (Single Determination) Yes   Continuous Oximetry Yes   O2 Alarms Set & Reviewed Yes   Oxygen   Home O2 Use Prior To Admission? No   Pulse Oximetry 93 %   O2 (LPM) 3.5   O2 Daily Delivery Respiratory  Silicone Nasal Cannula

## 2018-10-07 NOTE — PROGRESS NOTES
[facility] Medicine Daily Progress Note    Date of Service  10/7/2018    Chief Complaint  63 y.o. female admitted 10/2/2018 with shortness of breath and cough.    Hospital Course    63 y.o. female with a past medical history of HLD, Vitamin D Deficiency, Sciatica and Glaucoma presented 10/2/2018 initially presented to the Urgent care for flue life symptoms. She was unfortunately found to have a pulse-ox of 76%. As a result she was directed to the ER. In the ER patient has been complaining of of shortness of breath, with onset  1 week ago, progressively worsening, with feeling of congestion, productive cough, subjective chills, fatigue. She also has right elbow swelling which she was treated for bursitis by her physician. At this point patient will be admitted for evaluation of shortness of breath, with active treatment utilizing antibiotics, IV steroids and breathing treatments. Patient denies having chest pain.      Interval Problem Update  Shortness of breath at rest much improved; desats less down to mid 80s instead of 70s.  Rash on back is worse and very itchy    Consultants/Specialty  Pulmonary, DR. Schaeffer.    Code Status  Full code.    Disposition  TBD.    Review of Systems  Review of Systems   Constitutional: Positive for malaise/fatigue. Negative for chills, fever and weight loss.   HENT: Negative.    Respiratory: Positive for cough and shortness of breath.    Cardiovascular: Negative.  Negative for chest pain and leg swelling.   Gastrointestinal: Negative.  Negative for abdominal pain, nausea and vomiting.   Genitourinary: Negative.  Negative for dysuria and flank pain.   Musculoskeletal: Negative.  Negative for back pain and myalgias.   Skin: Positive for itching and rash.   Neurological: Negative.  Negative for dizziness, loss of consciousness and weakness.   Endo/Heme/Allergies: Negative.  Does not bruise/bleed easily.   Psychiatric/Behavioral: Negative.  Negative for depression. The patient is not  nervous/anxious.    All other systems reviewed and are negative.       Physical Exam  Temp:  [36.5 °C (97.7 °F)-37.1 °C (98.8 °F)] 36.6 °C (97.9 °F)  Pulse:  [] 97  Resp:  [16-19] 18  BP: (105-144)/(62-83) 144/83    Physical Exam   Constitutional: She is oriented to person, place, and time. She appears well-developed and well-nourished. No distress.   Patient seen and examined by me.   HENT:   Nose: Nose normal.   Mouth/Throat: Oropharynx is clear and moist. No oropharyngeal exudate.   Eyes: Conjunctivae are normal. Right eye exhibits no discharge. Left eye exhibits no discharge. No scleral icterus.   Neck: No JVD present. No tracheal deviation present.   Cardiovascular: Normal rate, regular rhythm and normal heart sounds.    Pulmonary/Chest: Effort normal. No stridor. No respiratory distress. She has wheezes (few). She has no rales. She exhibits no tenderness.   Much better air movement   Abdominal: Soft. Bowel sounds are normal. She exhibits no distension. There is no tenderness.   Musculoskeletal: She exhibits no edema or tenderness.   Neurological: She is alert and oriented to person, place, and time. No cranial nerve deficit. She exhibits normal muscle tone.   Skin: Skin is warm and dry. Rash noted. She is not diaphoretic. There is erythema. No pallor.   Diffuse maculopapular erythematous rash mostly on back, also on arms and legs   Psychiatric: She has a normal mood and affect. Her behavior is normal. Judgment and thought content normal.   Nursing note and vitals reviewed.      Fluids    Intake/Output Summary (Last 24 hours) at 10/07/18 1653  Last data filed at 10/07/18 1046   Gross per 24 hour   Intake              480 ml   Output             1200 ml   Net             -720 ml       Laboratory                        Imaging  EC-ECHOCARDIOGRAM COMPLETE W/ CONT   Final Result      DX-CHEST-PORTABLE (1 VIEW)   Final Result      No acute cardiopulmonary findings.           Assessment/Plan  * Acute on chronic  respiratory failure with hypoxemia (HCC)- (present on admission)   Assessment & Plan    COPD exacerbation, needs diagnostics after discharge, appreciate pulmonology input.  Smoking cessation            Contact dermatitis   Assessment & Plan    By appearance, worsening, off antibiotics, hydrocortisone cream bid, shower  Hypoallergenic sheets  D/c tele patient may shower  Daily cetirizine, benadryl prn        Polycythemia   Assessment & Plan    Due to severe copd, improved.  Treat hypoxia.        Acute exacerbation of chronic obstructive pulmonary disease (COPD) (HCC)- (present on admission)   Assessment & Plan    Wheezing and air movement starting to significantly improve hypoxia with exertion improving, drops to mid 80s instead of 70s  Continue solumedrol high dose 125 mg every 6 hours, still not able to wean yet  RT protocol  Smoking cessation            Recurrent sinusitis- (present on admission)   Assessment & Plan    Continue azithromycin, ENT referral as outpatient.        Hyperlipidemia- (present on admission)   Assessment & Plan    Diet controlled        Retinopathy- (present on admission)   Assessment & Plan    Born with retinopathy left eye blind, stable        Glaucoma- (present on admission)   Assessment & Plan    Continue timolol eyedrops no acute issues             VTE prophylaxis: lovenox

## 2018-10-07 NOTE — CARE PLAN
Problem: Safety  Goal: Will remain free from injury  Outcome: PROGRESSING AS EXPECTED  Understands the need to call for assistance   Goal: Will remain free from falls  Outcome: PROGRESSING AS EXPECTED      Problem: Respiratory:  Goal: Respiratory status will improve  Outcome: PROGRESSING AS EXPECTED  Tolerates increased in activity

## 2018-10-07 NOTE — FLOWSHEET NOTE
10/06/18 2206   Interdisciplinary Plan of Care-Goals (Indications)   Bronchodilator Indications History / Diagnosis   Bronchopulmonary Hygiene Indications Difficulty with Secretion Clearance   Interdisciplinary Plan of Care-Outcomes    Bronchodilator Outcome Patient at Stable Baseline   Bronchopulmonary Hygiene Outcome Resolution / Improvement in Recent Chest X-Ray   Education   Education Yes - Pt. / Family has been Instructed in use of Respiratory Equipment;Yes - Pt. / Family has been Instructed in use of Respiratory Medications and Adverse Reactions   RT Assessment of Delivered Medications   Evaluation of Medication Delivery Daily Yes-- Pt /Family has been Instructed in use of Respiratory Medications and Adverse Reactions   SVN Group   #SVN Performed Yes   Given By: Mouthpiece   Respiratory WDL   Respiratory (WDL) X   Chest Exam   Respiration 19   Pulse 86   Heart Rate (Monitored) 86   Breath Sounds   RUL Breath Sounds Diminished   GEN Breath Sounds Diminished   Secretions   Cough Dry   How Sputum Obtained Spontaneous   Oximetry   #Pulse Oximetry (Single Determination) Yes   Continuous Oximetry Yes   O2 Alarms Set & Reviewed Yes   Oxygen   Home O2 Use Prior To Admission? No   Pulse Oximetry 95 %   O2 (LPM) 3.5   O2 Daily Delivery Respiratory  Silicone Nasal Cannula

## 2018-10-07 NOTE — FLOWSHEET NOTE
10/07/18 0224   Interdisciplinary Plan of Care-Goals (Indications)   Bronchodilator Indications History / Diagnosis   Bronchopulmonary Hygiene Indications Difficulty with Secretion Clearance   Interdisciplinary Plan of Care-Outcomes    Bronchodilator Outcome Patient at Stable Baseline   Bronchopulmonary Hygiene Outcome Resolution / Improvement in Recent Chest X-Ray   Education   Education Yes - Pt. / Family has been Instructed in use of Respiratory Equipment;Yes - Pt. / Family has been Instructed in use of Respiratory Medications and Adverse Reactions   RT Assessment of Delivered Medications   Evaluation of Medication Delivery Daily Yes-- Pt /Family has been Instructed in use of Respiratory Medications and Adverse Reactions   SVN Group   #SVN Performed Yes   Given By: Mouthpiece   Respiratory WDL   Respiratory (WDL) X   Chest Exam   Respiration 17   Pulse 96   Heart Rate (Monitored) 96   Breath Sounds   RUL Breath Sounds Diminished   GEN Breath Sounds Diminished   Secretions   Cough Moist;Non Productive   Oximetry   #Pulse Oximetry (Single Determination) Yes   Continuous Oximetry Yes   O2 Alarms Set & Reviewed Yes   Oxygen   Home O2 Use Prior To Admission? No   Pulse Oximetry 93 %   O2 (LPM) 3.5   O2 Daily Delivery Respiratory  Silicone Nasal Cannula

## 2018-10-07 NOTE — PROGRESS NOTES
Gave bedside report to REGGIE Hazel. Plan of care discussed. Safety precautions in place. Personal belongings and call light are with in reach. Patient has no additional needs at this time.

## 2018-10-07 NOTE — PROGRESS NOTES
Telemetry summary: SR/ST (low 100s) with rare PACs and PVCs. .14/.08/.30 .     Still on O2 at 3.5LNC humidified. Dyspnea on exertion. Did desat to high 70s in the am upon ambulation to commode. Recovered after instruction of proper breathing techniques and rest. I recommend standby assist on ambulation because of desaturation and activity intolerance. Will pass on report. Rests on the low 90s in bed. Was dangling in bed most of the day Tolerated well. No chest pain. No complaints of pain. Will continue to monitor.

## 2018-10-07 NOTE — FLOWSHEET NOTE
10/07/18 1422   Events/Summary/Plan   Events/Summary/Plan Decreased O2 to 2.5 L , tolerating wellFlutter and SVN   Non-Invasive Resp Device Site Inspection Completed Intact   Interdisciplinary Plan of Care-Goals (Indications)   Bronchodilator Indications History / Diagnosis   Interdisciplinary Plan of Care-Outcomes    Bronchodilator Outcome Diminished Wheezing and Volume of Air Movement Increased;Improved Vital Signs and Measures of Gas Exchange   Education   Education Yes - Pt. / Family has been Instructed in use of Respiratory Medications and Adverse Reactions;Yes - Pt. / Family has been Instructed in use of Respiratory Equipment   RT Assessment of Delivered Medications   Evaluation of Medication Delivery Daily Yes-- Pt /Family has been Instructed in use of Respiratory Medications and Adverse Reactions   SVN Group   #SVN Performed Yes   Given By: Mouthpiece   PEP/CPT Group   PEP/CPT/Airway Clearance Therapy Yes   #PEP/CPT (Manual) Subsequent Subsequent   PEP/CPT Method Flutter Valve   Chest Exam   Respiration 18   Pulse 99   Breath Sounds   Pre/Post Intervention Post Intervention Assessment   RUL Breath Sounds Clear;Expiratory Wheezes   RML Breath Sounds Diminished;Expiratory Wheezes   RLL Breath Sounds Diminished   GEN Breath Sounds Clear;Expiratory Wheezes   LLL Breath Sounds Diminished   Oximetry   Continuous Oximetry Yes   Oxygen   Home O2 Use Prior To Admission? No   Pulse Oximetry 93 %   O2 (LPM) 2.5   O2 Daily Delivery Respiratory  Silicone Nasal Cannula

## 2018-10-07 NOTE — PROGRESS NOTES
Telemetry Shift Summary    Rhythm SR  HR Range 70s- Low 90s  Ectopy rPVC, rPAC  Measurements 0.14/0.08/0.36        Normal Values  Rhythm SR  HR Range    Measurements 0.12-0.20 / 0.06-0.10  / 0.30-0.52

## 2018-10-07 NOTE — FLOWSHEET NOTE
10/07/18 1103   Events/Summary/Plan   Events/Summary/Plan Tx given, flutter done   Non-Invasive Resp Device Site Inspection Completed Intact   Interdisciplinary Plan of Care-Goals (Indications)   Bronchodilator Indications History / Diagnosis   Interdisciplinary Plan of Care-Outcomes    Bronchodilator Outcome Improved Vital Signs and Measures of Gas Exchange   Education   Education Yes - Pt. / Family has been Instructed in use of Respiratory Medications and Adverse Reactions;Yes - Pt. / Family has been Instructed in use of Respiratory Equipment   SVN Group   #SVN Performed Yes   Given By: Mouthpiece   PEP/CPT Group   PEP/CPT/Airway Clearance Therapy Yes   #PEP/CPT (Manual) Subsequent Subsequent   PEP/CPT Method Flutter Valve   Respiratory WDL   Respiratory (WDL) X   Chest Exam   Respiration 18   Heart Rate (Monitored) 94   Breath Sounds   Pre/Post Intervention Post Intervention Assessment   RUL Breath Sounds Clear   RML Breath Sounds Diminished   RLL Breath Sounds Diminished   GEN Breath Sounds Clear   LLL Breath Sounds Diminished   Oximetry   #Pulse Oximetry (Single Determination) Yes   Oxygen   Pulse Oximetry 91 %   O2 (LPM) 3.5   O2 Daily Delivery Respiratory  Silicone Nasal Cannula

## 2018-10-07 NOTE — PROGRESS NOTES
[facility] Medicine Daily Progress Note    Date of Service  10/6/2018    Chief Complaint  63 y.o. female admitted 10/2/2018 with shortness of breath and cough.    Hospital Course    63 y.o. female with a past medical history of HLD, Vitamin D Deficiency, Sciatica and Glaucoma presented 10/2/2018 initially presented to the Urgent care for flue life symptoms. She was unfortunately found to have a pulse-ox of 76%. As a result she was directed to the ER. In the ER patient has been complaining of of shortness of breath, with onset  1 week ago, progressively worsening, with feeling of congestion, productive cough, subjective chills, fatigue. She also has right elbow swelling which she was treated for bursitis by her physician. At this point patient will be admitted for evaluation of shortness of breath, with active treatment utilizing antibiotics, IV steroids and breathing treatments. Patient denies having chest pain.      Interval Problem Update  Dyspnea improving.  Rash is improving.    Consultants/Specialty  Pulmonary, DR. Schaeffer.    Code Status  Full code.    Disposition  TBD.    Review of Systems  Review of Systems   Constitutional: Negative.  Negative for chills, fever, malaise/fatigue and weight loss.   HENT: Positive for congestion and sinus pain.    Respiratory: Positive for cough and shortness of breath.    Cardiovascular: Negative.  Negative for chest pain and leg swelling.   Gastrointestinal: Negative.  Negative for abdominal pain, nausea and vomiting.   Genitourinary: Negative.  Negative for dysuria and flank pain.   Musculoskeletal: Negative.  Negative for back pain and myalgias.   Skin: Positive for rash.   Neurological: Negative.  Negative for dizziness, loss of consciousness and weakness.   Endo/Heme/Allergies: Negative.  Does not bruise/bleed easily.   Psychiatric/Behavioral: Negative.  Negative for depression. The patient is not nervous/anxious.    All other systems reviewed and are negative.        Physical Exam  Temp:  [36.3 °C (97.4 °F)-37 °C (98.6 °F)] 36.7 °C (98 °F)  Pulse:  [] 93  Resp:  [18-23] 18  BP: (122-138)/(68-77) 122/68    Physical Exam   Constitutional: She is oriented to person, place, and time. She appears well-developed and well-nourished. No distress.   HENT:   Head: Normocephalic and atraumatic.   Nose: Nose normal.   Mouth/Throat: Oropharynx is clear and moist. No oropharyngeal exudate.   Eyes: Conjunctivae are normal. Right eye exhibits no discharge. Left eye exhibits no discharge. No scleral icterus.   Neck: Neck supple. No JVD present.   Cardiovascular: Normal rate and regular rhythm.    Pulmonary/Chest: Effort normal. No stridor. No respiratory distress. She has wheezes (Improved).   Air movement better   Abdominal: Soft. She exhibits no distension.   Musculoskeletal: She exhibits no edema or tenderness.   Clubbing   Neurological: She is alert and oriented to person, place, and time.   Skin: Skin is warm and dry. Rash (small dry papules on back and arms not confluent) noted. She is not diaphoretic. No cyanosis. Nails show clubbing.   Psychiatric: She has a normal mood and affect. Her behavior is normal. Judgment and thought content normal.   Nursing note and vitals reviewed.      Fluids    Intake/Output Summary (Last 24 hours) at 10/06/18 1800  Last data filed at 10/06/18 1636   Gross per 24 hour   Intake          1374.17 ml   Output              800 ml   Net           574.17 ml       Laboratory                        Imaging  EC-ECHOCARDIOGRAM COMPLETE W/ CONT   Final Result      DX-CHEST-PORTABLE (1 VIEW)   Final Result      No acute cardiopulmonary findings.           Assessment/Plan  * Acute on chronic respiratory failure with hypoxemia (HCC)- (present on admission)   Assessment & Plan    COPD exacerbation, needs diagnostics after discharge, appreciate pulmonology input.  Smoking cessation            Contact dermatitis   Assessment & Plan    By appearance  Hypoallergenic  sheets  D/c tele patient may shower        Polycythemia   Assessment & Plan    Due to severe copd, improved.  Treat hypoxia.        Acute exacerbation of chronic obstructive pulmonary disease (COPD) (HCC)- (present on admission)   Assessment & Plan    Wheezing and air movement starting to significantly improve; but still very hypoxic even with little movement  Continue solumedrol high dose 125 mg every 6 hours  RT protocol  Smoking cessation            Recurrent sinusitis- (present on admission)   Assessment & Plan    Continue azithromycin, ENT referral as outpatient.        Hyperlipidemia- (present on admission)   Assessment & Plan    Diet controlled        Retinopathy- (present on admission)   Assessment & Plan    Born with retinopathy left eye blind, stable        Glaucoma- (present on admission)   Assessment & Plan    Continue timolol eyedrops no acute issues             VTE prophylaxis: lovenox

## 2018-10-08 LAB
ANION GAP SERPL CALC-SCNC: 4 MMOL/L (ref 0–11.9)
BASOPHILS # BLD AUTO: 0.2 % (ref 0–1.8)
BASOPHILS # BLD: 0.02 K/UL (ref 0–0.12)
BUN SERPL-MCNC: 14 MG/DL (ref 8–22)
CALCIUM SERPL-MCNC: 8.5 MG/DL (ref 8.4–10.2)
CHLORIDE SERPL-SCNC: 100 MMOL/L (ref 96–112)
CO2 SERPL-SCNC: 34 MMOL/L (ref 20–33)
CREAT SERPL-MCNC: 0.79 MG/DL (ref 0.5–1.4)
EOSINOPHIL # BLD AUTO: 0.77 K/UL (ref 0–0.51)
EOSINOPHIL NFR BLD: 7.1 % (ref 0–6.9)
ERYTHROCYTE [DISTWIDTH] IN BLOOD BY AUTOMATED COUNT: 48 FL (ref 35.9–50)
GLUCOSE SERPL-MCNC: 103 MG/DL (ref 65–99)
HCT VFR BLD AUTO: 48.9 % (ref 37–47)
HGB BLD-MCNC: 15.2 G/DL (ref 12–16)
IMM GRANULOCYTES # BLD AUTO: 0.05 K/UL (ref 0–0.11)
IMM GRANULOCYTES NFR BLD AUTO: 0.5 % (ref 0–0.9)
LYMPHOCYTES # BLD AUTO: 0.68 K/UL (ref 1–4.8)
LYMPHOCYTES NFR BLD: 6.2 % (ref 22–41)
MCH RBC QN AUTO: 29.6 PG (ref 27–33)
MCHC RBC AUTO-ENTMCNC: 31.1 G/DL (ref 33.6–35)
MCV RBC AUTO: 95.3 FL (ref 81.4–97.8)
MONOCYTES # BLD AUTO: 1 K/UL (ref 0–0.85)
MONOCYTES NFR BLD AUTO: 9.2 % (ref 0–13.4)
NEUTROPHILS # BLD AUTO: 8.39 K/UL (ref 2–7.15)
NEUTROPHILS NFR BLD: 76.8 % (ref 44–72)
NRBC # BLD AUTO: 0 K/UL
NRBC BLD-RTO: 0 /100 WBC
PLATELET # BLD AUTO: 221 K/UL (ref 164–446)
PMV BLD AUTO: 9.7 FL (ref 9–12.9)
POTASSIUM SERPL-SCNC: 4 MMOL/L (ref 3.6–5.5)
RBC # BLD AUTO: 5.13 M/UL (ref 4.2–5.4)
SODIUM SERPL-SCNC: 138 MMOL/L (ref 135–145)
WBC # BLD AUTO: 10.9 K/UL (ref 4.8–10.8)

## 2018-10-08 PROCEDURE — 700102 HCHG RX REV CODE 250 W/ 637 OVERRIDE(OP): Performed by: HOSPITALIST

## 2018-10-08 PROCEDURE — 700111 HCHG RX REV CODE 636 W/ 250 OVERRIDE (IP): Performed by: HOSPITALIST

## 2018-10-08 PROCEDURE — A9270 NON-COVERED ITEM OR SERVICE: HCPCS | Performed by: HOSPITALIST

## 2018-10-08 PROCEDURE — 94668 MNPJ CHEST WALL SBSQ: CPT

## 2018-10-08 PROCEDURE — 700101 HCHG RX REV CODE 250: Performed by: HOSPITALIST

## 2018-10-08 PROCEDURE — 94760 N-INVAS EAR/PLS OXIMETRY 1: CPT

## 2018-10-08 PROCEDURE — 85025 COMPLETE CBC W/AUTO DIFF WBC: CPT

## 2018-10-08 PROCEDURE — 80048 BASIC METABOLIC PNL TOTAL CA: CPT

## 2018-10-08 PROCEDURE — 700105 HCHG RX REV CODE 258: Performed by: HOSPITALIST

## 2018-10-08 PROCEDURE — 94640 AIRWAY INHALATION TREATMENT: CPT

## 2018-10-08 PROCEDURE — 770006 HCHG ROOM/CARE - MED/SURG/GYN SEMI*

## 2018-10-08 PROCEDURE — 99232 SBSQ HOSP IP/OBS MODERATE 35: CPT | Performed by: HOSPITALIST

## 2018-10-08 RX ADMIN — IPRATROPIUM BROMIDE AND ALBUTEROL SULFATE 3 ML: .5; 3 SOLUTION RESPIRATORY (INHALATION) at 06:36

## 2018-10-08 RX ADMIN — IPRATROPIUM BROMIDE AND ALBUTEROL SULFATE 3 ML: .5; 3 SOLUTION RESPIRATORY (INHALATION) at 20:14

## 2018-10-08 RX ADMIN — IPRATROPIUM BROMIDE AND ALBUTEROL SULFATE 3 ML: .5; 3 SOLUTION RESPIRATORY (INHALATION) at 10:57

## 2018-10-08 RX ADMIN — DIPHENHYDRAMINE HCL 25 MG: 25 TABLET ORAL at 06:53

## 2018-10-08 RX ADMIN — SODIUM CHLORIDE: 9 INJECTION, SOLUTION INTRAVENOUS at 21:40

## 2018-10-08 RX ADMIN — ENOXAPARIN SODIUM 40 MG: 100 INJECTION SUBCUTANEOUS at 06:49

## 2018-10-08 RX ADMIN — TIMOLOL MALEATE 1 DROP: 5 SOLUTION OPHTHALMIC at 06:53

## 2018-10-08 RX ADMIN — HYDROCORTISONE: 25 CREAM TOPICAL at 10:12

## 2018-10-08 RX ADMIN — IPRATROPIUM BROMIDE AND ALBUTEROL SULFATE 3 ML: .5; 3 SOLUTION RESPIRATORY (INHALATION) at 15:22

## 2018-10-08 RX ADMIN — BUDESONIDE AND FORMOTEROL FUMARATE DIHYDRATE 2 PUFF: 80; 4.5 AEROSOL RESPIRATORY (INHALATION) at 06:36

## 2018-10-08 RX ADMIN — SODIUM CHLORIDE: 9 INJECTION, SOLUTION INTRAVENOUS at 00:45

## 2018-10-08 RX ADMIN — BUDESONIDE AND FORMOTEROL FUMARATE DIHYDRATE 2 PUFF: 80; 4.5 AEROSOL RESPIRATORY (INHALATION) at 20:15

## 2018-10-08 RX ADMIN — NICOTINE 21 MG: 21 PATCH, EXTENDED RELEASE TRANSDERMAL at 06:48

## 2018-10-08 RX ADMIN — HYDROCORTISONE: 25 CREAM TOPICAL at 18:36

## 2018-10-08 RX ADMIN — DIPHENHYDRAMINE HCL 25 MG: 25 TABLET ORAL at 00:10

## 2018-10-08 RX ADMIN — DIPHENHYDRAMINE HCL 25 MG: 25 TABLET ORAL at 18:40

## 2018-10-08 RX ADMIN — TIMOLOL MALEATE 1 DROP: 5 SOLUTION OPHTHALMIC at 18:35

## 2018-10-08 RX ADMIN — CETIRIZINE HYDROCHLORIDE 10 MG: 10 TABLET, FILM COATED ORAL at 06:47

## 2018-10-08 ASSESSMENT — ENCOUNTER SYMPTOMS
ABDOMINAL PAIN: 0
BACK PAIN: 0
SHORTNESS OF BREATH: 1
DIZZINESS: 0
CARDIOVASCULAR NEGATIVE: 1
HEMOPTYSIS: 0
DEPRESSION: 0
FLANK PAIN: 0
CONSTITUTIONAL NEGATIVE: 1
WHEEZING: 1
MUSCULOSKELETAL NEGATIVE: 1
NEUROLOGICAL NEGATIVE: 1
PSYCHIATRIC NEGATIVE: 1
MYALGIAS: 0
WEAKNESS: 0
COUGH: 1
NAUSEA: 0
WEIGHT LOSS: 0
GASTROINTESTINAL NEGATIVE: 1
SPUTUM PRODUCTION: 0
SINUS PAIN: 0
CHILLS: 0
NERVOUS/ANXIOUS: 0
FEVER: 0
LOSS OF CONSCIOUSNESS: 0
BRUISES/BLEEDS EASILY: 0
VOMITING: 0

## 2018-10-08 ASSESSMENT — PAIN SCALES - GENERAL
PAINLEVEL_OUTOF10: 4
PAINLEVEL_OUTOF10: 0
PAINLEVEL_OUTOF10: 0

## 2018-10-08 NOTE — FLOWSHEET NOTE
10/07/18 5107   Events/Summary/Plan   Events/Summary/Plan SVN refused. patient wants to sleep.    Therapy Not Performed   Type of Therapy Not Performed SVN   Reason Therapy Not Performed Refused   Chest Exam   Respiration 16   Pulse 96   Oxygen   Pulse Oximetry 95 %   O2 (LPM) 2.5

## 2018-10-08 NOTE — FLOWSHEET NOTE
10/08/18 0637   Interdisciplinary Plan of Care-Goals (Indications)   Bronchodilator Indications History / Diagnosis   Bronchopulmonary Hygiene Indications Difficulty with Secretion Clearance   Interdisciplinary Plan of Care-Outcomes    Bronchodilator Outcome Patient at Stable Baseline   Bronchopulmonary Hygiene Outcome Breath Sounds from Diminished to Adventitious with Rhonchi Cleared by Cough   Education   Education Yes - Pt. / Family has been Instructed in use of Respiratory Equipment;Yes - Pt. / Family has been Instructed in use of Respiratory Medications and Adverse Reactions   RT Assessment of Delivered Medications   Evaluation of Medication Delivery Daily Yes-- Pt /Family has been Instructed in use of Respiratory Medications and Adverse Reactions   SVN Group   #SVN Performed Yes   Given By: Mouthpiece   MDI/DPI Group   #MDI/DPI Given MDI/DPI x 1   PEP/CPT Group   PEP/CPT/Airway Clearance Therapy Yes   #PEP/CPT (Manual) Subsequent Subsequent   PEP/CPT Method Flutter Valve   Respiratory WDL   Respiratory (WDL) X   Chest Exam   Work Of Breathing / Effort Mild   Respiration 17   Pulse 94   Breath Sounds   Pre/Post Intervention Pre Intervention Assessment   RUL Breath Sounds Clear   RML Breath Sounds Diminished   RLL Breath Sounds Diminished   GEN Breath Sounds Rhonchi   LLL Breath Sounds Diminished   Secretions   Cough Moist;Productive   How Sputum Obtained Spontaneous   Sputum Amount Small   Sputum Color Clear   Sputum Consistency Thick;Thin   Oximetry   #Pulse Oximetry (Single Determination) Yes   Oxygen   Pulse Oximetry 92 %   O2 (LPM) 2.5   O2 Daily Delivery Respiratory  Silicone Nasal Cannula

## 2018-10-08 NOTE — PROGRESS NOTES
Telemetry Shift Summary    Rhythm SR/ST  HR Range 80s-110s  Ectopy rPVC, rCoup  Measurements 0.12/0.08/0.32        Normal Values  Rhythm SR  HR Range    Measurements 0.12-0.20 / 0.06-0.10  / 0.30-0.52

## 2018-10-08 NOTE — PROGRESS NOTES
"Telemetry summary: SR with rare PACs and PVCs. .14/.08/.34     Upon initial assessment, generalized rash appears worse than yesterday. MD informed. Given hydrocortisone cream and benadryl. Verbalized that she \" feels better.\" Tolerates activity well with occasional desaturation to high 80s. Still on 3.5L of O2.Others no other acute problems. Will continue to monitor.   "

## 2018-10-08 NOTE — FLOWSHEET NOTE
10/08/18 1522   Interdisciplinary Plan of Care-Goals (Indications)   Bronchodilator Indications History / Diagnosis   Bronchopulmonary Hygiene Indications Difficulty with Secretion Clearance   Interdisciplinary Plan of Care-Outcomes    Bronchodilator Outcome Patient at Stable Baseline   Bronchopulmonary Hygiene Outcome Patient at Stable Baseline   Education   Education Yes - Pt. / Family has been Instructed in use of Respiratory Equipment;Yes - Pt. / Family has been Instructed in use of Respiratory Medications and Adverse Reactions   RT Assessment of Delivered Medications   Evaluation of Medication Delivery Daily Yes-- Pt /Family has been Instructed in use of Respiratory Medications and Adverse Reactions   SVN Group   #SVN Performed Yes   Given By: Mouthpiece   PEP/CPT Group   PEP/CPT/Airway Clearance Therapy Yes   #PEP/CPT (Manual) Subsequent Subsequent   PEP/CPT Method Flutter Valve   Respiratory WDL   Respiratory (WDL) X   Chest Exam   Work Of Breathing / Effort Mild   Respiration 18   Pulse 94   Breath Sounds   Pre/Post Intervention Pre Intervention Assessment   RUL Breath Sounds Clear   RML Breath Sounds Diminished   RLL Breath Sounds Diminished   GEN Breath Sounds Clear;Diminished   LLL Breath Sounds Diminished   Secretions   Cough Moist;Non Productive   How Sputum Obtained Spontaneous   Oximetry   #Pulse Oximetry (Single Determination) Yes   Oxygen   Pulse Oximetry 90 %   O2 (LPM) 2.5   O2 Daily Delivery Respiratory  Silicone Nasal Cannula

## 2018-10-08 NOTE — FLOWSHEET NOTE
10/07/18 1902   Events/Summary/Plan   Events/Summary/Plan SVN MDI PEP   Interdisciplinary Plan of Care-Goals (Indications)   Bronchodilator Indications History / Diagnosis   Bronchopulmonary Hygiene Indications Difficulty with Secretion Clearance   Interdisciplinary Plan of Care-Outcomes    Bronchodilator Outcome Patient at Stable Baseline   Bronchopulmonary Hygiene Outcome Patient at Stable Baseline   Education   Education Yes - Pt. / Family has been Instructed in use of Respiratory Equipment;Yes - Pt. / Family has been Instructed in use of Respiratory Medications and Adverse Reactions   RT Assessment of Delivered Medications   Evaluation of Medication Delivery Daily Yes-- Pt /Family has been Instructed in use of Respiratory Medications and Adverse Reactions   SVN Group   #SVN Performed Yes   Given By: Mouthpiece   MDI/DPI Group   #MDI/DPI Given MDI/DPI x 1   PEP/CPT Group   PEP/CPT/Airway Clearance Therapy Yes   #PEP/CPT (Manual) Subsequent Subsequent   PEP/CPT Method Flutter Valve   Incentive Spirometry Group   Incentive Spirometry Instruction Yes   Breathing Exercises Yes   Incentive Spirometer Volume 1000 mL   Respiratory WDL   Respiratory (WDL) X   Chest Exam   Respiration 16   Pulse 100   Breath Sounds   Pre/Post Intervention Post Intervention Assessment   RUL Breath Sounds Rhonchi   RML Breath Sounds Diminished   RLL Breath Sounds Diminished   GEN Breath Sounds Rhonchi   LLL Breath Sounds Diminished   Oximetry   Continuous Oximetry Yes   Oxygen   Pulse Oximetry 94 %   O2 (LPM) 2.5   O2 Daily Delivery Respiratory  Silicone Nasal Cannula

## 2018-10-08 NOTE — FLOWSHEET NOTE
10/08/18 1057   Interdisciplinary Plan of Care-Goals (Indications)   Bronchodilator Indications History / Diagnosis   Bronchopulmonary Hygiene Indications Difficulty with Secretion Clearance   Interdisciplinary Plan of Care-Outcomes    Bronchodilator Outcome Patient at Stable Baseline   Bronchopulmonary Hygiene Outcome Patient at Stable Baseline   Education   Education Yes - Pt. / Family has been Instructed in use of Respiratory Equipment;Yes - Pt. / Family has been Instructed in use of Respiratory Medications and Adverse Reactions   RT Assessment of Delivered Medications   Evaluation of Medication Delivery Daily Yes-- Pt /Family has been Instructed in use of Respiratory Medications and Adverse Reactions   SVN Group   #SVN Performed Yes   Given By: Mouthpiece   PEP/CPT Group   PEP/CPT/Airway Clearance Therapy Yes   #PEP/CPT (Manual) Subsequent Subsequent   PEP/CPT Method Flutter Valve   Respiratory WDL   Respiratory (WDL) X   Chest Exam   Work Of Breathing / Effort Mild   Respiration 17   Pulse 80   Breath Sounds   Pre/Post Intervention Pre Intervention Assessment   RUL Breath Sounds Clear   RML Breath Sounds Diminished   RLL Breath Sounds Diminished   GEN Breath Sounds Clear   LLL Breath Sounds Diminished   Secretions   Cough Moist;Productive   How Sputum Obtained Spontaneous   Oximetry   #Pulse Oximetry (Single Determination) Yes   Oxygen   Pulse Oximetry 95 %   O2 (LPM) 2.5   O2 Daily Delivery Respiratory  Silicone Nasal Cannula

## 2018-10-08 NOTE — CARE PLAN
Problem: Safety  Goal: Will remain free from injury  Outcome: PROGRESSING AS EXPECTED  Calls for assistance upon ambulation  Goal: Will remain free from falls  Outcome: PROGRESSING AS EXPECTED      Problem: Knowledge Deficit  Goal: Knowledge of disease process/condition, treatment plan, diagnostic tests, and medications will improve  Outcome: PROGRESSING AS EXPECTED  Verbalized understanding of respiratory education and return demonstrates proper breathing techniques  Goal: Knowledge of the prescribed therapeutic regimen will improve  Outcome: PROGRESSING AS EXPECTED      Problem: Respiratory:  Goal: Respiratory status will improve  Outcome: PROGRESSING AS EXPECTED  Activity intolerance improved    Problem: Mobility  Goal: Risk for activity intolerance will decrease  Outcome: PROGRESSING AS EXPECTED

## 2018-10-09 VITALS
RESPIRATION RATE: 17 BRPM | DIASTOLIC BLOOD PRESSURE: 75 MMHG | SYSTOLIC BLOOD PRESSURE: 145 MMHG | TEMPERATURE: 97.9 F | BODY MASS INDEX: 34.14 KG/M2 | HEART RATE: 88 BPM | HEIGHT: 64 IN | WEIGHT: 199.96 LBS | OXYGEN SATURATION: 92 %

## 2018-10-09 PROCEDURE — 700102 HCHG RX REV CODE 250 W/ 637 OVERRIDE(OP): Performed by: HOSPITALIST

## 2018-10-09 PROCEDURE — 94640 AIRWAY INHALATION TREATMENT: CPT

## 2018-10-09 PROCEDURE — 700111 HCHG RX REV CODE 636 W/ 250 OVERRIDE (IP): Performed by: HOSPITALIST

## 2018-10-09 PROCEDURE — 99239 HOSP IP/OBS DSCHRG MGMT >30: CPT | Performed by: INTERNAL MEDICINE

## 2018-10-09 PROCEDURE — 94668 MNPJ CHEST WALL SBSQ: CPT

## 2018-10-09 PROCEDURE — 94760 N-INVAS EAR/PLS OXIMETRY 1: CPT

## 2018-10-09 PROCEDURE — 700101 HCHG RX REV CODE 250: Performed by: HOSPITALIST

## 2018-10-09 PROCEDURE — 99406 BEHAV CHNG SMOKING 3-10 MIN: CPT

## 2018-10-09 PROCEDURE — A9270 NON-COVERED ITEM OR SERVICE: HCPCS | Performed by: HOSPITALIST

## 2018-10-09 RX ORDER — NICOTINE 21 MG/24HR
1 PATCH, TRANSDERMAL 24 HOURS TRANSDERMAL EVERY 24 HOURS
Qty: 7 PATCH | Refills: 0 | Status: SHIPPED | OUTPATIENT
Start: 2018-10-09 | End: 2018-10-16

## 2018-10-09 RX ORDER — TIOTROPIUM BROMIDE 18 UG/1
18 CAPSULE ORAL; RESPIRATORY (INHALATION) DAILY
Qty: 30 CAP | Refills: 3 | Status: SHIPPED | OUTPATIENT
Start: 2018-10-09 | End: 2019-01-29 | Stop reason: SDUPTHER

## 2018-10-09 RX ORDER — BUDESONIDE AND FORMOTEROL FUMARATE DIHYDRATE 80; 4.5 UG/1; UG/1
2 AEROSOL RESPIRATORY (INHALATION) 2 TIMES DAILY
Qty: 1 INHALER | Refills: 3 | Status: SHIPPED | OUTPATIENT
Start: 2018-10-09 | End: 2019-01-29 | Stop reason: SDUPTHER

## 2018-10-09 RX ORDER — DIPHENHYDRAMINE HCL 25 MG
25 TABLET ORAL EVERY 6 HOURS PRN
Qty: 30 TAB | Refills: 0 | Status: SHIPPED | OUTPATIENT
Start: 2018-10-09 | End: 2018-10-16

## 2018-10-09 RX ORDER — ALBUTEROL SULFATE 90 UG/1
2 AEROSOL, METERED RESPIRATORY (INHALATION) EVERY 6 HOURS PRN
Qty: 8.5 G | Refills: 1 | Status: SHIPPED | OUTPATIENT
Start: 2018-10-09 | End: 2019-06-11

## 2018-10-09 RX ADMIN — DIPHENHYDRAMINE HCL 25 MG: 25 TABLET ORAL at 18:17

## 2018-10-09 RX ADMIN — IPRATROPIUM BROMIDE AND ALBUTEROL SULFATE 3 ML: .5; 3 SOLUTION RESPIRATORY (INHALATION) at 06:42

## 2018-10-09 RX ADMIN — TIMOLOL MALEATE 1 DROP: 5 SOLUTION OPHTHALMIC at 06:20

## 2018-10-09 RX ADMIN — NICOTINE 21 MG: 21 PATCH, EXTENDED RELEASE TRANSDERMAL at 06:16

## 2018-10-09 RX ADMIN — HYDROCORTISONE: 25 CREAM TOPICAL at 06:24

## 2018-10-09 RX ADMIN — DIPHENHYDRAMINE HCL 25 MG: 25 TABLET ORAL at 00:46

## 2018-10-09 RX ADMIN — HYDROCORTISONE: 25 CREAM TOPICAL at 18:17

## 2018-10-09 RX ADMIN — DIPHENHYDRAMINE HCL 25 MG: 25 TABLET ORAL at 06:15

## 2018-10-09 RX ADMIN — ENOXAPARIN SODIUM 40 MG: 100 INJECTION SUBCUTANEOUS at 06:18

## 2018-10-09 RX ADMIN — IPRATROPIUM BROMIDE AND ALBUTEROL SULFATE 3 ML: .5; 3 SOLUTION RESPIRATORY (INHALATION) at 14:12

## 2018-10-09 RX ADMIN — BUDESONIDE AND FORMOTEROL FUMARATE DIHYDRATE 2 PUFF: 80; 4.5 AEROSOL RESPIRATORY (INHALATION) at 06:42

## 2018-10-09 RX ADMIN — CETIRIZINE HYDROCHLORIDE 10 MG: 10 TABLET, FILM COATED ORAL at 06:14

## 2018-10-09 ASSESSMENT — PAIN SCALES - GENERAL: PAINLEVEL_OUTOF10: 0

## 2018-10-09 NOTE — CARE PLAN
Problem: Oxygenation:  Goal: Maintain adequate oxygenation dependent on patient condition  Respiratory Therapy Update      T  Events/Summary/Plan: pt remains on 2.5 LPM txs QID . Using aerobika during tx;s

## 2018-10-09 NOTE — FLOWSHEET NOTE
10/09/18 0642   Events/Summary/Plan   Events/Summary/Plan SOB with sats 86% on 2.5 lpm NC.  Just back from the bathroom.   Interdisciplinary Plan of Care-Goals (Indications)   Bronchodilator Indications PFT / Reduced Airflow;Physical Exam / Hyperinflation / Wheezing (bronchospasm)   Interdisciplinary Plan of Care-Outcomes    Bronchodilator Outcome Patient at Stable Baseline   Education   Education Yes - Pt. / Family has been Instructed in use of Respiratory Equipment;Yes - Pt. / Family has been Instructed in use of Respiratory Medications and Adverse Reactions   Therapy Not Performed   Type of Therapy Not Performed PEP   Reason Therapy Not Performed (too SOB)   RT Assessment of Delivered Medications   Evaluation of Medication Delivery Daily Yes-- Pt /Family has been Instructed in use of Respiratory Medications and Adverse Reactions   SVN Group   #SVN Performed Yes   Given By: Mouthpiece   MDI/DPI Group   #MDI/DPI Given MDI/DPI x 1   Incentive Spirometry Group   Incentive Spirometry Instruction Yes   Breathing Exercises Yes   Incentive Spirometer Volume 750 mL   Respiratory WDL   Respiratory (WDL) X   Chest Exam   Work Of Breathing / Effort Mild;Increased Work of Breathing;Shallow   Respiration (!) 24   Heart Rate (Monitored) 88   Breath Sounds   Pre/Post Intervention Pre Intervention Assessment   RUL Breath Sounds Diminished;Expiratory Wheezes   RML Breath Sounds Diminished;Expiratory Wheezes   RLL Breath Sounds Diminished   EGN Breath Sounds Diminished   LLL Breath Sounds Diminished   Secretions   Cough Congested;Non Productive;Strong   How Sputum Obtained Spontaneous   Sputum Amount Unable to Evaluate   Oximetry   #Pulse Oximetry (Single Determination) Yes   Oxygen   Home O2 Use Prior To Admission? No   Pulse Oximetry (!) 86 %  (Just back from restroom.)   O2 (LPM) 2.5   O2 Daily Delivery Respiratory  Silicone Nasal Cannula

## 2018-10-09 NOTE — DISCHARGE PLANNING
Care Transition Team Assessment    Information Source Patient  Orientation : Oriented x 4  Information Given By: Patient  Who is responsible for making decisions for patient? : Patient    Elopement Risk  Legal Hold: No  Ambulatory or Self Mobile in Wheelchair: No-Not an Elopement Risk  Disoriented: No  Psychiatric Symptoms: None  History of Wandering: No  Elopement this Admit: No  Vocalizing Wanting to Leave: No  Displays Behaviors, Body Language Wanting to Leave: No-Not at Risk for Elopement  Elopement Risk: Not at Risk for Elopement    Interdisciplinary Discharge Planning  Patient or legal guardian wants to designate a caregiver (see row info): No  Durable Medical Equipment: Not Applicable    Discharge Preparedness  What is your plan after discharge?: Home with help  What are your discharge supports?: Sibling  Prior Functional Level: Ambulatory, Other (Comments) (Does not drive)    Functional Assesment  Prior Functional Level: Ambulatory, Other (Comments) (Does not drive)    Finances  Financial Barriers to Discharge: No  Prescription Coverage: Yes    Vision / Hearing Impairment  Vision Impairment : Yes  Right Eye Vision: Impaired, Wears Glasses  Left Eye Vision: Blind (Blind)  Hearing Impairment : No    Values / Beliefs / Concerns  Values / Beliefs Concerns : No    Domestic Abuse  Have you ever been the victim of abuse or violence?: No  Physical Abuse or Sexual Abuse: No  Verbal Abuse or Emotional Abuse: No  Possible Abuse Reported to:: Not Applicable    Psychological Assessment  History of Substance Abuse: None    Discharge Risks or Barriers  Discharge risks or barriers?: No    Anticipated Discharge Information  Anticipated discharge disposition: Home

## 2018-10-09 NOTE — FACE TO FACE
Face to Face Note  -  Durable Medical Equipment    Jackie Galvez M.D. - NPI: [id number]  I certify that this patient is under my care and that they had a durable medical equipment(DME)face to face encounter by myself that meets the physician DME face-to-face encounter requirements with this patient on:    Date of encounter:   Patient:                    MRN:                       YOB: 2020  Kyra Funez  [id number]  1955     The encounter with the patient was in whole, or in part, for the following medical condition, which is the primary reason for durable medical equipment:  COPD    I certify that, based on my findings, the following durable medical equipment is medically necessary:  Oxygen. With concentrator. Pulse oximetry    HOME O2 Saturation Measurements:(Values must be present for Home Oxygen orders)  Room air sat at rest: 87  Room air sat with amb: 75  With liters of O2: 3, O2 sat at rest with O2: 93  With Liters of O2: 5, O2 sat with amb with O2 : 88  Is the patient mobile?: Yes    My Clinical findings support the need for the above equipment due to:  Hypoxia    Supporting Symptoms: shortness of breath

## 2018-10-09 NOTE — CARE PLAN
Problem: Infection  Goal: Will remain free from infection  Outcome: PROGRESSING AS EXPECTED  Assess and monitor for signs and symptoms of infection. Perform hand hygiene before and after patient contact and entering/exiting the room.    Problem: Respiratory:  Goal: Respiratory status will improve  Outcome: PROGRESSING AS EXPECTED  Assess and monitor pulmonary status and titrate oxygen to maintain SPo2 above 90%. RT per protocol. Encourage incentive spirometer, deep breathing, turning, and coughing.

## 2018-10-09 NOTE — DISCHARGE PLANNING
Received Choice form at 1436  Agency/Facility Name: [facility]  Referral sent per Choice form at 1443.

## 2018-10-09 NOTE — DISCHARGE PLANNING
Received Choice form at 1505  Agency/Facility Name: [facility]   Referral sent per Choice form at 1510.

## 2018-10-09 NOTE — FACE TO FACE
Face to Face Supporting Documentation - Home Health    The encounter with this patient was in whole or in part the primary reason for home health admission.    Date of encounter:   Patient:                    MRN:                       YOB: 2020  Kyra Funez  [id number]  1955     Home health to see patient for:  Skilled Nursing care for assessment, interventions & education    Skilled need for:  Exacerbation of Chronic Disease State COPD    Skilled nursing interventions to include:  Comment: medication management    Homebound status evidenced by:  Needs the assistance of another person in order to leave the home. Leaving home requires a considerable and taxing effort. There is a normal inability to leave the home.    Community Physician to provide follow up care: Demetri Britt M.D.     Optional Interventions? No      I certify the face to face encounter for this home health care referral meets the CMS requirements and the encounter/clinical assessment with the patient was, in whole, or in part, for the medical condition(s) listed above, which is the primary reason for home health care. Based on my clinical findings: the service(s) are medically necessary, support the need for home health care, and the homebound criteria are met.  I certify that this patient has had a face to face encounter by myself.  Jackie Galvez M.D. - NPI: [id number]

## 2018-10-09 NOTE — DISCHARGE PLANNING
Agency/Facility Name: [facility]  Spoke To: Nuzhat  Outcome: Patient accepted.    [facility]  Spoke To: Concha  Outcome: Order received and it currently being processed.

## 2018-10-09 NOTE — PROGRESS NOTES
[facility] Medicine Daily Progress Note    Date of Service  10/8/2018    Chief Complaint  63 y.o. female admitted 10/2/2018 with shortness of breath and cough.    Hospital Course    63 y.o. female with a past medical history of HLD, Vitamin D Deficiency, Sciatica and Glaucoma presented 10/2/2018 initially presented to the Urgent care for flue life symptoms. She was unfortunately found to have a pulse-ox of 76%. As a result she was directed to the ER. In the ER patient has been complaining of of shortness of breath, with onset  1 week ago, progressively worsening, with feeling of congestion, productive cough, subjective chills, fatigue. She also has right elbow swelling which she was treated for bursitis by her physician. At this point patient will be admitted for evaluation of shortness of breath, with active treatment utilizing antibiotics, IV steroids and breathing treatments. Patient denies having chest pain.      Interval Problem Update  Again dyspnea improving, slowly improving oxygen saturations with exertion. Rash on back persists and is itchy and severe.    Consultants/Specialty  Pulmonary  Code Status  Full code.    Disposition  TBD.    Review of Systems  Review of Systems   Constitutional: Negative.  Negative for chills, fever, malaise/fatigue and weight loss.   HENT: Positive for congestion. Negative for sinus pain.    Respiratory: Positive for cough, shortness of breath and wheezing. Negative for hemoptysis and sputum production.    Cardiovascular: Negative.  Negative for chest pain and leg swelling.   Gastrointestinal: Negative.  Negative for abdominal pain, nausea and vomiting.   Genitourinary: Negative.  Negative for dysuria and flank pain.   Musculoskeletal: Negative.  Negative for back pain and myalgias.   Skin: Positive for itching and rash.   Neurological: Negative.  Negative for dizziness, loss of consciousness and weakness.   Endo/Heme/Allergies: Negative.  Does not bruise/bleed easily.    Psychiatric/Behavioral: Negative.  Negative for depression. The patient is not nervous/anxious.    All other systems reviewed and are negative.       Physical Exam  Temp:  [36.4 °C (97.6 °F)-37 °C (98.6 °F)] 36.4 °C (97.6 °F)  Pulse:  [] 94  Resp:  [16-18] 18  BP: (115-133)/(66-73) 127/70    Physical Exam   Constitutional: She appears well-developed and well-nourished. No distress.   Patient seen and examined by me.   HENT:   Nose: Nose normal.   Mouth/Throat: Oropharynx is clear and moist. No oropharyngeal exudate.   Eyes: Conjunctivae are normal. Right eye exhibits no discharge. Left eye exhibits no discharge. No scleral icterus.   Neck: No JVD present. No tracheal deviation present.   Cardiovascular: Normal rate, regular rhythm and normal heart sounds.    Pulmonary/Chest: Effort normal and breath sounds normal. No stridor. No respiratory distress. She has no wheezes. She has no rales. She exhibits no tenderness.   Air movement improved no wheezing, still distant throughout.   Abdominal: Soft. Bowel sounds are normal. She exhibits no distension. There is no tenderness.   Musculoskeletal: She exhibits no edema or tenderness.   Clubbing of nails     Neurological: She is alert. No cranial nerve deficit. She exhibits normal muscle tone.   Skin: Skin is warm and dry. Rash noted. She is not diaphoretic. There is erythema. No pallor.   All over back now confluent in areas, extended to arms, face and legs.   Psychiatric: She has a normal mood and affect. Her behavior is normal.   Nursing note and vitals reviewed.      Fluids    Intake/Output Summary (Last 24 hours) at 10/08/18 1484  Last data filed at 10/08/18 1000   Gross per 24 hour   Intake             1080 ml   Output             1400 ml   Net             -320 ml       Laboratory  Recent Labs      10/08/18   0438   WBC  10.9*   RBC  5.13   HEMOGLOBIN  15.2   HEMATOCRIT  48.9*   MCV  95.3   MCH  29.6   MCHC  31.1*   RDW  48.0   PLATELETCT  221   MPV  9.7      Recent Labs      10/08/18   0438   SODIUM  138   POTASSIUM  4.0   CHLORIDE  100   CO2  34*   GLUCOSE  103*   BUN  14   CREATININE  0.79   CALCIUM  8.5                   Imaging  EC-ECHOCARDIOGRAM COMPLETE W/ CONT   Final Result      DX-CHEST-PORTABLE (1 VIEW)   Final Result      No acute cardiopulmonary findings.           Assessment/Plan  * Acute on chronic respiratory failure with hypoxemia (HCC)- (present on admission)   Assessment & Plan    COPD exacerbation, needs diagnostics after discharge, appreciate pulmonology input.  Smoking cessation            Contact dermatitis   Assessment & Plan    By appearance, severe off antibiotics, hydrocortisone cream bid, shower  Hypoallergenic sheets  D/c tele patient may shower  Daily cetirizine, benadryl prn        Polycythemia   Assessment & Plan    Due to severe copd, improved.  Treat hypoxia.        Acute exacerbation of chronic obstructive pulmonary disease (COPD) (HCC)- (present on admission)   Assessment & Plan    Wheezing and air movement starting to significantly improve hypoxia with exertion improving even better air movement, drops to mid 80s instead of 70s  Continue solumedrol high dose 125 mg every 6 hours, still not able to wean yet but getting closer  RT protocol  Smoking cessation discussed daily            Recurrent sinusitis- (present on admission)   Assessment & Plan    Continue azithromycin, ENT referral as outpatient.        Hyperlipidemia- (present on admission)   Assessment & Plan    Diet controlled        Retinopathy- (present on admission)   Assessment & Plan    Born with retinopathy left eye blind, stable        Glaucoma- (present on admission)   Assessment & Plan    Continue timolol eyedrops no acute issues             VTE prophylaxis: lovenox

## 2018-10-09 NOTE — PROGRESS NOTES
Telemetry summary: SR/ST. .12/.08/.38    Tolerated activity. Better today than yesterday. Down to 2.5L now. Generalized rash is not worse compared to yesterday. Benadryl and hydrocortisone cream helps as reported. No other acute problems noted.

## 2018-10-09 NOTE — RESPIRATORY CARE
Patient having desaturation on 2.5 lpm NC with exertion.  Saturations 86% when pt. Back to bed after restroom.

## 2018-10-09 NOTE — PROGRESS NOTES
[id number] - report from Manjeet PAREDES.  Pt resting comfortably in bed.  A&O x 4, very anxious.  Up to amb to br with sba for lines, steady on feet.  Fall precautions in effect.  Pt calls approp for asst oob.  Denies pain, sob, dizziness, nausea. See flowsheet for assess.  poc reviewed with pt, pt vu, all questions answered.  Tele = sr,  Vss.   Good po intake with am meal, [place] diet well.    1100 - up to amb in hallway for O2 sat check.  See flowsheet.    1800 - Pt d/c'd to home with family.  A&O x 4.  Up to amb, steady on feet.  Iv d/c'd intact.  dx info/handouts given.  Pt vu of all d/c teaching, all questions answered.  Pt escorted to private car via wc & staff.

## 2018-10-09 NOTE — CARE PLAN
Problem: Safety  Goal: Will remain free from falls  Outcome: PROGRESSING AS EXPECTED  See risk assessment.  Pt up with asst for lines, encouraged to call for asst oob as needed.    Problem: Knowledge Deficit  Goal: Knowledge of disease process/condition, treatment plan, diagnostic tests, and medications will improve  Outcome: PROGRESSING SLOWER THAN EXPECTED  See education.  Poc to be reviewed with pt daily.  Pt refuses copd education t/o day, Pt encouraged to call with any questions/concerns.

## 2018-10-09 NOTE — DISCHARGE PLANNING
Anticipated Discharge Disposition: Home with Home Health.    Action: CHRIS met with pt at bedside regarding HH and O2.  Pt [facility]      Barriers to Discharge: Pending HH acceptance and O2 to be delivered.     Plan: Pt will dc home with HH and O2.

## 2018-10-09 NOTE — FLOWSHEET NOTE
10/08/18 2015   Events/Summary/Plan   Events/Summary/Plan SVN MDI FLUTTER   Interdisciplinary Plan of Care-Goals (Indications)   Bronchodilator Indications History / Diagnosis   Bronchopulmonary Hygiene Indications Difficulty with Secretion Clearance   Interdisciplinary Plan of Care-Outcomes    Bronchodilator Outcome Patient at Stable Baseline   Bronchopulmonary Hygiene Outcome Patient at Stable Baseline   Education   Education Yes - Pt. / Family has been Instructed in use of Respiratory Equipment;Yes - Pt. / Family has been Instructed in use of Respiratory Medications and Adverse Reactions   RT Assessment of Delivered Medications   Evaluation of Medication Delivery Daily Yes-- Pt /Family has been Instructed in use of Respiratory Medications and Adverse Reactions   SVN Group   #SVN Performed Yes   Given By: Mouthpiece   MDI/DPI Group   #MDI/DPI Given MDI/DPI x 1   PEP/CPT Group   PEP/CPT/Airway Clearance Therapy Yes   #PEP/CPT (Manual) Subsequent Subsequent   PEP/CPT Method Flutter Valve   Respiratory WDL   Respiratory (WDL) X   Chest Exam   Respiration 18   Pulse 92   Breath Sounds   Pre/Post Intervention Post Intervention Assessment   RUL Breath Sounds Clear   RML Breath Sounds Diminished   RLL Breath Sounds Diminished   GEN Breath Sounds Clear   LLL Breath Sounds Diminished   Oximetry   #Pulse Oximetry (Single Determination) Yes   Oxygen   Pulse Oximetry 93 %   O2 (LPM) 2.5   O2 Daily Delivery Respiratory  Silicone Nasal Cannula

## 2018-10-09 NOTE — FLOWSHEET NOTE
10/09/18 1413   Interdisciplinary Plan of Care-Goals (Indications)   Bronchodilator Indications History / Diagnosis   Interdisciplinary Plan of Care-Outcomes    Bronchodilator Outcome Patient at Stable Baseline   Bronchopulmonary Hygiene Outcome Patient at Stable Baseline   Education   Education Yes - Pt. / Family has been Instructed in use of Respiratory Equipment;Yes - Pt. / Family has been Instructed in use of Respiratory Medications and Adverse Reactions   RT Assessment of Delivered Medications   Evaluation of Medication Delivery Daily Yes-- Pt /Family has been Instructed in use of Respiratory Medications and Adverse Reactions   SVN Group   #SVN Performed Yes   Given By: Mouthpiece   PEP/CPT Group   PEP/CPT/Airway Clearance Therapy Yes   #PEP/CPT (Manual) Subsequent Subsequent   PEP/CPT Method Flutter Valve   Respiratory WDL   Respiratory (WDL) X   Chest Exam   Work Of Breathing / Effort Mild   Respiration 17   Pulse 88   Breath Sounds   Pre/Post Intervention Pre Intervention Assessment   RUL Breath Sounds Diminished   RML Breath Sounds Diminished   RLL Breath Sounds Diminished   GEN Breath Sounds Diminished   LLL Breath Sounds Diminished   Secretions   Cough Moist;Non Productive   How Sputum Obtained Spontaneous   Oximetry   #Pulse Oximetry (Single Determination) Yes   Oxygen   Pulse Oximetry 92 %   O2 (LPM) 2.5   O2 Daily Delivery Respiratory  Silicone Nasal Cannula

## 2018-10-10 ENCOUNTER — TELEPHONE (OUTPATIENT)
Dept: MEDICAL GROUP | Age: 63
End: 2018-10-10

## 2018-10-10 DIAGNOSIS — R79.81 LOW OXYGEN SATURATION: ICD-10-CM

## 2018-10-10 NOTE — RESPIRATORY CARE
"COPD EDUCATION by COPD CLINICAL EDUCATOR  10/9/2018 at 3:55 PM by Keily Elliott     Patient seen by Respiratory Education team to complete Block 1 of a 2 part series. Reference material about the program was given to patient along with our contact information.  Part #1 includes: What is COPD (how the lungs work), common treatments for COPD, the difference between \"rescue\" medications and \"daily\" medications, bronchial hygiene, and explanation of the Action Plan. We discussed appropriate medication technique and the correct way to care for and clean equipment. Advance directives and smoking cessation were discussed as appropriate to this patient. Question and answer session followed.     Patient being discharged so the final block of education session discussed signs and symptoms of an exacerbation (flare-up), triggers that can create flare-ups, reiteration of the \"Action Plan\" to refer to daily which will help categorize their symptoms in order to utilize the appropriate therapy, breathing techniques used to treat acute symptoms, and oxygen safety. Smoking Cessation was discussed as appropriate to this patient. Question and answer session followed.    Patient will be going home with Rx for Symbicort, Spiriva and Pro Aire, along with home O2. Provided spacer with instruction for use, care, and cleaning. Informed pt that when her PCP will need to send a referral in if she wants to go to [facility].  "

## 2018-10-10 NOTE — DISCHARGE INSTRUCTIONS
Chronic Obstructive Pulmonary Disease  Chronic obstructive pulmonary disease (COPD) is a common lung condition in which airflow from the lungs is limited. COPD is a general term that can be used to describe many different lung problems that limit airflow, including both chronic bronchitis and emphysema. If you have COPD, your lung function will probably never return to normal, but there are measures you can take to improve lung function and make yourself feel better.  What are the causes?  · Smoking (common).  · Exposure to secondhand smoke.  · Genetic problems.  · Chronic inflammatory lung diseases or recurrent infections.  What are the signs or symptoms?  · Shortness of breath, especially with physical activity.  · Deep, persistent (chronic) cough with a large amount of thick mucus.  · Wheezing.  · Rapid breaths (tachypnea).  · Gray or bluish discoloration (cyanosis) of the skin, especially in your fingers, toes, or lips.  · Fatigue.  · Weight loss.  · Frequent infections or episodes when breathing symptoms become much worse (exacerbations).  · Chest tightness.  How is this diagnosed?  Your health care provider will take a medical history and perform a physical examination to diagnose COPD. Additional tests for COPD may include:  · Lung (pulmonary) function tests.  · Chest X-ray.  · CT scan.  · Blood tests.  How is this treated?  Treatment for COPD may include:  · Inhaler and nebulizer medicines. These help manage the symptoms of COPD and make your breathing more comfortable.  · Supplemental oxygen. Supplemental oxygen is only helpful if you have a low oxygen level in your blood.  · Exercise and physical activity. These are beneficial for nearly all people with COPD.  · Lung surgery or transplant.  · Nutrition therapy to gain weight, if you are underweight.  · Pulmonary rehabilitation. This may involve working with a team of health care providers and specialists, such as respiratory, occupational, and physical  therapists.  Follow these instructions at home:  · Take all medicines (inhaled or pills) as directed by your health care provider.  · Avoid over-the-counter medicines or cough syrups that dry up your airway (such as antihistamines) and slow down the elimination of secretions unless instructed otherwise by your health care provider.  · If you are a smoker, the most important thing that you can do is stop smoking. Continuing to smoke will cause further lung damage and breathing trouble. Ask your health care provider for help with quitting smoking. He or she can direct you to community resources or hospitals that provide support.  · Avoid exposure to irritants such as smoke, chemicals, and fumes that aggravate your breathing.  · Use oxygen therapy and pulmonary rehabilitation if directed by your health care provider. If you require home oxygen therapy, ask your health care provider whether you should purchase a pulse oximeter to measure your oxygen level at home.  · Avoid contact with individuals who have a contagious illness.  · Avoid extreme temperature and humidity changes.  · Eat healthy foods. Eating smaller, more frequent meals and resting before meals may help you maintain your strength.  · Stay active, but balance activity with periods of rest. Exercise and physical activity will help you maintain your ability to do things you want to do.  · Preventing infection and hospitalization is very important when you have COPD. Make sure to receive all the vaccines your health care provider recommends, especially the pneumococcal and influenza vaccines. Ask your health care provider whether you need a pneumonia vaccine.  · Learn and use relaxation techniques to manage stress.  · Learn and use controlled breathing techniques as directed by your health care provider. Controlled breathing techniques include:  1. Pursed lip breathing. Start by breathing in (inhaling) through your nose for 1 second. Then, purse your lips as  if you were going to whistle and breathe out (exhale) through the pursed lips for 2 seconds.  2. Diaphragmatic breathing. Start by putting one hand on your abdomen just above your waist. Inhale slowly through your nose. The hand on your abdomen should move out. Then purse your lips and exhale slowly. You should be able to feel the hand on your abdomen moving in as you exhale.  · Learn and use controlled coughing to clear mucus from your lungs. Controlled coughing is a series of short, progressive coughs. The steps of controlled coughing are:  1. Lean your head slightly forward.  2. Breathe in deeply using diaphragmatic breathing.  3. Try to hold your breath for 3 seconds.  4. Keep your mouth slightly open while coughing twice.  5. Spit any mucus out into a tissue.  6. Rest and repeat the steps once or twice as needed.  Contact a health care provider if:  · You are coughing up more mucus than usual.  · There is a change in the color or thickness of your mucus.  · Your breathing is more labored than usual.  · Your breathing is faster than usual.  Get help right away if:  · You have shortness of breath while you are resting.  · You have shortness of breath that prevents you from:  ¨ Being able to talk.  ¨ Performing your usual physical activities.  · You have chest pain lasting longer than 5 minutes.  · Your skin color is more cyanotic than usual.  · You measure low oxygen saturations for longer than 5 minutes with a pulse oximeter.  This information is not intended to replace advice given to you by your health care provider. Make sure you discuss any questions you have with your health care provider.  Document Released: 09/27/2006 Document Revised: 05/25/2017 Document Reviewed: 08/14/2014  Elsevier Interactive Patient Education © 2017 [employer]    Discharge Instructions    Discharged to home by car with relative. Discharged via wheelchair, hospital escort: Yes.  Special equipment needed: Not Applicable    Be sure  to schedule a follow-up appointment with your primary care doctor or any specialists as instructed.     Discharge Plan:   Diet Plan: Discussed  Activity Level: Discussed  Smoking Cessation Offered: Patient Counseled  Confirmed Follow up Appointment: Patient to Call and Schedule Appointment  Confirmed Symptoms Management: Discussed  Medication Reconciliation Updated: Yes  Pneumococcal Vaccine Administered/Refused: Not given - Patient refused pneumococcal vaccine (please give at time of discharge)  Influenza Vaccine Indication: Indicated: 65 years and older  Influenza Vaccine Given - only chart on this line when given: Influenza Vaccine Given (See MAR)    I understand that a diet low in cholesterol, fat, and sodium is recommended for good health. Unless I have been given specific instructions below for another diet, I accept this instruction as my diet prescription.     Special Instructions:     Please follow-up with your primary care MD.  Please resume your previous activity levels gradually and only as you tolerate.  Please resume your previous heart healthy diet as you tolerate.  Please report any increased shortness of breath, increased cough or sputum production, chest pain, fever, to your MD immediately or return to ER.    · Is patient discharged on Warfarin / Coumadin?   No     Depression / Suicide Risk    As you are discharged from this [facility] facility, it is important to learn how to keep safe from harming yourself.    Recognize the warning signs:  · Abrupt changes in personality, positive or negative- including increase in energy   · Giving away possessions  · Change in eating patterns- significant weight changes-  positive or negative  · Change in sleeping patterns- unable to sleep or sleeping all the time   · Unwillingness or inability to communicate  · Depression  · Unusual sadness, discouragement and loneliness  · Talk of wanting to die  · Neglect of personal appearance   · Rebelliousness-  reckless behavior  · Withdrawal from people/activities they love  · Confusion- inability to concentrate     If you or a loved one observes any of these behaviors or has concerns about self-harm, here's what you can do:  · Talk about it- your feelings and reasons for harming yourself  · Remove any means that you might use to hurt yourself (examples: pills, rope, extension cords, firearm)  · Get professional help from the community (Mental Health, Substance Abuse, psychological counseling)  · Do not be alone:Call your Safe Contact- someone whom you trust who will be there for you.  · Call your local CRISIS HOTLINE 415-6890 or 270-325-9236  · Call your local Children's Mobile Crisis Response Team [place] (341) 570-6669 or www.PopUp  · Call the toll free National Suicide Prevention Hotlines   · National Suicide Prevention Lifeline 993-438-JFXG (4001)  · National Hope Line Network 800-SUICIDE (462-8472)

## 2018-10-10 NOTE — DISCHARGE SUMMARY
Discharge Summary    CHIEF COMPLAINT ON ADMISSION  Chief Complaint   Patient presents with   • Shortness of Breath     x1 week.  72% RA.  Congested.     • Sent from Urgent Care       Reason for Admission  Shortness of Breath, Chest Pains, *     Admission Date  10/2/2018    CODE STATUS  Full Code    HPI & HOSPITAL COURSE  This is a 63 y.o. female here with congestion, cough, SOB and found with hypoxia at urgent care. She is an active tobacco user. She was admitted for COPD exacerbation and possible PNA. Labs showed polycythemia. CXR showed no infiltrates. TTE was normal. Flu was negative. Her procalcitonin was normal so she was only continued on azithromycin for COPD. Dr. Marion with pulmonology was consulted for her COPD, but she was not accepting of her diagnosis. She was attributing her hypoxia to her sinusitis and rash. Her mother recently  from COPD.   She developed a rash to her back, arms, and legs. Thought to be contact dermatitis and treated with benedryl and topical steroids.  She was slow to improve with her COPD and stayed dependent on O2. Patient was initially reluctant to be discharged with home O2 but finally agreed. I discussed continuing her inhalers on discharge, and she is agreeable.       Therefore, she is discharged in good and stable condition to home with organized home healthcare and close outpatient follow-up.    The patient met 2-midnight criteria for an inpatient stay at the time of discharge.    Discharge Date  10/9/18    FOLLOW UP ITEMS POST DISCHARGE  Consider pulm referral if patient agrees  Tobacco cessation    DISCHARGE DIAGNOSES  Principal Problem:    Acute on chronic respiratory failure with hypoxemia (HCC) POA: Yes  Active Problems:    Glaucoma POA: Yes    Retinopathy POA: Yes    Hyperlipidemia POA: Yes    Recurrent sinusitis POA: Yes    Acute exacerbation of chronic obstructive pulmonary disease (COPD) (HCC) POA: Yes    Polycythemia POA: Yes    Contact dermatitis POA:  "No  Resolved Problems:    * No resolved hospital problems. *      FOLLOW UP  [facility]  [address] [place] [phone]          MEDICATIONS ON DISCHARGE     Medication List      START taking these medications      Instructions   albuterol 108 (90 Base) MCG/ACT Aers inhalation aerosol   Inhale 2 Puffs by mouth every 6 hours as needed for Shortness of Breath.  Dose:  2 Puff     budesonide-formoterol 80-4.5 MCG/ACT Aero  Commonly known as:  SYMBICORT   Inhale 2 Puffs by mouth 2 Times a Day.  Dose:  2 Puff     diphenhydrAMINE 25 MG Tabs  Commonly known as:  BENADRYL   Take 1 tablet by mouth every 6 hours as needed for Itching for up to 7 days.  Dose:  25 mg     hydrocortisone 2.5 % Crea topical cream   Apply 1 Inch to affected area(s) 2 Times a Day. Do not apply to face, underarms, or groin  Dose:  1 Inch     nicotine 21 MG/24HR Pt24  Commonly known as:  NICODERM   Apply 1 Patch to skin as directed every 24 hours for 7 days.  Dose:  1 Patch     tiotropium 18 MCG Caps  Commonly known as:  SPIRIVA   Inhale 1 Cap by mouth every day.  Dose:  18 mcg        CONTINUE taking these medications      Instructions   cetirizine 10 MG Tabs  Commonly known as:  ZYRTEC   Take 1 Tab by mouth every day.  Dose:  10 mg     meloxicam 7.5 MG Tabs  Commonly known as:  MOBIC   Take 7.5 mg by mouth every day.  Dose:  7.5 mg     timolol 0.5 % ophthalmic solution  Commonly known as:  BETIMOL   Place 1 Drop in both eyes 2 times a day. use in affect  Dose:  1 Drop     Vitamin D 2000 units Caps   Take 1 Cap by mouth every day.  Dose:  1 Cap        STOP taking these medications    amoxicillin 500 MG Caps  Commonly known as:  AMOXIL            Allergies  Allergies   Allergen Reactions   • Iodine Shortness of Breath   • Latex      \"was told from her dentist, not sure reaction\"   Paper tape okay   • Maxepa Hives   • Niacin Shortness of Breath   • Shellfish Allergy    • Tomato        DIET  Orders Placed This Encounter "   Procedures   • Diet Order Regular     Standing Status:   Standing     Number of Occurrences:   1     Order Specific Question:   Diet:     Answer:   Regular [1]       ACTIVITY  As tolerated.  Weight bearing as tolerated    CONSULTATIONS  Pulm    PROCEDURES  EC-ECHOCARDIOGRAM COMPLETE W/ CONT   Final Result      DX-CHEST-PORTABLE (1 VIEW)   Final Result      No acute cardiopulmonary findings.            LABORATORY  Lab Results   Component Value Date    SODIUM 138 10/08/2018    POTASSIUM 4.0 10/08/2018    CHLORIDE 100 10/08/2018    CO2 34 (H) 10/08/2018    GLUCOSE 103 (H) 10/08/2018    BUN 14 10/08/2018    CREATININE 0.79 10/08/2018    CREATININE 1.03 (H) 03/05/2010    GLOMRATE 55 (L) 03/05/2010        Lab Results   Component Value Date    WBC 10.9 (H) 10/08/2018    WBC 7.8 03/05/2010    HEMOGLOBIN 15.2 10/08/2018    HEMATOCRIT 48.9 (H) 10/08/2018    PLATELETCT 221 10/08/2018        Total time of the discharge process exceeds 35 minutes.

## 2018-10-10 NOTE — TELEPHONE ENCOUNTER
VOICEMAIL  1. Caller Name: Kyra Funez                        Call Back Number: [phone] (home) [phone] (work)      2. Message: Pt was recently in the hospital for a COPD exacerbation.  Pt has a severe rash all over her body that she acquired while at the hospital.  Pt needs to see her PCP before Tuesday 10/16/18 for a doctors note for work, as the hospital could not provide her one.   Ok to double book?    Pt is also in need of a referral to Pulmonology.    3. Patient approves office to leave a detailed voicemail/MyChart message: N\A      2. SPECIFIC Action To Be Taken: Referral pending, please sign.    3. Diagnosis/Clinical Reason for Request: Low oxygen    4. Specialty & Provider Name/Lab/Imaging Location: pulmonology    5. Is appointment scheduled for requested order/referral: no    Patient was not informed they will receive a return phone call from the office ONLY if there are any questions before processing their request. Advised to call back if they haven't received a call from the referral department in 5 days.

## 2018-10-11 ENCOUNTER — TELEPHONE (OUTPATIENT)
Dept: MEDICAL GROUP | Age: 63
End: 2018-10-11

## 2018-10-11 NOTE — TELEPHONE ENCOUNTER
Phone Number Called: [phone] (home)   Message: LVM for Pt to call back and schedule an appointment with PCP    Left Message for patient to call back: yes

## 2018-10-11 NOTE — TELEPHONE ENCOUNTER
VOICEMAIL  1. Caller Name: Kyra Funez                        Call Back Number: [phone] (home) [phone] (work)      2. Message: Pt is following up with a different provider, but Pt would still like to see Dr. Addison soon.     3. Patient approves office to leave a detailed voicemail/MyChart message: yes

## 2018-10-12 NOTE — TELEPHONE ENCOUNTER
VOICEMAIL  1. Caller Name: Home Health                      Call Back Number: [phone]    2. Message: [facility] referrals for Physical Therapy and a     3. Patient approves office to leave a detailed voicemail/MyChart message: N\A

## 2018-10-15 ENCOUNTER — OFFICE VISIT (OUTPATIENT)
Dept: MEDICAL GROUP | Facility: MEDICAL CENTER | Age: 63
End: 2018-10-15
Payer: COMMERCIAL

## 2018-10-15 VITALS
BODY MASS INDEX: 31.07 KG/M2 | TEMPERATURE: 98.6 F | SYSTOLIC BLOOD PRESSURE: 126 MMHG | DIASTOLIC BLOOD PRESSURE: 70 MMHG | HEIGHT: 64 IN | OXYGEN SATURATION: 95 % | WEIGHT: 182 LBS | RESPIRATION RATE: 16 BRPM | HEART RATE: 87 BPM

## 2018-10-15 DIAGNOSIS — J96.21 ACUTE ON CHRONIC RESPIRATORY FAILURE WITH HYPOXEMIA (HCC): ICD-10-CM

## 2018-10-15 DIAGNOSIS — J44.1 ACUTE EXACERBATION OF CHRONIC OBSTRUCTIVE PULMONARY DISEASE (COPD) (HCC): ICD-10-CM

## 2018-10-15 DIAGNOSIS — L24.9 IRRITANT CONTACT DERMATITIS, UNSPECIFIED TRIGGER: ICD-10-CM

## 2018-10-15 DIAGNOSIS — F17.210 SMOKING GREATER THAN 30 PACK YEARS: ICD-10-CM

## 2018-10-15 DIAGNOSIS — Z23 NEED FOR VACCINATION: ICD-10-CM

## 2018-10-15 DIAGNOSIS — Z99.81 SUPPLEMENTAL OXYGEN DEPENDENT: ICD-10-CM

## 2018-10-15 PROCEDURE — 90471 IMMUNIZATION ADMIN: CPT | Performed by: NURSE PRACTITIONER

## 2018-10-15 PROCEDURE — 90670 PCV13 VACCINE IM: CPT | Performed by: NURSE PRACTITIONER

## 2018-10-15 PROCEDURE — 99214 OFFICE O/P EST MOD 30 MIN: CPT | Performed by: NURSE PRACTITIONER

## 2018-10-15 NOTE — PROGRESS NOTES
Subjective:     Chief Complaint   Patient presents with   • Follow-Up     HOSPITAL FOLLOW UP//OXYGEN   • Rash     ALL OVER BODY     Kyra Funez is a 63 y.o. female established patient of Dr. Addison here for hospital follow-up.  We discussed:    Contact dermatitis  Starting during recent hospitalization. Pt feels she is allergic to saline or sodium-containing medications and attributes rash to either this or latex exposure  Has been slowing improving since discharge  Taking benedryl occasionally at this point. Stopped zyrtec.  No discharge, pain    Acute on chronic respiratory failure with hypoxemia (HCC)  Recently hospitalized with hypoxemia requiring start of supplemental oxygen.  Notes reviewed, questionable COPD exacerbation versus sinusitis.  She was treated with IV antibiotics and gradually improved, discharged on October 9.  She required oxygen on discharge and has continued with this at home.  She is monitoring O2 saturation with readings in the high 90s at rest, 90-92 with activity.  Currently on 4 L nasal cannula.  She has been out of work since the time of her hospital admission 10-2- 18  She is receiving home health and physical therapy services  She continues with Symbicort, Spiriva, Ventolin as needed  Denies any increasing cough, shortness of breath.  No fever, malaise, nausea, chest pain    Smoking greater than 30 pack years  She has been using nicotine patch since discharge from hospital        Current medicines (including changes today)  Current Outpatient Prescriptions   Medication Sig Dispense Refill   • budesonide-formoterol (SYMBICORT) 80-4.5 MCG/ACT Aerosol Inhale 2 Puffs by mouth 2 Times a Day. 1 Inhaler 3   • diphenhydrAMINE (BENADRYL) 25 MG Tab Take 1 tablet by mouth every 6 hours as needed for Itching for up to 7 days. 30 Tab 0   • nicotine (NICODERM) 21 MG/24HR PATCH 24 HR Apply 1 Patch to skin as directed every 24 hours for 7 days. 7 Patch 0   • albuterol 108 (90 Base) MCG/ACT Aero  "Soln inhalation aerosol Inhale 2 Puffs by mouth every 6 hours as needed for Shortness of Breath. 8.5 g 1   • tiotropium (SPIRIVA) 18 MCG Cap Inhale 1 Cap by mouth every day. 30 Cap 3   • Cholecalciferol (VITAMIN D) 2000 units Cap Take 1 Cap by mouth every day.     • meloxicam (MOBIC) 7.5 MG Tab Take 7.5 mg by mouth every day.     • cetirizine (ZYRTEC) 10 MG Tab Take 1 Tab by mouth every day. 90 Tab 1   • timolol (BETIMOL) 0.5 % ophthalmic solution Place 1 Drop in both eyes 2 times a day. use in affect     • hydrocortisone 2.5 % Cream topical cream Apply 1 Inch to affected area(s) 2 Times a Day. Do not apply to face, underarms, or groin 1 Tube 0     No current facility-administered medications for this visit.      She  has a past medical history of Colitis; Glaucoma (2/25/2010); Iritis; Migraine; Neck pain; and Retinopathy. She also has no past medical history of ASTHMA; CAD (coronary artery disease); Cancer ([facility]); Congestive heart failure ([facility]); Diabetes; Hypertension; Infectious disease; Psychiatric disorder; Renal disorder; or Seizure disorder ([facility]).    ROS included above     Objective:     Blood pressure 126/70, pulse 87, temperature 37 °C (98.6 °F), temperature source Temporal, resp. rate 16, height 1.626 m (5' 4\"), weight 82.6 kg (182 lb), SpO2 95 %, not currently breastfeeding. Body mass index is 31.24 kg/m².     Physical Exam:  General: Alert, oriented in no acute distress.  Eye contact is good, speech is normal, affect calm  Lungs: clear to auscultation bilaterally, normal effort, no wheeze/ rhonchi/ rales.  CV: regular rate and rhythm, S1, S2  Abdomen: soft, nontender  Ext: 1+ bilateral pedal edema, mildly erythematous rash on bilateral arms, legs, scattered areas of trunk. No vesicles, no crusting or drainage present    Assessment and Plan:   The following treatment plan was discussed   1. Acute exacerbation of chronic obstructive pulmonary disease (COPD) ([facility])   recent hospital notes reviewed.  Inpatient " 10/2-10/9.  Slowly improving now with home health.  She has, however, on ready to return to work.  I have completed [facility] paperwork to allow for 4-week absence, expected return date of 10/31.  Patient will need follow-up for any extension  REFERRAL TO PULMONARY REHAB   2. Acute on chronic respiratory failure with hypoxemia (HCC)     3. Supplemental oxygen dependent   currently on 4 L nasal cannula.  She has monitoring oxygen saturation at home.  May try decreased to 3 L, then 2 if tolerating.  Advised follow-up with PCP within the next 2 weeks  REFERRAL TO PULMONARY REHAB   4. Irritant contact dermatitis, unspecified trigger   diffuse erythematous rash, slowly improving.  May continue with Zyrtec and Benadryl.  Stop topical cream as she felt this was worsening symptoms   5. Need for vaccination  I have placed the below orders and discussed them with an approved delegating provider. The MA is performing the below orders under the direction of Dr. Lopez  Pneumococcal Conjugate Vaccine 13-Valent   6. Smoking greater than 30 pack years   using nicotine replacement patch.  Encouraged to continue with complete avoidance       Followup: 2 weeks with PCP         Please note that this dictation was created using voice recognition software. I have worked with consultants from the vendor as well as technical experts from [employer] to optimize the interface. I have made every reasonable attempt to correct obvious errors, but I expect that there are errors of grammar and possibly content that I did not discover before finalizing the note.

## 2018-10-15 NOTE — ASSESSMENT & PLAN NOTE
Recently hospitalized with hypoxemia requiring start of supplemental oxygen.  Notes reviewed, questionable COPD exacerbation versus sinusitis.  She was treated with IV antibiotics and gradually improved, discharged on October 9.  She required oxygen on discharge and has continued with this at home.  She is monitoring O2 saturation with readings in the high 90s at rest, 90-92 with activity.  Currently on 4 L nasal cannula.  She has been out of work since the time of her hospital admission 10-2- 18  She is receiving home health and physical therapy services  She continues with Symbicort, Spiriva, Ventolin as needed  Denies any increasing cough, shortness of breath.  No fever, malaise, nausea, chest pain

## 2018-10-15 NOTE — ASSESSMENT & PLAN NOTE
Starting during recent hospitalization. Pt feels she is allergic to saline and attributes rash to either this or latex  Rash is slowly improving  Taking benedryl at this point. Stopped zyrtec after concerns with taking too much antihistamine

## 2018-10-15 NOTE — ASSESSMENT & PLAN NOTE
Recent hospital visit with ? COPD exacerbation vs sinusitis exacerbating sob  Required 02 during hosp and discharged home on oxygen 4 L continuous. She has been logging 02 at home with readings in the high 90's with rest. She does dip to 90% with walking (reportedly 6 minutes).

## 2018-10-16 ENCOUNTER — TELEPHONE (OUTPATIENT)
Dept: MEDICAL GROUP | Age: 63
End: 2018-10-16

## 2018-10-16 NOTE — TELEPHONE ENCOUNTER
VOICEMAIL  1. Caller Name: Dr[facility]                       Call Back Number: [phone]    2. Message: Pt is doing physical therapy 2 times a week for 4 weeks after being discharged from the hospital.  Pt would like know if she can begin dosing down her oxygen, as she was not on continuous oxygen prior to her hospital visit.  Pt's O2 is stable at room air at rest, but drops down to 92% with activity.     3. Patient approves office to leave a detailed voicemail/MyChart message: N\A    Pt has been evaluated in clinic by Megan Liu on 10/15/18, and has an appointment with PCP on 11/1/18

## 2018-10-17 NOTE — TELEPHONE ENCOUNTER
VOICEMAIL  1. Caller Name: Kyra Funez                        Call Back Number: [phone] (home) [phone] (work)      2. Message: Pt's FMLA paperwork has been filled out until 10/30/2018 by Megan Liu at appointment on 10/15/18.  Pt would like to be seen earlier so she does not have to miss work for appointment.    Ok to double book?    3. Patient approves office to leave a detailed voicemail/MyChart message: yes

## 2018-10-18 NOTE — TELEPHONE ENCOUNTER
Phone Number Called: [phone]    Message: LVM for Pt to schedule a sooner appointment with PCP.  Ok to double book per Dr. Addison.     Left Message for patient to call back: yes

## 2018-10-22 ENCOUNTER — TELEPHONE (OUTPATIENT)
Dept: MEDICAL GROUP | Age: 63
End: 2018-10-22

## 2018-10-23 NOTE — TELEPHONE ENCOUNTER
· Home Health paperwork received from [facility]   requiring provider signature.     · All appropriate fields completed by Medical Assistant: Yes    Paperwork handed to provider to sign then scanned to patient's chart and faxed to [phone]

## 2018-10-23 NOTE — TELEPHONE ENCOUNTER
"· Home Health paperwork received from [facility] requiring provider signature.     · All appropriate fields completed by Medical Assistant: Yes    · Paperwork placed in \"MA to Provider\" folder/basket. Awaiting provider completion/signature.    "

## 2018-10-25 ENCOUNTER — OFFICE VISIT (OUTPATIENT)
Dept: MEDICAL GROUP | Age: 63
End: 2018-10-25
Payer: COMMERCIAL

## 2018-10-25 VITALS
BODY MASS INDEX: 30.9 KG/M2 | DIASTOLIC BLOOD PRESSURE: 80 MMHG | HEART RATE: 93 BPM | HEIGHT: 64 IN | OXYGEN SATURATION: 93 % | SYSTOLIC BLOOD PRESSURE: 128 MMHG | WEIGHT: 181 LBS | TEMPERATURE: 98.3 F

## 2018-10-25 DIAGNOSIS — L82.0 INFLAMED SEBORRHEIC KERATOSIS: ICD-10-CM

## 2018-10-25 DIAGNOSIS — L57.0 AK (ACTINIC KERATOSIS): ICD-10-CM

## 2018-10-25 DIAGNOSIS — J96.21 ACUTE ON CHRONIC RESPIRATORY FAILURE WITH HYPOXEMIA (HCC): ICD-10-CM

## 2018-10-25 PROCEDURE — 17000 DESTRUCT PREMALG LESION: CPT | Mod: 59 | Performed by: FAMILY MEDICINE

## 2018-10-25 PROCEDURE — 99214 OFFICE O/P EST MOD 30 MIN: CPT | Mod: 25 | Performed by: FAMILY MEDICINE

## 2018-10-25 PROCEDURE — 17003 DESTRUCT PREMALG LES 2-14: CPT | Performed by: FAMILY MEDICINE

## 2018-10-25 PROCEDURE — 17110 DESTRUCTION B9 LES UP TO 14: CPT | Performed by: FAMILY MEDICINE

## 2018-10-25 RX ORDER — PREDNISONE 20 MG/1
TABLET ORAL
Qty: 12 TAB | Refills: 0 | Status: SHIPPED
Start: 2018-10-25 | End: 2020-02-25

## 2018-10-25 RX ORDER — AMOXICILLIN AND CLAVULANATE POTASSIUM 875; 125 MG/1; MG/1
1 TABLET, FILM COATED ORAL 2 TIMES DAILY
Qty: 14 TAB | Refills: 0 | Status: SHIPPED | OUTPATIENT
Start: 2018-10-25 | End: 2018-11-13

## 2018-10-25 NOTE — ASSESSMENT & PLAN NOTE
Patient states she has had good results in the past with a liquid nitrogen.  She has several more raised flaky plaques which are very itchy often bleed and scab up when she scratches them.

## 2018-10-25 NOTE — PROGRESS NOTES
This medical record contains text that has been entered with the assistance of computer voice recognition and dictation software.  Therefore, it may contain unintended errors in text, spelling, punctuation, or grammar    Chief Complaint   Patient presents with   • Hospital Follow-up   • Oxygen Dependency         Kyra Funez is a 63 y.o. female here evaluation and management of: hospital follow up      HPI:     Inflamed seborrheic keratosis  Patient states she has had good results in the past with a liquid nitrogen.  She has several more raised flaky plaques which are very itchy often bleed and scab up when she scratches them.    Acute on chronic respiratory failure with hypoxemia (HCC)  The patient is a 63-year-old female who presents to clinic for [place] paperwork.  She was admitted for COPD exacerbation/respiratory failure on 2 October 2018.  She was discharged on 9 October.  She was given IV fluids IV Unasyn and azithromycin as well as continuous oxygen therapy 4 L via nasal cannula.  Overall her oxygen requirement has decreased currently on 2-1/2 L via nasal cannula.  She is been working with physical therapy as well as home health aide at home.  She did receive her Pneumovax vaccine and flu vaccine in the hospital.  She states that she still has difficulty breathing on exertion and she is not able to return to work for at least a couple more weeks.  She denies any chest pain no new shortness of breath just some underlying shortness of breath from her COPD.  Denies any headache no abdominal pain no fevers no chills.    Current medicines (including changes today)  Current Outpatient Prescriptions   Medication Sig Dispense Refill   • amoxicillin-clavulanate (AUGMENTIN) 875-125 MG Tab Take 1 Tab by mouth 2 times a day. 14 Tab 0   • predniSONE (DELTASONE) 20 MG Tab Take 3 tabs po for 2 days then 2 tabs for 2 days then 1 for 2 days 12 Tab 0   • albuterol 108 (90 Base) MCG/ACT Aero Soln inhalation aerosol Inhale 2  Puffs by mouth every 6 hours as needed for Shortness of Breath. 8.5 g 1   • budesonide-formoterol (SYMBICORT) 80-4.5 MCG/ACT Aerosol Inhale 2 Puffs by mouth 2 Times a Day. 1 Inhaler 3   • hydrocortisone 2.5 % Cream topical cream Apply 1 Inch to affected area(s) 2 Times a Day. Do not apply to face, underarms, or groin 1 Tube 0   • tiotropium (SPIRIVA) 18 MCG Cap Inhale 1 Cap by mouth every day. 30 Cap 3   • Cholecalciferol (VITAMIN D) 2000 units Cap Take 1 Cap by mouth every day.     • meloxicam (MOBIC) 7.5 MG Tab Take 7.5 mg by mouth every day.     • cetirizine (ZYRTEC) 10 MG Tab Take 1 Tab by mouth every day. 90 Tab 1   • timolol (BETIMOL) 0.5 % ophthalmic solution Place 1 Drop in both eyes 2 times a day. use in affect       No current facility-administered medications for this visit.      She  has a past medical history of Colitis; Glaucoma (2010); Iritis; Migraine; Neck pain; and Retinopathy. She also has no past medical history of ASTHMA; CAD (coronary artery disease); Cancer (HCC); Congestive heart failure (HCC); Diabetes; Hypertension; Infectious disease; Psychiatric disorder; Renal disorder; or Seizure disorder ([facility]).  She  has a past surgical history that includes other and eye surgery.  Social History   Substance Use Topics   • Smoking status: Current Every Day Smoker     Packs/day: 0.50   • Smokeless tobacco: Never Used      Comment: using nicotine patch. quit 10/2   • Alcohol use No      Comment: celebrations only--newyears     Social History     Social History Narrative   • No narrative on file     Family History   Problem Relation Age of Onset   • Heart Disease Mother    • Heart Disease Father      Family Status   Relation Status   • Mo Alive   • Fa    • Bro Alive   • MGMo    • MGFa    • PGMo    • PGFa          ROS    Please see hpi     All other systems reviewed and are negative     Objective:     Blood pressure 128/80, pulse 93, temperature 36.8 °C (98.3  "°F), temperature source Temporal, height 1.626 m (5' 4\"), weight 82.1 kg (181 lb), SpO2 93 %, not currently breastfeeding. Body mass index is 31.07 kg/m².  Physical Exam:    Constitutional: Alert, no distress.    Eye: Equal, round and reactive, conjunctiva clear, lids normal.  ENMT: Lips without lesions, good dentition, oropharynx clear.  Neck: Trachea midline, no masses, no thyromegaly. No cervical or supraclavicular lymphadenopathy.  Respiratory: Unlabored respiratory effort, poor air entry bilaterally  Cardiovascular: Normal S1, S2, no murmur, no edema.  Abdomen: Soft, non-tender, no masses, no hepatosplenomegaly.  Psych: Alert and oriented x3, normal affect and mood.    Claire Reyna - Benign  Date/Time: 10/25/2018 2:42 PM  Performed by: LALITO ZAPATA  Authorized by: LALITO ZAPATA     Number of Lesions: 4  Claire Reyna - Pre-malignant  Date/Time: 10/25/2018 2:42 PM  Performed by: LALITO ZAPATA  Authorized by: LALITO ZAPATA     Number of Lesions: 2    Comment:     SKIN EXAM    ISK  Description--  Several irregular, pigmented, verrucous surface plaques with dried hemmorhage      Size 0.3  cm on left side of face    Symptoms  Patient has been complaining of lesions which have been irritating, painful and causing discomfort so is interested in removal.      AK  2 lesions on  face with evidence of of solar damage present , spotty hyperpigmentation, scattered telangiectasias, and  Xerosis      PROCEDURE: CRYOTHERAPY  Discussed risks and benefits of cryotherapy including but not limited to scarring, hyperpigmentation, hypopigmentation, hypertrophic scarring, keiloid scarring, incomplete or no resolution of lesions treated,pain, undesirable cosemetic result, blistering, potential need for additional treatment including more invasive treatment. Patient expresses understanding and verbally acknowledges risks and consent to treatment. 2  applications of cryotherapy with 3 second " freeze thaw cycle was applied to all AK's and  all irritated and inflamed Seborrheic Keratoses. Patient tolerated procedure well. There were no complications. Aftercare instructions given.              Assessment and Plan:   The following treatment plan was discussed      1. AK (actinic keratosis)  Patient tollerrated procedure well  There were no adverse events  Patient was given post procedure precautions     2. Inflamed seborrheic keratosis  Patient tollerrated procedure well  There were no adverse events  Patient was given post procedure precautions       3. Acute on chronic respiratory failure with hypoxemia (HCC)  Patient tollerrated procedure well  There were no adverse events  Patient was given post procedure precautions     FMLA signed for return to work on 14/NOV/2018    HEALTH MAINTENANCE:    Instructed to Follow up in clinic or ER for worsening symptoms, difficulty breathing, lack of expected recovery, or should new symptoms or problems arise.    Followup: Return in about 4 weeks (around 11/22/2018) for Reevaluation.       Once again this medical record contains text that has been entered with the assistance of computer voice recognition and dictation software.  Therefore, it may contain unintended errors in text, spelling, punctuation, or grammar

## 2018-10-25 NOTE — ASSESSMENT & PLAN NOTE
The patient is a 63-year-old female who presents to clinic for [place] paperwork.  She was admitted for COPD exacerbation/respiratory failure on 2 October 2018.  She was discharged on 9 October.  She was given IV fluids IV Unasyn and azithromycin as well as continuous oxygen therapy 4 L via nasal cannula.  Overall her oxygen requirement has decreased currently on 2-1/2 L via nasal cannula.  She is been working with physical therapy as well as home health aide at home.  She did receive her Pneumovax vaccine and flu vaccine in the hospital.  She states that she still has difficulty breathing on exertion and she is not able to return to work for at least a couple more weeks.  She denies any chest pain no new shortness of breath just some underlying shortness of breath from her COPD.  Denies any headache no abdominal pain no fevers no chills.

## 2018-11-02 DIAGNOSIS — J32.9 RECURRENT SINUSITIS: ICD-10-CM

## 2018-11-06 RX ORDER — CETIRIZINE HYDROCHLORIDE 10 MG/1
TABLET, FILM COATED ORAL
Qty: 90 TAB | Refills: 0 | Status: SHIPPED | OUTPATIENT
Start: 2018-11-06 | End: 2019-03-03 | Stop reason: SDUPTHER

## 2018-11-13 ENCOUNTER — TELEPHONE (OUTPATIENT)
Dept: MEDICAL GROUP | Age: 63
End: 2018-11-13

## 2018-11-13 ENCOUNTER — OFFICE VISIT (OUTPATIENT)
Dept: MEDICAL GROUP | Age: 63
End: 2018-11-13
Payer: COMMERCIAL

## 2018-11-13 VITALS
DIASTOLIC BLOOD PRESSURE: 78 MMHG | TEMPERATURE: 96.8 F | OXYGEN SATURATION: 94 % | SYSTOLIC BLOOD PRESSURE: 134 MMHG | HEART RATE: 89 BPM | HEIGHT: 64 IN | BODY MASS INDEX: 30.56 KG/M2 | WEIGHT: 179 LBS

## 2018-11-13 DIAGNOSIS — J44.1 ACUTE EXACERBATION OF CHRONIC OBSTRUCTIVE PULMONARY DISEASE (COPD) (HCC): ICD-10-CM

## 2018-11-13 DIAGNOSIS — Z12.31 VISIT FOR SCREENING MAMMOGRAM: ICD-10-CM

## 2018-11-13 DIAGNOSIS — M54.32 SCIATICA OF LEFT SIDE: ICD-10-CM

## 2018-11-13 DIAGNOSIS — L57.0 AK (ACTINIC KERATOSIS): ICD-10-CM

## 2018-11-13 DIAGNOSIS — E78.00 PURE HYPERCHOLESTEROLEMIA: ICD-10-CM

## 2018-11-13 DIAGNOSIS — L82.0 INFLAMED SEBORRHEIC KERATOSIS: ICD-10-CM

## 2018-11-13 PROBLEM — L25.9 CONTACT DERMATITIS: Status: RESOLVED | Noted: 2018-10-05 | Resolved: 2018-11-13

## 2018-11-13 PROCEDURE — 17110 DESTRUCTION B9 LES UP TO 14: CPT | Performed by: FAMILY MEDICINE

## 2018-11-13 PROCEDURE — 17003 DESTRUCT PREMALG LES 2-14: CPT | Performed by: FAMILY MEDICINE

## 2018-11-13 PROCEDURE — 99214 OFFICE O/P EST MOD 30 MIN: CPT | Mod: 25 | Performed by: FAMILY MEDICINE

## 2018-11-13 PROCEDURE — 17000 DESTRUCT PREMALG LESION: CPT | Mod: 59 | Performed by: FAMILY MEDICINE

## 2018-11-13 NOTE — ASSESSMENT & PLAN NOTE
Patient states she continues to have back pain.  She states that she has been using Mobic as needed.  She also plans on returning to physical therapy.  She denies any bladder or bowel incontinence.  No numbness or tingling in the perineum.

## 2018-11-13 NOTE — TELEPHONE ENCOUNTER
1. Name: Kyra Funez      Call Back Number: [phone] (home) [phone] (work)     Patient approves a detailed voicemail message: yes    2. Patient is requesting orders for Labs    3. Clinical Reason for Request: pt request    4. Specialty & Provider Name/Lab/Imaging Location Preference: Lab    5. Is appointment scheduled for requested order/referral: no    Patient was informed they may receive a return phone call from our office with any additional questions before processing this request.    Pt requesting normal lab panel prior to her next visit. Including Vitamin D.

## 2018-11-13 NOTE — ASSESSMENT & PLAN NOTE
Patient continues to use oxygen 1 L via nasal cannula at night.  She states that she is increasing her stamina by walking on the treadmill every day.  Denies any cough today no shortness of breath no dyspnea on exertion.

## 2018-11-13 NOTE — ASSESSMENT & PLAN NOTE
Patient has been using diet and lifestyle modification to address this.  She is not interested in adding any other medications.

## 2018-11-13 NOTE — PROGRESS NOTES
This medical record contains text that has been entered with the assistance of computer voice recognition and dictation software.  Therefore, it may contain unintended errors in text, spelling, punctuation, or grammar    Chief Complaint   Patient presents with   • Oxygen Dependency     follow up         Kyra Funez is a 63 y.o. female here evaluation and management of: routine follow up, paperwork      HPI:     Acute exacerbation of chronic obstructive pulmonary disease (COPD) (HCC)  Patient continues to use oxygen 1 L via nasal cannula at night.  She states that she is increasing her stamina by walking on the treadmill every day.  Denies any cough today no shortness of breath no dyspnea on exertion.    Sciatica of left side  Patient states she continues to have back pain.  She states that she has been using Mobic as needed.  She also plans on returning to physical therapy.  She denies any bladder or bowel incontinence.  No numbness or tingling in the perineum.    Hyperlipidemia  Patient has been using diet and lifestyle modification to address this.  She is not interested in adding any other medications.    Current medicines (including changes today)  Current Outpatient Prescriptions   Medication Sig Dispense Refill   • EQ ALLERGY RELIEF, CETIRIZINE, 10 MG Tab TAKE 1 TABLET BY MOUTH ONCE DAILY 90 Tab 0   • predniSONE (DELTASONE) 20 MG Tab Take 3 tabs po for 2 days then 2 tabs for 2 days then 1 for 2 days 12 Tab 0   • budesonide-formoterol (SYMBICORT) 80-4.5 MCG/ACT Aerosol Inhale 2 Puffs by mouth 2 Times a Day. 1 Inhaler 3   • hydrocortisone 2.5 % Cream topical cream Apply 1 Inch to affected area(s) 2 Times a Day. Do not apply to face, underarms, or groin 1 Tube 0   • albuterol 108 (90 Base) MCG/ACT Aero Soln inhalation aerosol Inhale 2 Puffs by mouth every 6 hours as needed for Shortness of Breath. 8.5 g 1   • tiotropium (SPIRIVA) 18 MCG Cap Inhale 1 Cap by mouth every day. 30 Cap 3   • Cholecalciferol  "(VITAMIN D) 2000 units Cap Take 1 Cap by mouth every day.     • meloxicam (MOBIC) 7.5 MG Tab Take 7.5 mg by mouth every day.     • cetirizine (ZYRTEC) 10 MG Tab Take 1 Tab by mouth every day. 90 Tab 1   • timolol (BETIMOL) 0.5 % ophthalmic solution Place 1 Drop in both eyes 2 times a day. use in affect       No current facility-administered medications for this visit.      She  has a past medical history of Colitis; Glaucoma (2010); Iritis; Migraine; Neck pain; and Retinopathy. She also has no past medical history of ASTHMA; CAD (coronary artery disease); Cancer (HCC); Congestive heart failure (HCC); Diabetes; Hypertension; Infectious disease; Psychiatric disorder; Renal disorder; or Seizure disorder (HCC).  She  has a past surgical history that includes other and eye surgery.  Social History   Substance Use Topics   • Smoking status: Current Every Day Smoker     Packs/day: 0.50   • Smokeless tobacco: Never Used      Comment: using nicotine patch. quit 10/2   • Alcohol use No      Comment: celebrations only--newyears     Social History     Social History Narrative   • No narrative on file     Family History   Problem Relation Age of Onset   • Heart Disease Mother    • Heart Disease Father      Family Status   Relation Status   • Mo Alive   • Fa    • Bro Alive   • MGMo    • MGFa    • PGMo    • PGFa          ROS    Please see hpi     All other systems reviewed and are negative     Objective:     Blood pressure 134/78, pulse 89, temperature 36 °C (96.8 °F), temperature source Temporal, height 1.626 m (5' 4\"), weight 81.2 kg (179 lb), SpO2 94 %, not currently breastfeeding. Body mass index is 30.73 kg/m².  Physical Exam:    Constitutional: Alert, no distress.  Skin: Warm, dry, good turgor, no rashes in visible areas.  Eye: Equal, round and reactive, conjunctiva clear, lids normal.  ENMT: Lips without lesions, good dentition, oropharynx clear.  Neck: Trachea midline, no " masses, no thyromegaly. No cervical or supraclavicular lymphadenopathy.  Respiratory: Unlabored respiratory effort, lungs clear to auscultation, no wheezes, no ronchi.  Cardiovascular: Normal S1, S2, no murmur, no edema.  Abdomen: Soft, non-tender, no masses, no hepatosplenomegaly.  Psych: Alert and oriented x3, normal affect and mood.  Claire Reyna - Pre-malignant  Date/Time: 11/13/2018 11:04 AM  Performed by: LALITO ZAPATA  Authorized by: LALITO ZAPATA     Number of Lesions: 4  Lesn Destn - Benign  Date/Time: 11/13/2018 11:04 AM  Performed by: LALITO ZAPATA  Authorized by: LALITO ZAPATA       Comments:    SKIN EXAM    ISK  Description--  Several irregular, pigmented, verrucous surface plaques with dried hemmorhage      Size0.1cm --0.3cm on face    Symptoms  Patient has been complaining of lesions which have been irritating, bleeding when washing,  interested in removal.      AK  multiple lesions on bilateral forearms, face,hands, and chest, with evidence of of solar damage present , spotty hyperpigmentation, scattered telangiectasias, and  Xerosis      PROCEDURE: CRYOTHERAPY  Discussed risks and benefits of cryotherapy including but not limited to scarring, hyperpigmentation, hypopigmentation, hypertrophic scarring, keiloid scarring, incomplete or no resolution of lesions treated,pain, undesirable cosemetic result, blistering, potential need for additional treatment including more invasive treatment. Patient expresses understanding and verbally acknowledges risks and consent to treatment. 2  applications of cryotherapy with 3 second freeze thaw cycle was applied to all AK's and  all irritated and inflamed Seborrheic Keratoses. Patient tolerated procedure well. There were no complications. Aftercare instructions given.              Assessment and Plan:   The following treatment plan was discussed      1. AK (actinic keratosis)  Patient tollerrated procedure well  There were  no adverse events  Patient was given post procedure precautions       2. Inflamed seborrheic keratosis  Patient tollerrated procedure well  There were no adverse events  Patient was given post procedure precautions       3. Visit for screening mammogram  Due for mammogram    4. Acute exacerbation of chronic obstructive pulmonary disease (COPD) (HCC)  Up to date on vaccinations  Counseled on the following   smoking cessation  avoidance of various other inhalational particulate exposures,   healthy diet and aim to achieve a normal body mass index (BMI).  Continue LABA plus inhaled corticosteroid, LAMA, anticholinergics  Continue home oxygen    pulmonology consult pending    5. Sciatica of left side  Patient has been stable with current management  We will make no changes for now          HEALTH MAINTENANCE:    Instructed to Follow up in clinic or ER for worsening symptoms, difficulty breathing, lack of expected recovery, or should new symptoms or problems arise.    Followup: Return in about 6 months (around 5/13/2019) for Reevaluation.       Once again this medical record contains text that has been entered with the assistance of computer voice recognition and dictation software.  Therefore, it may contain unintended errors in text, spelling, punctuation, or grammar

## 2019-01-14 ENCOUNTER — OFFICE VISIT (OUTPATIENT)
Dept: PULMONOLOGY | Facility: HOSPICE | Age: 64
End: 2019-01-14
Payer: COMMERCIAL

## 2019-01-14 VITALS
SYSTOLIC BLOOD PRESSURE: 126 MMHG | BODY MASS INDEX: 31.45 KG/M2 | TEMPERATURE: 97.5 F | WEIGHT: 184.2 LBS | DIASTOLIC BLOOD PRESSURE: 82 MMHG | OXYGEN SATURATION: 92 % | HEIGHT: 64 IN | RESPIRATION RATE: 16 BRPM | HEART RATE: 96 BPM

## 2019-01-14 DIAGNOSIS — J32.9 CHRONIC SINUSITIS, UNSPECIFIED LOCATION: ICD-10-CM

## 2019-01-14 DIAGNOSIS — J96.11 CHRONIC RESPIRATORY FAILURE WITH HYPOXIA (HCC): ICD-10-CM

## 2019-01-14 DIAGNOSIS — J44.9 CHRONIC OBSTRUCTIVE PULMONARY DISEASE, UNSPECIFIED COPD TYPE (HCC): ICD-10-CM

## 2019-01-14 DIAGNOSIS — Z72.0 TOBACCO ABUSE: ICD-10-CM

## 2019-01-14 PROCEDURE — 99214 OFFICE O/P EST MOD 30 MIN: CPT | Performed by: INTERNAL MEDICINE

## 2019-01-14 RX ORDER — ALBUTEROL SULFATE 90 UG/1
2 AEROSOL, METERED RESPIRATORY (INHALATION) EVERY 6 HOURS PRN
Status: ON HOLD | COMMUNITY
End: 2021-07-09

## 2019-01-14 NOTE — PROGRESS NOTES
Chief Complaint   Patient presents with   • Establish Care     Referred by        HPI: This patient is a 63 y.o. Female presents for hospital follow-up.  She was hospitalized October 2018 for acute hypoxic respiratory failure secondary to AECOPD, and evaluated by Dr. Schaeffer, pulmonologist.  Medical records were reviewed.  Chest x-ray showed no acute process.  She was discharged on 4 L of oxygen, Symbicort and Spiriva.  She has been a lifelong smoker for the past 45 years, and has tapered down to 3 cigarettes since her hospitalization.  She is on nicotine patch vs lozenges.  She has weaned herself off daytime oxygen. She has morning cough with clear sputum.  She has chronic nasal congestion she attributes to sinusitis and allergies.  She is very concerned her sinus disease exacerbates her pulmonary disease.  She initially had some difficulty accepting her COPD diagnosis, however appears understanding of her condition.  She is up-to-date on influenza and pneumococcal vaccines.      Past Medical History:   Diagnosis Date   • Allergic rhinitis    • Back pain    • Bronchitis    • Chickenpox    • Colitis     e coli and salmonella   • COPD (chronic obstructive pulmonary disease) ([facility])    • Glaucoma 2/25/2010   • Influenza    • Iritis    • Migraine    • Nasal drainage    • Neck pain    • Retinopathy    • Scarlet fever    • Tonsillitis        Social History     Social History   • Marital status: Single     Spouse name: N/A   • Number of children: N/A   • Years of education: N/A     Occupational History   • Not on file.     Social History Main Topics   • Smoking status: Current Every Day Smoker     Packs/day: 0.25     Years: 45.00     Types: Cigarettes   • Smokeless tobacco: Never Used      Comment: using nicotine patch. quit 10/2   • Alcohol use No      Comment: celebrations only--newyears   • Drug use: No   • Sexual activity: Not on file     Other Topics Concern   • Not on file     Social History Narrative   •  No narrative on file       Family History   Problem Relation Age of Onset   • Heart Failure Mother    • Heart Disease Father    • Heart Attack Father        Current Outpatient Prescriptions on File Prior to Visit   Medication Sig Dispense Refill   • EQ ALLERGY RELIEF, CETIRIZINE, 10 MG Tab TAKE 1 TABLET BY MOUTH ONCE DAILY 90 Tab 0   • budesonide-formoterol (SYMBICORT) 80-4.5 MCG/ACT Aerosol Inhale 2 Puffs by mouth 2 Times a Day. 1 Inhaler 3   • tiotropium (SPIRIVA) 18 MCG Cap Inhale 1 Cap by mouth every day. 30 Cap 3   • Cholecalciferol (VITAMIN D) 2000 units Cap Take 1 Cap by mouth every day.     • meloxicam (MOBIC) 7.5 MG Tab Take 7.5 mg by mouth every day.     • cetirizine (ZYRTEC) 10 MG Tab Take 1 Tab by mouth every day. 90 Tab 1   • timolol (BETIMOL) 0.5 % ophthalmic solution Place 1 Drop in both eyes 2 times a day. use in affect     • predniSONE (DELTASONE) 20 MG Tab Take 3 tabs po for 2 days then 2 tabs for 2 days then 1 for 2 days (Patient not taking: Reported on 1/14/2019) 12 Tab 0   • hydrocortisone 2.5 % Cream topical cream Apply 1 Inch to affected area(s) 2 Times a Day. Do not apply to face, underarms, or groin (Patient not taking: Reported on 1/14/2019) 1 Tube 0   • albuterol 108 (90 Base) MCG/ACT Aero Soln inhalation aerosol Inhale 2 Puffs by mouth every 6 hours as needed for Shortness of Breath. (Patient not taking: Reported on 1/14/2019) 8.5 g 1     No current facility-administered medications on file prior to visit.        Allergies: Hydrocortisone; Iodine; Latex; Maxepa; Niacin; Shellfish allergy; and Tomato    ROS:   Constitutional: Denies fevers, chills, night sweats, fatigue or weight loss  Eyes: Denies vision loss, pain, drainage, double vision  Ears, Nose, Throat: Denies earache, difficulty hearing, tinnitus, nasal congestion, hoarseness  Cardiovascular: Denies chest pain, tightness, palpitations, orthopnea or edema  Respiratory:As in HPI  Sleep: Denies daytime sleepiness, snoring, apneas,  "insomnia, morning headaches  GI: Denies heartburn, dysphagia, nausea, abdominal pain, diarrhea or constipation  : Denies frequent urination, hematuria, discharge or painful urination  Musculoskeletal: +back pain, denies painful joints, sore muscles  Neurological: Denies weakness or headaches  Skin: +rashes    Blood pressure 126/82, pulse 96, temperature 36.4 °C (97.5 °F), temperature source Temporal, resp. rate 16, height 1.626 m (5' 4\"), weight 83.6 kg (184 lb 3.2 oz), SpO2 92 %, not currently breastfeeding.    Physical Exam:  Appearance: Well-nourished, well-developed, in no acute distress  HEENT: Normocephalic, atraumatic, white sclera, left pupil nonreactive, oropharynx clear  Neck: No adenopathy or masses  Respiratory: no intercostal retractions or accessory muscle use  Lungs auscultation: Clear to auscultation bilaterally, diminished breath sounds  Cardiovascular: Regular rate rhythm. No murmurs, rubs or gallops.  No LE edema  Abdomen: soft, nondistended  Gait: Normal  Digits: No clubbing, cyanosis  Motor: No focal deficits  Orientation: Oriented to time, person and place    Diagnosis:  1. Chronic obstructive pulmonary disease, unspecified COPD type (HCC)  PULMONARY FUNCTION TESTS -Test requested: Complete Pulmonary Function Test   2. Chronic respiratory failure with hypoxia (HCC)     3. Tobacco abuse     4. Chronic sinusitis, unspecified location  CT-MAXILLOFACIAL W/O PLUS RECONS    CANCELED: CT-MAXILLOFACIAL W/O       Plan:  The patient was hospitalized October 2018 for acute hypoxic respiratory failure secondary to COPD.  She continues to smoke however has tapered to 3 cigarettes daily.  Smoking cessation counseling provided.  She is currently using nicotine lozenges, which was encouraged.  Her respiratory symptoms have improved significantly.  She is compliant with Symbicort and Spiriva inhalers.  Recommend increasing Symbicort to160 mcg dose BID.  Obtain pulmonary function testing for staging of " COPD.  Obtain 6-minute walk test on room air.  Refer to pulmonary rehabilitation following PFTs.  Consider lung cancer screening low-dose chest CT.  CT sinuses for evaluation of chronic sinusitis.  Return in about 4 weeks (around 2/11/2019) for after PFT.

## 2019-01-14 NOTE — PATIENT INSTRUCTIONS
Tobacco Use Disorder  Tobacco use disorder (TUD) is a mental disorder. It is the long-term use of tobacco in spite of related health problems or difficulty with normal life activities. Tobacco is most commonly smoked as cigarettes and less commonly as cigars or pipes. Smokeless chewing tobacco and snuff are also popular. People with TUD get a feeling of extreme pleasure (euphoria) from using tobacco and have a desire to use it again and again. Repeated use of tobacco can cause problems.  The addictive effects of tobacco are due mainly to the ingredient nicotine. Nicotine also causes a rush of adrenaline (epinephrine) in the body. This leads to increased blood pressure, heart rate, and breathing rate. These changes may cause problems for people with high blood pressure, weak hearts, or lung disease. High doses of nicotine in children and pets can lead to seizures and death.  Tobacco contains a number of other unsafe chemicals. These chemicals are especially harmful when inhaled as smoke and can damage almost every organ in the body. Smokers live shorter lives than nonsmokers and are at risk of dying from a number of diseases and cancers. Tobacco smoke can also cause health problems for nonsmokers (due to inhaling secondhand smoke). Smoking is also a fire hazard.  TUD usually starts in the late teenage years and is most common in young adults between the ages of 18 and 25 years. People who start smoking earlier in life are more likely to continue smoking as adults. TUD is somewhat more common in men than women. People with TUD are at higher risk for using alcohol and other drugs of abuse.  What increases the risk?  Risk factors for TUD include:  · Having family members with the disorder.  · Being around people who use tobacco.  · Having an existing mental health issue such as schizophrenia, depression, bipolar disorder, ADHD, or posttraumatic stress disorder (PTSD).  What are the signs or symptoms?  People with  tobacco use disorder have two or more of the following signs and symptoms within 12 months:  · Use of more tobacco over a longer period than intended.  · Not able to cut down or control tobacco use.  · A lot of time spent obtaining or using tobacco.  · Strong desire or urge to use tobacco (craving). Cravings may last for 6 months or longer after quitting.  · Use of tobacco even when use leads to major problems at work, school, or home.  · Use of tobacco even when use leads to relationship problems.  · Giving up or cutting down on important life activities because of tobacco use.  · Repeatedly using tobacco in situations where it puts you or others in physical danger, like smoking in bed.  · Use of tobacco even when it is known that a physical or mental problem is likely related to tobacco use.  ¨ Physical problems are numerous and may include chronic bronchitis, emphysema, lung and other cancers, gum disease, high blood pressure, heart disease, and stroke.  ¨ Mental problems caused by tobacco may include difficulty sleeping and anxiety.  · Need to use greater amounts of tobacco to get the same effect. This means you have developed a tolerance.  · Withdrawal symptoms as a result of stopping or rapidly cutting back use. These symptoms may last a month or more after quitting and include the following:  ¨ Depressed, anxious, or irritable mood.  ¨ Difficulty concentrating.  ¨ Increased appetite.  ¨ Restlessness or trouble sleeping.  ¨ Use of tobacco to avoid withdrawal symptoms.  How is this diagnosed?  Tobacco use disorder is diagnosed by your health care provider. A diagnosis may be made by:  · Your health care provider asking questions about your tobacco use and any problems it may be causing.  · A physical exam.  · Lab tests.  · You may be referred to a mental health professional or addiction specialist.  The severity of tobacco use disorder depends on the number of signs and symptoms you have:  · Mild--Two or three  symptoms.  · Moderate--Four or five symptoms.  · Severe--Six or more symptoms.  How is this treated?  Many people with tobacco use disorder are unable to quit on their own and need help. Treatment options include the following:  · Nicotine replacement therapy (NRT). NRT provides nicotine without the other harmful chemicals in tobacco. NRT gradually lowers the dosage of nicotine in the body and reduces withdrawal symptoms. NRT is available in over-the-counter forms (gum, lozenges, and skin patches) as well as prescription forms (mouth inhaler and nasal spray).  · Medicines. This may include:  ¨ Antidepressant medicine that may reduce nicotine cravings.  ¨ A medicine that acts on nicotine receptors in the brain to reduce cravings and withdrawal symptoms. It may also block the effects of tobacco in people with TUD who relapse.  · Counseling or talk therapy. A form of talk therapy called behavioral therapy is commonly used to treat people with TUD. Behavioral therapy looks at triggers for tobacco use, how to avoid them, and how to cope with cravings. It is most effective in person or by phone but is also available in self-help forms (books and Internet websites).  · Support groups. These provide emotional support, advice, and guidance for quitting tobacco.  The most effective treatment for TUD is usually a combination of medicine, talk therapy, and support groups.  Follow these instructions at home:  · Keep all follow-up visits as directed by your health care provider. This is important.  · Take medicines only as directed by your health care provider.  · Check with your health care provider before starting new prescription or over-the-counter medicines.  Contact a health care provider if:  · You are not able to take your medicines as prescribed.  · Treatment is not helping your TUD and your symptoms get worse.  Get help right away if:  · You have serious thoughts about hurting yourself or others.  · You have trouble  breathing, chest pain, sudden weakness, or sudden numbness in part of your body.  This information is not intended to replace advice given to you by your health care provider. Make sure you discuss any questions you have with your health care provider.  Document Released: 08/23/2005 Document Revised: 08/20/2017 Document Reviewed: 02/13/2015  [employer] Patient Education © 2017 [employer]

## 2019-01-19 ENCOUNTER — HOSPITAL ENCOUNTER (OUTPATIENT)
Dept: RADIOLOGY | Facility: MEDICAL CENTER | Age: 64
End: 2019-01-19
Attending: INTERNAL MEDICINE
Payer: COMMERCIAL

## 2019-01-19 DIAGNOSIS — J32.9 CHRONIC SINUSITIS, UNSPECIFIED LOCATION: ICD-10-CM

## 2019-01-19 PROCEDURE — 70486 CT MAXILLOFACIAL W/O DYE: CPT

## 2019-01-29 RX ORDER — TIOTROPIUM BROMIDE 18 UG/1
18 CAPSULE ORAL; RESPIRATORY (INHALATION) DAILY
Qty: 30 CAP | Refills: 3 | Status: SHIPPED | OUTPATIENT
Start: 2019-01-29 | End: 2019-05-31 | Stop reason: SDUPTHER

## 2019-01-29 RX ORDER — BUDESONIDE AND FORMOTEROL FUMARATE DIHYDRATE 80; 4.5 UG/1; UG/1
2 AEROSOL RESPIRATORY (INHALATION) 2 TIMES DAILY
Qty: 1 INHALER | Refills: 3 | Status: SHIPPED | OUTPATIENT
Start: 2019-01-29 | End: 2019-06-20 | Stop reason: SDUPTHER

## 2019-01-29 NOTE — TELEPHONE ENCOUNTER
Have we ever prescribed this med? Yes.  If yes, what date? 10/9/18 through [facility]    Last OV: 1/14/19     Next OV: 2/25/19    DX: COPD    Medications: budesonide-formoterol (SYMBICORT) 80-4.5 MCG/ACT Aerosol, tiotropium (SPIRIVA) 18 MCG Cap

## 2019-02-22 DIAGNOSIS — R06.02 SOB (SHORTNESS OF BREATH): ICD-10-CM

## 2019-02-22 DIAGNOSIS — J44.9 CHRONIC OBSTRUCTIVE PULMONARY DISEASE, UNSPECIFIED COPD TYPE (HCC): ICD-10-CM

## 2019-02-22 NOTE — PROGRESS NOTES
Dr. Meyer's last OV note stated pt should do 6MWT but she did not enter an order.  Pt will see you Monday with PFT, I can do a 6MWT as well if you sign the order. Thanks!    Plan: 1/14/19 Dr. Meyer  The patient was hospitalized October 2018 for acute hypoxic respiratory failure secondary to COPD.  She continues to smoke however has tapered to 3 cigarettes daily.  Smoking cessation counseling provided.  She is currently using nicotine lozenges, which was encouraged.  Her respiratory symptoms have improved significantly.  She is compliant with Symbicort and Spiriva inhalers.  Recommend increasing Symbicort to160 mcg dose BID.  Obtain pulmonary function testing for staging of COPD.  Obtain 6-minute walk test on room air.  Refer to pulmonary rehabilitation following PFTs.  Consider lung cancer screening low-dose chest CT.  CT sinuses for evaluation of chronic sinusitis.  Return in about 4 weeks (around 2/11/2019) for after PFT.

## 2019-02-25 ENCOUNTER — APPOINTMENT (OUTPATIENT)
Dept: PULMONOLOGY | Facility: HOSPICE | Age: 64
End: 2019-02-25
Payer: COMMERCIAL

## 2019-02-25 ENCOUNTER — NON-PROVIDER VISIT (OUTPATIENT)
Dept: PULMONOLOGY | Facility: HOSPICE | Age: 64
End: 2019-02-25
Attending: INTERNAL MEDICINE
Payer: COMMERCIAL

## 2019-02-25 VITALS — WEIGHT: 183 LBS | HEIGHT: 64 IN | BODY MASS INDEX: 31.24 KG/M2

## 2019-02-25 DIAGNOSIS — J44.9 CHRONIC OBSTRUCTIVE PULMONARY DISEASE, UNSPECIFIED COPD TYPE (HCC): ICD-10-CM

## 2019-02-25 PROCEDURE — 94729 DIFFUSING CAPACITY: CPT | Performed by: INTERNAL MEDICINE

## 2019-02-25 PROCEDURE — 94060 EVALUATION OF WHEEZING: CPT | Performed by: INTERNAL MEDICINE

## 2019-02-25 PROCEDURE — 94726 PLETHYSMOGRAPHY LUNG VOLUMES: CPT | Performed by: INTERNAL MEDICINE

## 2019-02-25 ASSESSMENT — PULMONARY FUNCTION TESTS
FEV1/FVC_PERCENT_LLN: 64
FVC_PERCENT_PREDICTED: 54
FEV1_PERCENT_CHANGE: 27
FEV1/FVC: 43.53
FEV1/FVC_PERCENT_PREDICTED: 76
FEV1/FVC_PERCENT_PREDICTED: 55
FEV1_PREDICTED: 2.39
FEV1_PERCENT_CHANGE: 32
FEV1/FVC_PERCENT_CHANGE: 119
FEV1/FVC: 42
FEV1: .56
FEV1/FVC_PERCENT_CHANGE: 3
FVC_PREDICTED: 3.14
FEV1_PERCENT_PREDICTED: 23
FVC: 1.7
FEV1_LLN: 2.00
FVC_PERCENT_PREDICTED: 42
FEV1_PERCENT_PREDICTED: 30
FEV1/FVC: 44
FVC_LLN: 2.62
FEV1/FVC: 42
FEV1: .74
FEV1/FVC_PREDICTED: 77
FVC: 1.33
FEV1/FVC_PERCENT_PREDICTED: 56
FEV1/FVC_PERCENT_PREDICTED: 54
FEV1/FVC_PERCENT_PREDICTED: 56

## 2019-02-25 NOTE — PROCEDURES
Technician: RUSTY Woodard    Technician Comment:  Good patient effort & cooperation.  The results of this test meet the ATS/ERS standards for acceptability & reproducibility.  Test was performed on the [employer] Body Plethysmograph-Elite DX system.  Predicted values were [facility]-3 for spirometry, [facility] for DLCO, ITS for Lung Volumes.  The DLCO was uncorrected for Hgb.  A bronchodilator of Ventolin HFA -2puffs via spacer administered.  DLCO performed during dilation period.    The FVC is 1.7 L or 54%, FEV1 is 0.74 L or 30%, FEV1/FVC 40%.  Significant bronchodilator response.  Increased TLC and RV.  DLCO: 60%.    Interpretation:  PFTs are consistent with very severe COPD, with asthmatic component, best FEV 1.74 L or 30% predicted.  Hyperinflation and air trapping consistent with obstructive airways disease.  Mild gas transfer impairment consistent with emphysema.

## 2019-03-03 DIAGNOSIS — J32.9 RECURRENT SINUSITIS: ICD-10-CM

## 2019-03-04 ENCOUNTER — TELEPHONE (OUTPATIENT)
Dept: PULMONOLOGY | Facility: HOSPICE | Age: 64
End: 2019-03-04

## 2019-03-04 DIAGNOSIS — R06.02 SOB (SHORTNESS OF BREATH): ICD-10-CM

## 2019-03-05 ENCOUNTER — OFFICE VISIT (OUTPATIENT)
Dept: PULMONOLOGY | Facility: HOSPICE | Age: 64
End: 2019-03-05
Payer: COMMERCIAL

## 2019-03-05 ENCOUNTER — NON-PROVIDER VISIT (OUTPATIENT)
Dept: PULMONOLOGY | Facility: HOSPICE | Age: 64
End: 2019-03-05
Attending: PHYSICIAN ASSISTANT
Payer: COMMERCIAL

## 2019-03-05 VITALS
RESPIRATION RATE: 16 BRPM | DIASTOLIC BLOOD PRESSURE: 74 MMHG | SYSTOLIC BLOOD PRESSURE: 128 MMHG | HEART RATE: 102 BPM | BODY MASS INDEX: 31.41 KG/M2 | OXYGEN SATURATION: 93 % | HEIGHT: 64 IN

## 2019-03-05 DIAGNOSIS — J44.9 CHRONIC OBSTRUCTIVE PULMONARY DISEASE, UNSPECIFIED COPD TYPE (HCC): ICD-10-CM

## 2019-03-05 DIAGNOSIS — R06.02 SOB (SHORTNESS OF BREATH): ICD-10-CM

## 2019-03-05 DIAGNOSIS — F17.211 NICOTINE DEPENDENCE, CIGARETTES, IN REMISSION: ICD-10-CM

## 2019-03-05 DIAGNOSIS — J96.11 CHRONIC RESPIRATORY FAILURE WITH HYPOXIA (HCC): ICD-10-CM

## 2019-03-05 PROCEDURE — 94618 PULMONARY STRESS TESTING: CPT | Performed by: INTERNAL MEDICINE

## 2019-03-05 PROCEDURE — 99213 OFFICE O/P EST LOW 20 MIN: CPT | Performed by: PHYSICIAN ASSISTANT

## 2019-03-05 RX ORDER — CETIRIZINE HYDROCHLORIDE 10 MG/1
TABLET, FILM COATED ORAL
Qty: 90 TAB | Refills: 0 | Status: SHIPPED | OUTPATIENT
Start: 2019-03-05 | End: 2019-06-11

## 2019-03-05 RX ORDER — NICOTINE 21 MG/24HR
1 PATCH, TRANSDERMAL 24 HOURS TRANSDERMAL EVERY 24 HOURS
Qty: 30 PATCH | Refills: 1 | Status: SHIPPED
Start: 2019-03-05 | End: 2021-08-09

## 2019-03-05 ASSESSMENT — 6 MINUTE WALK TEST (6MWT)
SAO2 AT 1 MIN: 94
SITTING BLOOD PRESSURE: 128/74
PERCEIVED BREATHLESSNESS AT 2 MIN: 0
SAO2 AT 2 MIN: 89
PERCEIVED BREATHLESSNESS AT 5 MIN: 0
SAO2 AT 6 MIN: 88
PERCEIVED BREATHLESSNESS AT 3 MIN: 0
PERCEIVED BREATHLESSNESS AT 6 MIN: 0
PERCEIVED FATIGUE AT 1 MIN: 2
HEART RATE: 96
HEART RATE AT 1 MIN: 127
PERCEIVED BREATHLESSNESS AT 2 MIN: 0
TOTAL REST TIME: 0
PERCENT OF NORMAL WALKED: 28
HEART RATE AT 6 MIN: 125
HEART RATE AT 3 MIN: 124
PERCEIVED FATIGUE AT 6 MIN: 2
PERCEIVED FATIGUE AT 1 MIN: 2
O2 SAT PERCENT ROOM AIR: 97
PERCEIVED BREATHLESSNESS AT 1 MIN: 0
PERCEIVED FATIGUE AT 3 MIN: 2
HEART RATE AT 4 MIN: 124
PERCEIVED FATIGUE AT 4 MIN: 2
SAO2 AT 4 MIN: 86
SAO2 AT 5 MIN: 87
SAO2 AT 1 MIN: 88
SAO2 AT 3 MIN: 88
BLOOD PRESSURE AT 1 MIN: 128/74
PERCEIVED BREATHLESSNESS AT 1 MIN: 0
HEART RATE AT 2 MIN: 117
HEART RATE AT 5 MIN: 125
AMBULATES WITH O2: WITHOUT O2
PERCEIVED FATIGUE AT 2 MIN: 2
NUMBER OF RESTS: 0
SAO2 AT 2 MIN: 93
HEART RATE AT 2 MIN: 127
PERCEIVED BREATHLESSNESS AT 4 MIN: 0
PERCEIVED FATIGUE AT 5 MIN: 2
HEART RATE AT 1 MIN: 113
PERCEIVED FATIGUE AT 2 MIN: 2
BLOOD PRESSURE: RIGHT ARM

## 2019-03-05 NOTE — PATIENT INSTRUCTIONS
1-continue Symbicort  2-Improve oral hygiene   3-strongly encouraged complete smoking cessation   4-supplied with pulmonary rehab info  5-follow up 3 months

## 2019-03-05 NOTE — PROCEDURES
Test on Room Air. Patient would not go on 02.    The patient walked on room air.  She did have oxygen desaturation down to 86% at the 4-minute walk.  The patient declined supplemental oxygen.  The patient walked for a total distance of 400 feet which is 28% of her predicted distance.

## 2019-03-10 NOTE — PROGRESS NOTES
CC    HPI:  Kyra Funez is a 63 y.o. year old female here today for follow-up 6-minute walk test and pulmonary function test results.  Patient last seen in clinic by Dr. Meyer on 1/14/2019.  Patient is accompanied by her brother and appears uncertain of some responses as well very emotional.  Patient is transportation limited she has been receiving in-home physical therapy for her back which is been going well.  She is interested in pulmonary rehab but unable to attend classes this time.  Provided with some online resources.    Patient did have a 6-minute walk test completed today.  At rest her oxygen saturation was 97% on room she did decrease to 94 % at 1 minute, 93 % at 2 minutes, 88% on 3 minutes and 86% at 4 minutes.  Patient did refuse to go on O2.  O2 sat was 87% on 5 minutes and 88% at 6 minutes. Patient was only able to walk 400 feet, 28% predicted.  Post walk her O2 sat was 88% at 1 minute with no change from pre-exertional blood pressure, her heart rate however elevated from 96 to 127 bpm.  Pulmonologist review pending.    Patient seen on 2/25/2019 for pulmonary function testing.  Her FVC was 1.33 L or 42% predicted before and 1.7 L or 54% predicted after, FEV1 was 0.56 L or 23% predicted before and 0.74 L or 30% predicted after, FEV1/FVC was 40% predicted, significant postbronchodilator response was seen.  Patient had increased TLC 6.27 L or 124% predicted and residual volume of 4 per 200% predicted, her DLCO was 60% of predicted. Per pulmonologist interpretation PFT is consistent with very severe COPD and asthmatic component, hyperinflation and air trapping consistent with obstructive airway disease, mild gas transfer impairment consistent with emphysema.     Patient did have an echo in October 2018 which per cardiology interpretation demonstrated a normal transthoracic echocardiogram with LVEF of 65% estimated. Reviewed in clinic vitals including blood pressure of 128/74, weight of 102, O2 sat of  93%. Patient's body mass index is 31.41 kg/m². Exercise and nutrition counseling were performed at this visit.  Requested prescription for nicotine patches for health savings account reimbursement.    ROS:   Constitutional: Denies fevers, chills, night sweats, fatigue or weight loss  Skin: Rashes, hair or nail changes, lumps or sores   Eyes: Has had lens implants, remains vision impaired, retinopathy  Ears, Nose, Throat:  history of seasonal allergies as well as makeup/food/other and sinus infections, denies persistent hoarseness or sore throat, occasional earaches with sinus infections, intermittent nasal congestion, runny nose   GI: Denies heartburn/reflux, difficulty swallowing, N/V/D, abdominal pain  Respiratory: cough with clear production, denies shortness of breath at rest, reports moderate shortness of breath with activity, history of pneumonia x1, bronchitis x2, no history of TB, reports occupational exposure due to a very aam[employer] work environment at the [employer]  CV: Denies chest pain, tightness, palpitations, heart murmur, orthopnea, reports had edema prior to being hospitalized      Past Medical History:   Diagnosis Date   • Allergic rhinitis    • Back pain    • Bronchitis    • Chickenpox    • Colitis     e coli and salmonella   • COPD (chronic obstructive pulmonary disease) ([facility])    • Glaucoma 2/25/2010   • Influenza    • Iritis    • Migraine    • Nasal drainage    • Neck pain    • Retinopathy    • Scarlet fever    • Tonsillitis        Past Surgical History:   Procedure Laterality Date   • EYE SURGERY      multiple xiomara eye surgeries, lens implants   • OTHER      irridotomy both eyes & lense implants   • PB REMV 2ND CATARACT,CORN-SCLER SECTN         Family History   Problem Relation Age of Onset   • Heart Failure Mother    • Heart Disease Father    • Heart Attack Father        Social History     Social History   • Marital status: Single     Spouse name: N/A   • Number of children: N/A   •  "Years of education: N/A     Occupational History   • Not on file.     Social History Main Topics   • Smoking status: Current Every Day Smoker     Packs/day: 0.25     Years: 45.00     Types: Cigarettes   • Smokeless tobacco: Never Used      Comment: using nicotine patch. quit 10/2   • Alcohol use No      Comment: celebrations only--newyears   • Drug use: No   • Sexual activity: Not on file     Other Topics Concern   • Not on file     Social History Narrative   • No narrative on file       Allergies as of 03/05/2019 - Reviewed 03/05/2019   Allergen Reaction Noted   • Hydrocortisone  10/25/2018   • Iodine Shortness of Breath 12/28/2008   • Latex  10/02/2018   • Maxepa Hives 12/28/2008   • Niacin Shortness of Breath 12/28/2008   • Shellfish allergy  02/25/2010   • Tomato  10/02/2018        @Vital signs for this encounter:  Vitals:    03/05/19 1300   Height: 1.626 m (5' 4\")   BP: 128/74   Pulse: (!) 102   Resp: 16   O2 sat % room air: 93 %       Current medications as of today   Current Outpatient Prescriptions   Medication Sig Dispense Refill   • nicotine (EQ NICOTINE) 14 MG/24HR PATCH 24 HR Apply 1 Patch to skin as directed every 24 hours. 30 Patch 1   • budesonide-formoterol (SYMBICORT) 80-4.5 MCG/ACT Aerosol Inhale 2 Puffs by mouth 2 Times a Day. 1 Inhaler 3   • tiotropium (SPIRIVA) 18 MCG Cap Inhale 1 Cap by mouth every day. 30 Cap 3   • Cholecalciferol (VITAMIN D) 2000 units Cap Take 1 Cap by mouth every day.     • cetirizine (ZYRTEC) 10 MG Tab Take 1 Tab by mouth every day. 90 Tab 1   • timolol (BETIMOL) 0.5 % ophthalmic solution Place 1 Drop in both eyes 2 times a day. use in affect     • EQ ALLERGY RELIEF, CETIRIZINE, 10 MG Tab TAKE 1 TABLET BY MOUTH ONCE DAILY 90 Tab 0   • albuterol (VENTOLIN HFA) 108 (90 Base) MCG/ACT Aero Soln inhalation aerosol Inhale 2 Puffs by mouth every 6 hours as needed for Shortness of Breath.     • predniSONE (DELTASONE) 20 MG Tab Take 3 tabs po for 2 days then 2 tabs for 2 days then " 1 for 2 days (Patient not taking: Reported on 1/14/2019) 12 Tab 0   • hydrocortisone 2.5 % Cream topical cream Apply 1 Inch to affected area(s) 2 Times a Day. Do not apply to face, underarms, or groin (Patient not taking: Reported on 1/14/2019) 1 Tube 0   • albuterol 108 (90 Base) MCG/ACT Aero Soln inhalation aerosol Inhale 2 Puffs by mouth every 6 hours as needed for Shortness of Breath. (Patient not taking: Reported on 1/14/2019) 8.5 g 1   • meloxicam (MOBIC) 7.5 MG Tab Take 7.5 mg by mouth every day.       No current facility-administered medications for this visit.          Physical Exam:   Gen:           Alert and oriented, No apparent distress. Mood and affect appropriate, normal interaction with provider.  Eyes:          sclere white, conjunctive moist.  Hearing:     Grossly intact.  Dentition:    Good dentition.  Oropharynx:   Tongue finding white coating, posterior pharynx with mild erythema no exudate.  Mallampati Classification: II-III  Neck:        Supple, trachea midline, no masses.  Respiratory Effort: No intercostal retractions or use of accessory muscles.   Lung Auscultation:      Decreased throughout; no rales, rhonchi or wheezing.  CV:            Regular rate and rhythm. No murmurs, rubs or gallops.  Digits, Nails, Ext: No clubbing, cyanosis, petechiae, or nodes.   Skin:        No rashes, lesions or ulcers noted on back or exposed skin surfaces.                     Assessment:  1. Chronic obstructive pulmonary disease, unspecified COPD type (HCC)   continue current home regimen, improve oral hygiene thrush is a risk with ICS, was not doing   2. Chronic respiratory failure with hypoxia ([facility])   has oxygen at home which she will wear at 1 L at night and occasionally 3-5 L with exertion during the day   3.       Nicotine dependence in remission   using nicotine patch,         reports smokes occasional cigarette  4. BMI 31.0-31.9      Obesity counseling    Immunizations:    Flu: 10/3/2018  Pneumovax 23:  Deferred  Prevnar 13: 10/15/2018    Plan:  1-continue Symbicort  2-Improve oral hygiene   3-strongly encouraged complete smoking cessation   4-supplied with pulmonary rehab alternative resources  5-follow up 3 months    This dictation was created using voice recognition software. The accuracy of the dictation is limited to the abilities of the software. I expect there may be some errors of grammar and possibly content.

## 2019-05-08 NOTE — PROGRESS NOTES
Due medications given without issue. Per RT O2 decreased to 3.5L NC, O2 sat 94%. Assist to BSC, desat to 88% when ambulating recovers with few minutes rest. No c/o. Call light in reach.    Composite Graft Text: The defect edges were debeveled with a #15 scalpel blade.  Given the location of the defect, shape of the defect, the proximity to free margins and the fact the defect was full thickness a composite graft was deemed most appropriate.  The defect was outline and then transferred to the donor site.  A full thickness graft was then excised from the donor site. The graft was then placed in the primary defect, oriented appropriately and then sutured into place.  The secondary defect was then repaired using a primary closure.

## 2019-05-31 DIAGNOSIS — J44.9 CHRONIC OBSTRUCTIVE PULMONARY DISEASE, UNSPECIFIED COPD TYPE (HCC): ICD-10-CM

## 2019-05-31 RX ORDER — TIOTROPIUM BROMIDE 18 UG/1
CAPSULE ORAL; RESPIRATORY (INHALATION)
Qty: 90 CAP | Refills: 3 | Status: SHIPPED | OUTPATIENT
Start: 2019-05-31 | End: 2019-09-03 | Stop reason: SDUPTHER

## 2019-05-31 NOTE — TELEPHONE ENCOUNTER
Have we ever prescribed this med? Yes.  If yes, what date? 01/29/2019    Last OV: 03/05/2019 - ADELA BEEBE    Next OV: 06/06/2019 - ADELA BEEBE    DX: COPD    Medications: Spiriva

## 2019-06-11 ENCOUNTER — OFFICE VISIT (OUTPATIENT)
Dept: MEDICAL GROUP | Age: 64
End: 2019-06-11
Payer: COMMERCIAL

## 2019-06-11 VITALS
HEART RATE: 104 BPM | DIASTOLIC BLOOD PRESSURE: 76 MMHG | BODY MASS INDEX: 31.45 KG/M2 | SYSTOLIC BLOOD PRESSURE: 128 MMHG | TEMPERATURE: 98.2 F | WEIGHT: 184.2 LBS | OXYGEN SATURATION: 91 % | HEIGHT: 64 IN

## 2019-06-11 DIAGNOSIS — E66.9 OBESITY (BMI 30-39.9): ICD-10-CM

## 2019-06-11 DIAGNOSIS — F17.210 SMOKING GREATER THAN 30 PACK YEARS: ICD-10-CM

## 2019-06-11 DIAGNOSIS — J96.21 ACUTE ON CHRONIC RESPIRATORY FAILURE WITH HYPOXEMIA (HCC): ICD-10-CM

## 2019-06-11 DIAGNOSIS — J32.9 RECURRENT SINUSITIS: ICD-10-CM

## 2019-06-11 DIAGNOSIS — M54.32 SCIATICA OF LEFT SIDE: ICD-10-CM

## 2019-06-11 DIAGNOSIS — L57.0 AK (ACTINIC KERATOSIS): ICD-10-CM

## 2019-06-11 DIAGNOSIS — E78.00 PURE HYPERCHOLESTEROLEMIA: ICD-10-CM

## 2019-06-11 DIAGNOSIS — L82.0 INFLAMED SEBORRHEIC KERATOSIS: ICD-10-CM

## 2019-06-11 PROCEDURE — 17000 DESTRUCT PREMALG LESION: CPT | Mod: 59 | Performed by: FAMILY MEDICINE

## 2019-06-11 PROCEDURE — 17003 DESTRUCT PREMALG LES 2-14: CPT | Performed by: FAMILY MEDICINE

## 2019-06-11 PROCEDURE — 99214 OFFICE O/P EST MOD 30 MIN: CPT | Mod: 25 | Performed by: FAMILY MEDICINE

## 2019-06-11 PROCEDURE — 17110 DESTRUCTION B9 LES UP TO 14: CPT | Performed by: FAMILY MEDICINE

## 2019-06-11 RX ORDER — CETIRIZINE HYDROCHLORIDE 10 MG/1
10 TABLET ORAL DAILY
Qty: 90 TAB | Refills: 1 | Status: SHIPPED
Start: 2019-06-11 | End: 2020-02-25

## 2019-06-11 RX ORDER — MONTELUKAST SODIUM 10 MG/1
10 TABLET ORAL DAILY
Qty: 90 TAB | Refills: 2 | Status: SHIPPED | OUTPATIENT
Start: 2019-06-11 | End: 2020-03-26

## 2019-06-11 NOTE — PROGRESS NOTES
This medical record contains text that has been entered with the assistance of computer voice recognition and dictation software.  Therefore, it may contain unintended errors in text, spelling, punctuation, or grammar      Chief Complaint   Patient presents with   • Actinic Keratosis     follow up         Kyra Funez is a 64 y.o. female here evaluation and management of: follow up       HPI:     1. Smoking greater than 30 pack years    The patient states she has been trying to stop smoking, she is now trying to transition into E cigarettes.  She states sometimes she will take a regular cigarette but only 2 puffs and then walks away.  She denies any hemoptysis, no unintentional weight loss no fevers no chills no generalized malaise.    2. Sciatica of left side  Patient states that she is going to follow-up with her spine surgeon who plans on proceeding with an epidural steroid injection.  She has worked with chiropractors in the past as well as physical therapy she states she has used home exercises such as stretching with mild improvement.  She denies any loss of bladder or bowel function, no numbness or tingling anywhere.    3. Pure hypercholesterolemia  Patient is due for repeat labs.  She is been using diet and exercise to address that she states.    4. AK (actinic keratosis)      The patient is concerned about these pink, tender, growths, that have developed over the last several months. The patient is concerned about rough flaky lesions on sun exposed areas developing over the past few months.      5. ISK    The patient noticed this lesion on face and is concerned because of the following 3 reasons. The lesion has been irritated, itchy, and painfull.    6. Acute on chronic respiratory failure with hypoxemia (HCC)  The patient does see pulmonology, she is compliant with her inhalers both Spiriva, Symbicort and albuterol as needed.  She does take home oxygen at night 1 L via nasal cannula.  She also needs an  updated prescription so that she may continue her home oxygen.    7. Recurrent sinusitis  Patient states that Zyrtec was mildly helpful she needs something else in combination to address her sinus issues.  Is been complaining of nasal sinus fullness stuffiness, thick green nasal discharge.    8. Obesity    The patient states that she has been doing the treadmill about 15 minutes per session maybe 1-2 times per week.  She states that she knows she can do a better job on her diet.    Current medicines (including changes today)  Current Outpatient Prescriptions   Medication Sig Dispense Refill   • Misc. Devices Misc Please provide with home nightly oxygen 1-2L via NC 1 Application 1   • cetirizine (ZYRTEC) 10 MG Tab Take 1 Tab by mouth every day. 90 Tab 1   • montelukast (SINGULAIR) 10 MG Tab Take 1 Tab by mouth every day. 90 Tab 2   • SPIRIVA HANDIHALER 18 MCG Cap INHALE 1 PUFF BY MOUTH ONCE DAILY 90 Cap 3   • nicotine (EQ NICOTINE) 14 MG/24HR PATCH 24 HR Apply 1 Patch to skin as directed every 24 hours. 30 Patch 1   • budesonide-formoterol (SYMBICORT) 80-4.5 MCG/ACT Aerosol Inhale 2 Puffs by mouth 2 Times a Day. 1 Inhaler 3   • Cholecalciferol (VITAMIN D) 2000 units Cap Take 1 Cap by mouth every day.     • meloxicam (MOBIC) 7.5 MG Tab Take 7.5 mg by mouth every day.     • timolol (BETIMOL) 0.5 % ophthalmic solution Place 1 Drop in both eyes 2 times a day. use in affect     • albuterol (VENTOLIN HFA) 108 (90 Base) MCG/ACT Aero Soln inhalation aerosol Inhale 2 Puffs by mouth every 6 hours as needed for Shortness of Breath.     • predniSONE (DELTASONE) 20 MG Tab Take 3 tabs po for 2 days then 2 tabs for 2 days then 1 for 2 days (Patient not taking: Reported on 1/14/2019) 12 Tab 0     No current facility-administered medications for this visit.      She  has a past medical history of Allergic rhinitis; Back pain; Bronchitis; Chickenpox; Colitis; COPD (chronic obstructive pulmonary disease) ([facility]); Glaucoma (2/25/2010);  "Influenza; Iritis; Migraine; Nasal drainage; Neck pain; Retinopathy; Scarlet fever; and Tonsillitis. She also has no past medical history of ASTHMA; CAD (coronary artery disease); Cancer (HCC); Congestive heart failure (HCC); Diabetes; Hypertension; Infectious disease; Psychiatric disorder; Renal disorder; or Seizure disorder (HCC).  She  has a past surgical history that includes other; eye surgery; and pr remv 2nd cataract,corn-scler sectn.  Social History   Substance Use Topics   • Smoking status: Current Every Day Smoker     Packs/day: 0.25     Years: 45.00     Types: Cigarettes   • Smokeless tobacco: Never Used      Comment: using nicotine patch. quit 10/2   • Alcohol use No      Comment: celebrations only--newyears     Social History     Social History Narrative   • No narrative on file     Family History   Problem Relation Age of Onset   • Heart Failure Mother    • Heart Disease Father    • Heart Attack Father      Family Status   Relation Status   • Mo Alive   • Fa    • Bro Alive   • MGMo    • MGFa    • PGMo    • PGFa          ROS    The pertinent  ROS findings can be seen in the HPI above.     All other systems reviewed and are negative     Objective:     /76 (BP Location: Left arm, Patient Position: Sitting, BP Cuff Size: Adult)   Pulse (!) 104   Temp 36.8 °C (98.2 °F) (Temporal)   Ht 1.626 m (5' 4\")   Wt 83.6 kg (184 lb 3.2 oz)   SpO2 91%  Body mass index is 31.62 kg/m².      Physical Exam:    Constitutional: Alert, no distress.  Skin:   SKIN EXAM     Lesgeorgina Reyna - Pre-malignant   Date/Time: 2019 3:04 PM   Performed by: JANELL LOZANO   Authorized by: JANELL LOZANO     Number of Lesions: 3  Lesion 1:     Body area: head/neck    Head/neck location: forehead    Initial size (mm): 3    Final defect size (mm): 3    Malignancy: malignancy unknown      Destruction method: cryotherapy      Cryo area: 3    Cryo cycles: 3  Lesion 2: "     Body area: head/neck    Head/neck location: forehead    Initial size (mm): 2    Final defect size (mm): 2    Malignancy: malignancy unknown      Destruction method: cryotherapy      Cryo area: 2    Cryo cycles: 3  Lesion 3:     Body area: head/neck    Head/neck location: forehead    Initial size (mm): 3    Final defect size (mm): 3    Malignancy: malignancy unknown      Destruction method: cryotherapy      Cryo area: 3    Cryo cycles: 3  Lesn Destn - Benign   Date/Time: 6/11/2019 3:08 PM   Performed by: JANELL LOZANO   Authorized by: JANELL LOZANO     Number of Lesions: 2  Lesion 1:     Body area: head/neck    Head/neck location: forehead    Initial size (mm): 2    Final defect size (mm): 2    Malignancy: malignancy unknown      Destruction method: cryotherapy      Cryo area: 3    Cryo cycles: 3  Lesion 2:     Body area: head/neck    Head/neck location: forehead    Initial size (mm): 2    Final defect size (mm): 2    Malignancy: malignancy unknown      Destruction method: cryotherapy      Cryo area: 3    Cryo cycles: 3        ISK  Description--  2 irregular, pigmented, verrucous surface plaques with dried hemmorhage      Size 0.2cm -0.4 cm on forehead      AK  multiple lesions on bilateral forearms, face,hands, and chest, with evidence of of tender, pink, scaly with solar damage present , spotty hyperpigmentation, scattered telangiectasias, and  xerosis      PROCEDURE: CRYOTHERAPY  Discussed risks and benefits of cryotherapy including but not limited to scarring, hyperpigmentation, hypopigmentation, hypertrophic scarring, keiloid scarring, incomplete or no resolution of lesions treated,pain, undesirable cosemetic result, blistering, potential need for additional treatment including more invasive treatment. Patient expresses understanding and verbally acknowledges risks and consent to treatment. 2  applications of cryotherapy with 3 second freeze thaw cycle was applied to all AK's and   all irritated and inflamed Seborrheic Keratoses. Patient tolerated procedure well. There were no complications. Aftercare instructions given.    Eye: Equal, round and reactive, conjunctiva clear, lids normal.  ENMT: Lips without lesions, good dentition, oropharynx clear.  Neck: Trachea midline, no masses, no thyromegaly. No cervical or supraclavicular lymphadenopathy.  Respiratory: Unlabored respiratory effort, lungs clear to auscultation, no wheezes, no ronchi.  Cardiovascular: Normal S1, S2, no murmur, no edema  Abdomen: Soft, non-tender, no masses, no hepatosplenomegaly.  Psych: Alert and oriented x3, normal affect and mood.          Assessment and Plan:   The following treatment plan was discussed      1. Smoking greater than 30 pack years  We reviewed the benefits of smoking cessation  She states she will continue to try to stop.    2. Sciatica of left side  Patient has been stable with current management  We will make no changes for now      3. Pure hypercholesterolemia    We will obtain new labs to update clinical profile.  Then we will adjust therapy as needed.    - Lipid Profile; Future  - CBC WITHOUT DIFFERENTIAL; Future  - Comp Metabolic Panel; Future    4. AK (actinic keratosis)  Patient tolerated procedure well  There were no adverse events  Patient was given post procedure precautions       5. Inflamed seborrheic keratosis  Patient tolerated procedure well  There were no adverse events  Patient was given post procedure precautions       6. Acute on chronic respiratory failure with hypoxemia (HCC)    Up to date on vaccinations  Counseled on the following   smoking cessation  avoidance of various other inhalational particulate exposures,   healthy diet and aim to achieve a normal body mass index (BMI).  Continue LABA plus inhaled corticosteroid, LAMA, anticholinergics  Continue home oxygen    pulmonology consult pending  - Misc. Devices Misc; Please provide with home nightly oxygen 1-2L via NC  Dispense:  1 Application; Refill: 1    7. Recurrent sinusitis   we will add singulair to zyrtec  And hope this combination gives her more relief.    - cetirizine (ZYRTEC) 10 MG Tab; Take 1 Tab by mouth every day.  Dispense: 90 Tab; Refill: 1    8. Obesity    Appropriate counseling was given.      Instructed to Follow up in clinic or ER for worsening symptoms, difficulty breathing, lack of expected recovery, or should new symptoms or problems arise.    Followup: Return in about 6 months (around 12/11/2019) for Reevaluation.       Once again this medical record contains text that has been entered with the assistance of computer voice recognition and dictation software.  Therefore, it may contain unintended errors in text, spelling, punctuation, or grammar

## 2019-06-20 DIAGNOSIS — J44.9 CHRONIC OBSTRUCTIVE PULMONARY DISEASE, UNSPECIFIED COPD TYPE (HCC): ICD-10-CM

## 2019-06-21 ENCOUNTER — APPOINTMENT (OUTPATIENT)
Dept: PAIN MANAGEMENT | Facility: REHABILITATION | Age: 64
End: 2019-06-21
Attending: PAIN MEDICINE
Payer: COMMERCIAL

## 2019-06-21 RX ORDER — DILTIAZEM HYDROCHLORIDE 60 MG/1
TABLET, FILM COATED ORAL
Qty: 3 INHALER | Refills: 3 | Status: SHIPPED | OUTPATIENT
Start: 2019-06-21 | End: 2019-09-03 | Stop reason: SDUPTHER

## 2019-06-21 NOTE — TELEPHONE ENCOUNTER
Have we ever prescribed this med? Yes.  If yes, what date? 01/29/2019    Last OV: 03/05/2019 - ADELA BEEBE    Next OV: 07/02/2019 - ADELA BEEBE    DX: COPD    Medications: Symbicort

## 2019-06-28 ENCOUNTER — HOSPITAL ENCOUNTER (OUTPATIENT)
Dept: RADIOLOGY | Facility: REHABILITATION | Age: 64
End: 2019-06-28
Attending: PAIN MEDICINE

## 2019-06-28 ENCOUNTER — HOSPITAL ENCOUNTER (OUTPATIENT)
Dept: PAIN MANAGEMENT | Facility: REHABILITATION | Age: 64
End: 2019-06-28
Attending: PAIN MEDICINE
Payer: COMMERCIAL

## 2019-06-28 VITALS
SYSTOLIC BLOOD PRESSURE: 142 MMHG | WEIGHT: 185.19 LBS | OXYGEN SATURATION: 92 % | DIASTOLIC BLOOD PRESSURE: 77 MMHG | HEART RATE: 74 BPM | BODY MASS INDEX: 31.62 KG/M2 | HEIGHT: 64 IN | RESPIRATION RATE: 20 BRPM | TEMPERATURE: 99.3 F

## 2019-06-28 PROCEDURE — 700111 HCHG RX REV CODE 636 W/ 250 OVERRIDE (IP)

## 2019-06-28 PROCEDURE — 62323 NJX INTERLAMINAR LMBR/SAC: CPT

## 2019-06-28 PROCEDURE — A9585 GADOBUTROL INJECTION: HCPCS

## 2019-06-28 PROCEDURE — 700117 HCHG RX CONTRAST REV CODE 255

## 2019-06-28 RX ORDER — DEXAMETHASONE SODIUM PHOSPHATE 10 MG/ML
INJECTION, SOLUTION INTRAMUSCULAR; INTRAVENOUS
Status: COMPLETED
Start: 2019-06-28 | End: 2019-06-28

## 2019-06-28 RX ORDER — LIDOCAINE HYDROCHLORIDE 10 MG/ML
INJECTION, SOLUTION EPIDURAL; INFILTRATION; INTRACAUDAL; PERINEURAL
Status: COMPLETED
Start: 2019-06-28 | End: 2019-06-28

## 2019-06-28 RX ORDER — BETAMETHASONE SODIUM PHOSPHATE AND BETAMETHASONE ACETATE 3; 3 MG/ML; MG/ML
INJECTION, SUSPENSION INTRA-ARTICULAR; INTRALESIONAL; INTRAMUSCULAR; SOFT TISSUE
Status: COMPLETED
Start: 2019-06-28 | End: 2019-06-28

## 2019-06-28 RX ORDER — GADOBUTROL 604.72 MG/ML
INJECTION INTRAVENOUS
Status: COMPLETED
Start: 2019-06-28 | End: 2019-06-28

## 2019-06-28 RX ADMIN — LIDOCAINE HYDROCHLORIDE 5 ML: 10 INJECTION, SOLUTION EPIDURAL; INFILTRATION; INTRACAUDAL; PERINEURAL at 13:01

## 2019-06-28 RX ADMIN — LIDOCAINE HYDROCHLORIDE 5 ML: 10 INJECTION, SOLUTION EPIDURAL; INFILTRATION; INTRACAUDAL; PERINEURAL at 12:55

## 2019-06-28 RX ADMIN — DEXAMETHASONE SODIUM PHOSPHATE 10 MG: 10 INJECTION, SOLUTION INTRAMUSCULAR; INTRAVENOUS at 13:08

## 2019-06-28 RX ADMIN — GADOBUTROL 1 ML: 604.72 INJECTION INTRAVENOUS at 13:05

## 2019-06-28 NOTE — NON-PROVIDER
Pt given verbal and written d/c instructions and verbalizes understanding. denies nsaid use in last 3 days, denies blood thinners use in last 5 days, denies current infection or abx use. Med rec complete. Pt has post procedural ride home with her brother Aubrey

## 2019-06-28 NOTE — NON-PROVIDER
1251 PM  .  TIMEOUT: Patient identified, procedure confirmed, medication allergies, pertinent medical history ( Asthma, COPD ), significant patient information ( stop bang score # 1 ).  Site marked by Dr. Zollinger  . Positioned patient by FRANCIS, RN, X - ray Tech. Both lower legs & feet pillow placed for support. Hands supported on stool under head of the bed.

## 2019-06-28 NOTE — PROCEDURES
Interlaminar MASHA     Procedure: Interlaminar epidural steroid injection.     Level: L4-5, midline     Diagnosis: Lumbar radiculopathy     PROCEDURE:  The risks, benefits and alternatives of the procedure were reviewed and discussed with the patient.  Written informed consent was freely obtained. A pre-procedural time-out was conducted by Dr. Zollinger verifying the patient's identity, procedure to be performed, procedure site and side, and allergy verification. Appropriate equipment was determined to be in place for the procedure.        In the fluoroscopy suite the patient was placed in a prone position and the spine was prepped and draped in the usual sterile fashion. Using fluoroscopic visualization the L4-5 interlaminar space was identified. The skin and subcutaneous tissues overlying this point were anesthesized using 1% lidocaine. Under intermittent fluoroscopic guidance, a 20 gauge Tuohy needle was atraumatically introduced and advanced using loss of resistance to saline technique until characteristic loss of resistance was obtained. Following negative aspiration, 1.5  ml of gadolinium based contrast was injected confirming epidural placement with no vascular or intrathecal uptake. Next, following negative aspiration the following solution was injected in an incremental fashion: 3  ml of preservative-free sterile saline and 10 mg of dexamethasone. The needle was restilleted and withdrawn intact. The skin was cleaned, and a band-aid dressing was applied.       The patient tolerated the procedure well and was observed for an appropriate amount of time before being released.  The patient was discharged in good condition without any apparent complications.

## 2019-06-28 NOTE — NON-PROVIDER
1240 PM . Preparatory pre- procedure education given and understood by patient.Consent signed and H&P updated. Spoke to Dr. Zollinger patient allergic to Hydrocortisone with blisters and hives reactions, he said he used Decadron medication last time.

## 2019-07-02 ENCOUNTER — OFFICE VISIT (OUTPATIENT)
Dept: PULMONOLOGY | Facility: HOSPICE | Age: 64
End: 2019-07-02
Payer: COMMERCIAL

## 2019-07-02 VITALS
BODY MASS INDEX: 31.76 KG/M2 | HEART RATE: 106 BPM | DIASTOLIC BLOOD PRESSURE: 70 MMHG | HEIGHT: 64 IN | SYSTOLIC BLOOD PRESSURE: 122 MMHG | OXYGEN SATURATION: 92 % | WEIGHT: 186 LBS

## 2019-07-02 DIAGNOSIS — J30.9 ALLERGIC RHINITIS, UNSPECIFIED SEASONALITY, UNSPECIFIED TRIGGER: ICD-10-CM

## 2019-07-02 DIAGNOSIS — J44.9 COPD, SEVERE (HCC): ICD-10-CM

## 2019-07-02 DIAGNOSIS — F17.200 TOBACCO DEPENDENCE: ICD-10-CM

## 2019-07-02 DIAGNOSIS — G47.34 NOCTURNAL HYPOXIA: ICD-10-CM

## 2019-07-02 PROCEDURE — 99213 OFFICE O/P EST LOW 20 MIN: CPT | Performed by: PHYSICIAN ASSISTANT

## 2019-07-02 ASSESSMENT — ENCOUNTER SYMPTOMS
CLAUDICATION: 0
HEADACHES: 1
FEVER: 0
HEARTBURN: 0
WEIGHT LOSS: 0
INSOMNIA: 0
EYES NEGATIVE: 1
CHILLS: 0
SPUTUM PRODUCTION: 1
SINUS PAIN: 0
ORTHOPNEA: 0
SORE THROAT: 0
DIZZINESS: 1
WEAKNESS: 0
COUGH: 1
WHEEZING: 0
SHORTNESS OF BREATH: 1
PALPITATIONS: 0

## 2019-07-02 NOTE — PATIENT INSTRUCTIONS
1-reviewed medications, on Montelukast resume Zyrtec  2-consider Flonase if still experiencing nasal symptoms  3-complete smoking cessation strongly advised  4-overnight oximetry on 1L oxygen in 3 months   5-follow up in 6 months

## 2019-07-02 NOTE — PROGRESS NOTES
CC: Sciatic pain    HPI:  Kyra Funez is a 64 y.o. year old female here today for follow-up on COPD.  Last seen in clinic 3/5/2019.  Patient is a current smoker with a reported quarter pack per day use over 45 years.  Smoking cessation counseling given.  Patient admits to currently smoking 5 to 6 cigarettes/day and using 14 mg nicotine patch.    Reviewed in clinic vitals including 122/70, heart O2 sat of 92% on room air. Patient's body mass index is 31.93 kg/m². Exercise and nutrition counseling were performed at this visit.    As compared to prior visit patient appears confident, she is unaccompanied today.  She reports receiving epidural in her back but is still awaiting relief of symptoms.  Patient would likely benefit from pulmonary rehab but transportation remains an issue.  Requested and provided online pulmonary rehab resources.  Patient also requests overnight oximetry which she would like to complete sometime prior to next appointment and review at that appointment.  Her desire is to confirm 1 L O2 at night is sufficient.    Reviewed home medication regimen includes Symbicort, montelukast, Spiriva.  Patient has albuterol MDI for rescue use states rarely requires.  Patient had stopped taking Zyrtec but complains of increased allergy symptoms.  Advised to resume with a.m. dosing as she takes montelukast at night.    Reviewed most recent imaging which included a chest x-ray taken 10/2/2018 demonstrating a normal cardiac silhouette and mediastinal contours, no acute cardiopulmonary findings, interstitial prominence most likely related to technique.  Patient did have a 6-minute walk test on room air on 3/21/2019.  Patient did have oxygen desaturation down to 86% at 4 minutes but again declined supplemental O2.  Walks for a total distance of 400 feet which is 28% of her predicted distance.  Reports exertion limited by her back pain.    Patient did have pulmonary function testing completed 2/25/2019 which  demonstrated an FVC of 1.7 L or 54% predicted, FEV1 of 0.74 L or 30% predicted, FEV1/FVC ratio of 50% predicted.  Patient had a residual volume of 220% predicted, TLC of 124% predicted and DLCO of 60% predicted.  Per pulmonologist interpretation PFTs consistent with very severe COPD with asthmatic component, best FEV1 1.74 L or 30% predicted, hyperinflation and air trapping consistent with obstructive airway disease.  Mild gas transfer impairment consistent with emphysema. Patient did have an echo in October 2018 which per cardiology interpretation demonstrated a normal transthoracic echo with LVEF estimated 65%.      Review of Systems   Constitutional: Negative for chills, fever and weight loss.   HENT: Positive for congestion. Negative for ear pain, hearing loss, nosebleeds, sinus pain, sore throat and tinnitus.    Eyes: Negative.    Respiratory: Positive for cough (AM white production), sputum production and shortness of breath (associated with sciatic pain ). Negative for wheezing.    Cardiovascular: Positive for leg swelling. Negative for chest pain, palpitations, orthopnea and claudication.   Gastrointestinal: Negative for heartburn.   Skin: Negative.    Neurological: Positive for dizziness (occasional) and headaches (occasional ). Negative for weakness.   Psychiatric/Behavioral: The patient does not have insomnia.        Past Medical History:   Diagnosis Date   • Allergic rhinitis    • Back pain    • Bronchitis    • Chickenpox    • Colitis     e coli and salmonella   • COPD (chronic obstructive pulmonary disease) (HCC)    • Glaucoma 2/25/2010   • Influenza    • Iritis    • Migraine    • Nasal drainage    • Neck pain    • Retinopathy    • Scarlet fever    • Tonsillitis        Past Surgical History:   Procedure Laterality Date   • EYE SURGERY      multiple xiomara eye surgeries, lens implants   • OTHER      irridotomy both eyes & lense implants   • PB REMV 2ND CATARACT,CORN-SCLER SECTN         Family History  "  Problem Relation Age of Onset   • Heart Failure Mother    • Heart Disease Father    • Heart Attack Father        Social History     Social History   • Marital status: Single     Spouse name: N/A   • Number of children: N/A   • Years of education: N/A     Occupational History   • Not on file.     Social History Main Topics   • Smoking status: Current Every Day Smoker     Packs/day: 0.25     Years: 45.00     Types: Cigarettes   • Smokeless tobacco: Never Used      Comment: using nicotine patch. quit 10/2   • Alcohol use No      Comment: celebrations only--newyears   • Drug use: No   • Sexual activity: Not on file     Other Topics Concern   • Not on file     Social History Narrative   • No narrative on file       Allergies as of 07/02/2019 - Reviewed 07/02/2019   Allergen Reaction Noted   • Hydrocortisone  10/25/2018   • Iodine Shortness of Breath 12/28/2008   • Latex  10/02/2018   • Maxepa Hives 12/28/2008   • Niacin Shortness of Breath 12/28/2008   • Shellfish allergy  02/25/2010   • Tomato  10/02/2018        @Vital signs for this encounter:  Vitals:    07/02/19 1450   Height: 1.626 m (5' 4\")   Weight: 84.4 kg (186 lb)   Weight % change since last entry.: 0 %   BP: 122/70   Pulse: (!) 106   BMI (Calculated): 31.93   O2 sat % room air: 92 %       Current medications as of today   Current Outpatient Prescriptions   Medication Sig Dispense Refill   • SYMBICORT 80-4.5 MCG/ACT Aerosol INHALE 2 PUFFS BY MOUTH TWICE DAILY 3 Inhaler 3   • montelukast (SINGULAIR) 10 MG Tab Take 1 Tab by mouth every day. 90 Tab 2   • SPIRIVA HANDIHALER 18 MCG Cap INHALE 1 PUFF BY MOUTH ONCE DAILY 90 Cap 3   • nicotine (EQ NICOTINE) 14 MG/24HR PATCH 24 HR Apply 1 Patch to skin as directed every 24 hours. 30 Patch 1   • Cholecalciferol (VITAMIN D) 2000 units Cap Take 1 Cap by mouth every day.     • timolol (BETIMOL) 0.5 % ophthalmic solution Place 1 Drop in both eyes 2 times a day. use in affect     • Misc. Devices Misc Please provide with " home nightly oxygen 1-2L via NC 1 Application 1   • cetirizine (ZYRTEC) 10 MG Tab Take 1 Tab by mouth every day. (Patient not taking: Reported on 6/28/2019) 90 Tab 1   • albuterol (VENTOLIN HFA) 108 (90 Base) MCG/ACT Aero Soln inhalation aerosol Inhale 2 Puffs by mouth every 6 hours as needed for Shortness of Breath.     • predniSONE (DELTASONE) 20 MG Tab Take 3 tabs po for 2 days then 2 tabs for 2 days then 1 for 2 days (Patient not taking: Reported on 1/14/2019) 12 Tab 0   • meloxicam (MOBIC) 7.5 MG Tab Take 7.5 mg by mouth every day.       No current facility-administered medications for this visit.          Physical Exam:   Gen:           Alert and oriented, No apparent distress. Mood and affect appropriate, normal interaction with provider.  Eyes:          sclere white, conjunctive moist.  Hearing:     Grossly intact.  Dentition:    Fair dentition.  Oropharynx:   Tongue normal, posterior pharynx without erythema or exudate.  Neck:        Supple, trachea midline, no masses.  Respiratory Effort: No intercostal retractions or use of accessory muscles.   Lung Auscultation:      Slightly decreased bases otherwise clear to auscultation bilaterally; no rales, rhonchi or wheezing.  CV:            Regular rate and rhythm.  Trace pretibial edema. No murmurs, rubs or gallops.  Digits, Nails, Ext: No clubbing, cyanosis, petechiae, or nodes.   Skin:        No rashes, lesions or ulcers noted on back or exposed skin surfaces.                     Assessment:  1. COPD, severe (HCC)   no changes in regimen at this time   2. Nocturnal hypoxia  Overnight Oximetry, continue 1 L at night   3. Tobacco dependence  Tobacco Cessation Counseling 3-10 Min (68634)   4. BMI 31.0-31.9,adult  OBESITY COUNSELING (No Charge): Patient identified as having weight management issue.  Appropriate orders and counseling given.   5. Allergic rhinitis, unspecified seasonality, unspecified trigger   increased symptoms off Zyrtec, resume Zyrtec, consider  Flonase if nasal symptoms continue       Immunizations:    Flu: 10/3/2018  Pneumovax 23: Deferred  Prevnar 13: 10/15/2018    Plan:  1-reviewed medications, on Montelukast resume Zyrtec  2-consider Flonase if still experiencing nasal symptoms  3-complete smoking cessation strongly advised  4-overnight oximetry on 1L oxygen in 3 months   5-follow up in 6 months      This dictation was created using voice recognition software. The accuracy of the dictation is limited to the abilities of the software. I expect there may be some errors of grammar and possibly content.

## 2019-08-02 ENCOUNTER — HOSPITAL ENCOUNTER (EMERGENCY)
Dept: HOSPITAL 8 - ED | Age: 64
Discharge: HOME | End: 2019-08-02
Payer: COMMERCIAL

## 2019-08-02 VITALS — HEIGHT: 64 IN | WEIGHT: 185.63 LBS | BODY MASS INDEX: 31.69 KG/M2

## 2019-08-02 VITALS — DIASTOLIC BLOOD PRESSURE: 85 MMHG | SYSTOLIC BLOOD PRESSURE: 165 MMHG

## 2019-08-02 DIAGNOSIS — T78.40XA: Primary | ICD-10-CM
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
## 2019-08-02 LAB
ALBUMIN SERPL-MCNC: 3.8 G/DL (ref 3.4–5)
ANION GAP SERPL CALC-SCNC: 8 MMOL/L (ref 5–15)
BASOPHILS # BLD AUTO: 0 X10^3/UL (ref 0–0.1)
BASOPHILS NFR BLD AUTO: 0 % (ref 0–1)
CALCIUM SERPL-MCNC: 9.4 MG/DL (ref 8.5–10.1)
CHLORIDE SERPL-SCNC: 106 MMOL/L (ref 98–107)
CREAT SERPL-MCNC: 0.89 MG/DL (ref 0.55–1.02)
EOSINOPHIL # BLD AUTO: 0.44 X10^3/UL (ref 0–0.4)
EOSINOPHIL NFR BLD AUTO: 3 % (ref 1–7)
ERYTHROCYTE [DISTWIDTH] IN BLOOD BY AUTOMATED COUNT: 12.7 % (ref 9.6–15.2)
LYMPHOCYTES # BLD AUTO: 1.55 X10^3/UL (ref 1–3.4)
LYMPHOCYTES NFR BLD AUTO: 12 % (ref 22–44)
MCH RBC QN AUTO: 31 PG (ref 27–34.8)
MCHC RBC AUTO-ENTMCNC: 33.3 G/DL (ref 32.4–35.8)
MCV RBC AUTO: 93.1 FL (ref 80–100)
MD: NO
MONOCYTES # BLD AUTO: 0.37 X10^3/UL (ref 0.2–0.8)
MONOCYTES NFR BLD AUTO: 3 % (ref 2–9)
NEUTROPHILS # BLD AUTO: 10.56 X10^3/UL (ref 1.8–6.8)
NEUTROPHILS NFR BLD AUTO: 82 % (ref 42–75)
PLATELET # BLD AUTO: 358 X10^3/UL (ref 130–400)
PMV BLD AUTO: 8.1 FL (ref 7.4–10.4)
RBC # BLD AUTO: 5.27 X10^6/UL (ref 3.82–5.3)

## 2019-08-02 PROCEDURE — 99284 EMERGENCY DEPT VISIT MOD MDM: CPT

## 2019-08-02 PROCEDURE — 36415 COLL VENOUS BLD VENIPUNCTURE: CPT

## 2019-08-02 PROCEDURE — 82040 ASSAY OF SERUM ALBUMIN: CPT

## 2019-08-02 PROCEDURE — 80048 BASIC METABOLIC PNL TOTAL CA: CPT

## 2019-08-02 PROCEDURE — 85025 COMPLETE CBC W/AUTO DIFF WBC: CPT

## 2019-08-02 NOTE — NUR
PT CAME IN TODAY FOR SKIN RASH. STATES SHE HAS GOTTEN AN INJECTION WEDNESDAY 
EVENING AND HAS USED TOPICAL TREATMENT. STATES SHE HAS SEEN PAIN MANAGEMENT 
THIS AM AND THEY HAVE REQUESTED BLOOD WORK TO CHECK FOR INFECTION. PT SITTING 
ON [facility] NO S/S OF DISTRESS AT THIS TIME.

## 2019-08-28 ENCOUNTER — TELEPHONE (OUTPATIENT)
Dept: PULMONOLOGY | Facility: HOSPICE | Age: 64
End: 2019-08-28

## 2019-08-28 NOTE — TELEPHONE ENCOUNTER
DOCUMENTATION OF PRIOR AUTH STATUS    1. Medication name and dose: Spiriva Handihaler 18 mcg    2. Name and Phone # of Prescription coverage company: [employer] [phone]    3. Date Prior Auth was submitted: 8/28/2019    4. What information was given to obtain insurance decision: Clinical notes    5. Prior Auth letter Approved or Denied: Approved through 8/27/2020  6. Pharmacy notified: Yes    7. Patient notified: Yes

## 2019-08-28 NOTE — TELEPHONE ENCOUNTER
MEDICATION PRIOR AUTHORIZATION NEEDED:    1. Name of Medication: Spiriva Handihaler 18 mcg    2. Requested By (Name of Pharmacy): [facility]     3. Is insurance on file current? yes    4. What is the name & phone number of the 3rd party payor? [employer] [phone]

## 2019-08-30 ENCOUNTER — TELEPHONE (OUTPATIENT)
Dept: PULMONOLOGY | Facility: HOSPICE | Age: 64
End: 2019-08-30

## 2019-08-30 DIAGNOSIS — J44.9 CHRONIC OBSTRUCTIVE PULMONARY DISEASE, UNSPECIFIED COPD TYPE (HCC): ICD-10-CM

## 2019-08-30 NOTE — TELEPHONE ENCOUNTER
Patient called [facility] insurance has changed the beginning of July. [facility] she uses symbicort and spiriva and she is now being charged about 300 for inhalers. She was informed by insurance she must submit a prior authorization through her pharmacy because it is not on her formulary. She was given a number to [employer] that the prescribing physician should call [phone]. Can we get a prior auth started pt is requesting it be submitted to both pharmacies [facility] and [facility].    I will also be mailing assistance form

## 2019-08-30 NOTE — TELEPHONE ENCOUNTER
I submitted a P/A to [employer] and it came back stating that it was a covered drug and did not need a P/A.  I will call the pharmacy to see what the copay is.

## 2019-08-30 NOTE — TELEPHONE ENCOUNTER
According to [employer], the copay for a 90 day supply of the Symbicort is 164.94.  Spiriva is 66.00 for a 30 day supply.  The covered alternatives are Atrovent and Incruse, though she could not tell me the prices on those.  The sample pharmacy has Symbicort and Incruse if you want to go that route.  Please advise, thank you.

## 2019-08-30 NOTE — TELEPHONE ENCOUNTER
I called the patient and let her know that I got the Spiriva approved yesterday and that she should now be able to pick it up at the pharmacy.  I told her if it is still too expensive to give us a call back.  She said that the Symbicort also needs a P/A so I told her that I will work on that one too.

## 2019-09-03 RX ORDER — TIOTROPIUM BROMIDE 18 UG/1
CAPSULE ORAL; RESPIRATORY (INHALATION)
Qty: 30 CAP | Refills: 0 | Status: SHIPPED | OUTPATIENT
Start: 2019-09-03 | End: 2020-06-03

## 2019-09-03 RX ORDER — BUDESONIDE AND FORMOTEROL FUMARATE DIHYDRATE 80; 4.5 UG/1; UG/1
AEROSOL RESPIRATORY (INHALATION)
Qty: 1 INHALER | Refills: 0 | Status: SHIPPED | OUTPATIENT
Start: 2019-09-03 | End: 2020-06-23

## 2019-09-03 NOTE — TELEPHONE ENCOUNTER
Left the patient a  mssg letting her know that we sent samples to the [facility].  I asked her to call me back regarding the [facility] number.

## 2019-10-03 ENCOUNTER — HOME STUDY (OUTPATIENT)
Dept: PULMONOLOGY | Facility: HOSPICE | Age: 64
End: 2019-10-03
Attending: PHYSICIAN ASSISTANT
Payer: COMMERCIAL

## 2019-10-03 DIAGNOSIS — G47.34 NOCTURNAL HYPOXIA: ICD-10-CM

## 2019-10-03 PROCEDURE — 94762 N-INVAS EAR/PLS OXIMTRY CONT: CPT | Performed by: INTERNAL MEDICINE

## 2019-10-04 NOTE — PROCEDURES
Overnight oximetry was completed on October 3, 2019.  Study was done on 1 L of oxygen for duration of 7 hours and 27 minutes.  Maintenance of saturation was seen, no significant desaturation, no clustering and appears to show good response to 1 L of oxygen as described

## 2019-10-07 NOTE — PROGRESS NOTES
Report received from Zaida PAREDES. POC discussed. Pt resting comfortably in bed. Safety precautions in place.      Quality 226: Preventive Care And Screening: Tobacco Use: Screening And Cessation Intervention: Patient screened for tobacco and never smoked Detail Level: Detailed Quality 110: Preventive Care And Screening: Influenza Immunization: Influenza Immunization Administered during Influenza season Quality 431: Preventive Care And Screening: Unhealthy Alcohol Use - Screening: Patient screened for unhealthy alcohol use using a single question and scores less than 2 times per year

## 2020-01-15 ENCOUNTER — APPOINTMENT (OUTPATIENT)
Dept: PULMONOLOGY | Facility: HOSPICE | Age: 65
End: 2020-01-15
Payer: COMMERCIAL

## 2020-01-25 ENCOUNTER — HOSPITAL ENCOUNTER (OUTPATIENT)
Dept: LAB | Facility: MEDICAL CENTER | Age: 65
End: 2020-01-25
Attending: FAMILY MEDICINE
Payer: COMMERCIAL

## 2020-01-25 DIAGNOSIS — E78.00 PURE HYPERCHOLESTEROLEMIA: ICD-10-CM

## 2020-01-25 LAB
ALBUMIN SERPL BCP-MCNC: 3.8 G/DL (ref 3.2–4.9)
ALBUMIN/GLOB SERPL: 1.2 G/DL
ALP SERPL-CCNC: 103 U/L (ref 30–99)
ALT SERPL-CCNC: 13 U/L (ref 2–50)
ANION GAP SERPL CALC-SCNC: 8 MMOL/L (ref 0–11.9)
AST SERPL-CCNC: 14 U/L (ref 12–45)
BASOPHILS # BLD AUTO: 1 % (ref 0–1.8)
BASOPHILS # BLD: 0.07 K/UL (ref 0–0.12)
BILIRUB SERPL-MCNC: 0.5 MG/DL (ref 0.1–1.5)
BUN SERPL-MCNC: 14 MG/DL (ref 8–22)
CALCIUM SERPL-MCNC: 9.2 MG/DL (ref 8.5–10.5)
CHLORIDE SERPL-SCNC: 104 MMOL/L (ref 96–112)
CHOLEST SERPL-MCNC: 185 MG/DL (ref 100–199)
CO2 SERPL-SCNC: 30 MMOL/L (ref 20–33)
CREAT SERPL-MCNC: 0.91 MG/DL (ref 0.5–1.4)
EOSINOPHIL # BLD AUTO: 0.28 K/UL (ref 0–0.51)
EOSINOPHIL NFR BLD: 3.8 % (ref 0–6.9)
ERYTHROCYTE [DISTWIDTH] IN BLOOD BY AUTOMATED COUNT: 43.4 FL (ref 35.9–50)
FASTING STATUS PATIENT QL REPORTED: NORMAL
GLOBULIN SER CALC-MCNC: 3.2 G/DL (ref 1.9–3.5)
GLUCOSE SERPL-MCNC: 81 MG/DL (ref 65–99)
HCT VFR BLD AUTO: 48.1 % (ref 37–47)
HDLC SERPL-MCNC: 44 MG/DL
HGB BLD-MCNC: 15.1 G/DL (ref 12–16)
IMM GRANULOCYTES # BLD AUTO: 0.07 K/UL (ref 0–0.11)
IMM GRANULOCYTES NFR BLD AUTO: 1 % (ref 0–0.9)
LDLC SERPL CALC-MCNC: 118 MG/DL
LYMPHOCYTES # BLD AUTO: 1.65 K/UL (ref 1–4.8)
LYMPHOCYTES NFR BLD: 22.5 % (ref 22–41)
MCH RBC QN AUTO: 29.5 PG (ref 27–33)
MCHC RBC AUTO-ENTMCNC: 31.4 G/DL (ref 33.6–35)
MCV RBC AUTO: 94.1 FL (ref 81.4–97.8)
MONOCYTES # BLD AUTO: 0.59 K/UL (ref 0–0.85)
MONOCYTES NFR BLD AUTO: 8 % (ref 0–13.4)
NEUTROPHILS # BLD AUTO: 4.67 K/UL (ref 2–7.15)
NEUTROPHILS NFR BLD: 63.7 % (ref 44–72)
NRBC # BLD AUTO: 0 K/UL
NRBC BLD-RTO: 0 /100 WBC
PLATELET # BLD AUTO: 315 K/UL (ref 164–446)
PMV BLD AUTO: 10.5 FL (ref 9–12.9)
POTASSIUM SERPL-SCNC: 4.5 MMOL/L (ref 3.6–5.5)
PROT SERPL-MCNC: 7 G/DL (ref 6–8.2)
RBC # BLD AUTO: 5.11 M/UL (ref 4.2–5.4)
SODIUM SERPL-SCNC: 142 MMOL/L (ref 135–145)
TRIGL SERPL-MCNC: 113 MG/DL (ref 0–149)
WBC # BLD AUTO: 7.3 K/UL (ref 4.8–10.8)

## 2020-01-25 PROCEDURE — 85025 COMPLETE CBC W/AUTO DIFF WBC: CPT

## 2020-01-25 PROCEDURE — 80061 LIPID PANEL: CPT

## 2020-01-25 PROCEDURE — 80053 COMPREHEN METABOLIC PANEL: CPT

## 2020-01-25 PROCEDURE — 36415 COLL VENOUS BLD VENIPUNCTURE: CPT

## 2020-02-14 NOTE — PROGRESS NOTES
Called pt, aware that referral still pending and will get approval Monday morning, Feb 17,2020   Completed assessment; removed nicotine patch. Bed in low position, locked, and appropriate alarms set. Personal belongings and call light are within reach. Patient has no additional needs at this time.

## 2020-02-18 ENCOUNTER — TELEPHONE (OUTPATIENT)
Dept: PULMONOLOGY | Facility: HOSPICE | Age: 65
End: 2020-02-18

## 2020-02-18 NOTE — TELEPHONE ENCOUNTER
Caller:Kyra    Phone Number: [phone] (home) [phone] (work)      Message: Pt called and was asking her about her results of the OPO.  Notified pt what Dr Craven has dictated. Pt understood.  Pt wanted to see if she can go to [facility].  Told pt it would be good to discuss this with Milli Jones PA-C at her appt. Offered pt if she wanted to be seen sooner. Pt said its hard for her to get rides. She said she will wait until she sees her in 05/2020.

## 2020-02-25 ENCOUNTER — OFFICE VISIT (OUTPATIENT)
Dept: MEDICAL GROUP | Age: 65
End: 2020-02-25
Payer: COMMERCIAL

## 2020-02-25 VITALS
DIASTOLIC BLOOD PRESSURE: 70 MMHG | OXYGEN SATURATION: 93 % | SYSTOLIC BLOOD PRESSURE: 128 MMHG | HEIGHT: 64 IN | WEIGHT: 187 LBS | BODY MASS INDEX: 31.92 KG/M2 | TEMPERATURE: 97.5 F | HEART RATE: 86 BPM

## 2020-02-25 DIAGNOSIS — F17.210 SMOKING GREATER THAN 30 PACK YEARS: ICD-10-CM

## 2020-02-25 DIAGNOSIS — N95.1 MENOPAUSAL STATE: ICD-10-CM

## 2020-02-25 DIAGNOSIS — L27.0 DRUG ERUPTION: ICD-10-CM

## 2020-02-25 DIAGNOSIS — J44.1 ACUTE EXACERBATION OF CHRONIC OBSTRUCTIVE PULMONARY DISEASE (COPD) (HCC): ICD-10-CM

## 2020-02-25 DIAGNOSIS — Z78.0 POSTMENOPAUSAL STATUS (AGE-RELATED) (NATURAL): ICD-10-CM

## 2020-02-25 DIAGNOSIS — J32.0 CHRONIC MAXILLARY SINUSITIS: ICD-10-CM

## 2020-02-25 DIAGNOSIS — Z23 NEED FOR VACCINATION: ICD-10-CM

## 2020-02-25 DIAGNOSIS — R73.01 IMPAIRED FASTING GLUCOSE: ICD-10-CM

## 2020-02-25 PROCEDURE — 99214 OFFICE O/P EST MOD 30 MIN: CPT | Performed by: FAMILY MEDICINE

## 2020-02-25 RX ORDER — NICOTINE 21 MG/24HR
1 PATCH, TRANSDERMAL 24 HOURS TRANSDERMAL EVERY 24 HOURS
Qty: 30 PATCH | Refills: 0 | Status: SHIPPED | OUTPATIENT
Start: 2020-02-25 | End: 2021-01-22 | Stop reason: SDUPTHER

## 2020-02-25 RX ORDER — EPINEPHRINE 0.3 MG/.3ML
0.3 INJECTION SUBCUTANEOUS ONCE
Qty: 0.3 ML | Refills: 2 | Status: SHIPPED | OUTPATIENT
Start: 2020-02-25 | End: 2020-02-25

## 2020-02-25 RX ORDER — LORATADINE 10 MG/1
10 TABLET ORAL DAILY
Qty: 30 TAB | Refills: 2 | Status: ON HOLD | OUTPATIENT
Start: 2020-02-25 | End: 2021-07-09

## 2020-02-25 RX ORDER — DIPHENHYDRAMINE HCL 25 MG
25 CAPSULE ORAL EVERY 6 HOURS PRN
Qty: 30 CAP | Refills: 2 | Status: SHIPPED | OUTPATIENT
Start: 2020-02-25

## 2020-02-25 RX ORDER — AMOXICILLIN 875 MG/1
875 TABLET, COATED ORAL 2 TIMES DAILY
Qty: 14 TAB | Refills: 0 | Status: SHIPPED | OUTPATIENT
Start: 2020-02-25 | End: 2020-08-12

## 2020-02-25 ASSESSMENT — PATIENT HEALTH QUESTIONNAIRE - PHQ9: CLINICAL INTERPRETATION OF PHQ2 SCORE: 0

## 2020-02-25 NOTE — PROGRESS NOTES
This medical record contains text that has been entered with the assistance of computer voice recognition and dictation software. Therefore, it may contain unintended errors in text, spelling, punctuation or grammar.    Chief Complaint   Patient presents with   • Lab Results       HPI:   Kyra Funez is a 64 y.o. female here evaluation and management of:      Impaired fasting glucose    Patient has a prior history of impaired fasting glucose. Recent labs were completed on 01/25/20 as noted below. She is not exercising regularly nor follows a specific diet. She is not currently on medications to manage her glucose.     Drug eruption    The patient is followed by Dr. Zollinger who administers steroid injections for pain management. Following her last injection on 02/19/20, she developed an acute erythematous and itchy rash to her left lower back extending diffusely to her back. She was evaluated at [facility]. Her rash has gradually improved.     Acute exacerbation of chronic obstructive pulmonary disease (COPD) ([facility])  Chronic history.    The patient has a chronic history of COPD and 45 year history of smoking 0.25 pack of cigarettes per day. She is taking Symbicort, Spiriva, Singulair and Albuterol. No medication side effects were reported. Negative for recent illness, cough, shortness of breath or chest pain.    Chronic maxillary sinusitis    The patient has a chronic history of recurrent sinusitis. She has been given Amoxicillin in the past which she took a few months ago after developing a cough, nasal congestion, rhinorrhea and sinus pain. Her symptoms resolved with the antibiotic. She is requesting a refill for Amoxicillin to take for recurrent symptoms in the future.    Need for vaccination    Patient is due for the pneumovax vaccine.    Postmenopausal status (age-related) (natural)  Menopausal state    Patient is postmenopausal. She has not completed a bone density study in the past. No recent  falls or injuries.      Current medicines (including changes today)  Current Outpatient Medications   Medication Sig Dispense Refill   • EPINEPHrine (EPIPEN) 0.3 MG/0.3ML Solution Auto-injector solution for injection 0.3 mL by Intramuscular route Once for 1 dose. 0.3 mL 2   • diphenhydrAMINE (BENADRYL) 25 MG capsule Take 1 Cap by mouth every 6 hours as needed. 30 Cap 2   • loratadine (CLARITIN) 10 MG Tab Take 1 Tab by mouth every day. 30 Tab 2   • amoxicillin (AMOXIL) 875 MG tablet Take 1 Tab by mouth 2 times a day. 14 Tab 0   • nicotine (NICODERM) 21 MG/24HR PATCH 24 HR Apply 1 Patch to skin as directed every 24 hours. 30 Patch 0   • budesonide-formoterol (SYMBICORT) 80-4.5 MCG/ACT Aerosol INHALE 2 PUFFS BY MOUTH TWICE DAILY 1 Inhaler 0   • tiotropium (SPIRIVA HANDIHALER) 18 MCG Cap INHALE 1 PUFF BY MOUTH ONCE DAILY 30 Cap 0   • Misc. Devices Misc Please provide with home nightly oxygen 1-2L via NC 1 Application 1   • montelukast (SINGULAIR) 10 MG Tab Take 1 Tab by mouth every day. 90 Tab 2   • nicotine (EQ NICOTINE) 14 MG/24HR PATCH 24 HR Apply 1 Patch to skin as directed every 24 hours. 30 Patch 1   • albuterol (VENTOLIN HFA) 108 (90 Base) MCG/ACT Aero Soln inhalation aerosol Inhale 2 Puffs by mouth every 6 hours as needed for Shortness of Breath.     • Cholecalciferol (VITAMIN D) 2000 units Cap Take 1 Cap by mouth every day.     • timolol (BETIMOL) 0.5 % ophthalmic solution Place 1 Drop in both eyes 2 times a day. use in affect       No current facility-administered medications for this visit.      She  has a past medical history of Allergic rhinitis, Back pain, Bronchitis, Chickenpox, Colitis, COPD (chronic obstructive pulmonary disease) ([facility]), Glaucoma (2/25/2010), Influenza, Iritis, Migraine, Nasal drainage, Neck pain, Retinopathy, Scarlet fever, and Tonsillitis. She also has no past medical history of ASTHMA, CAD (coronary artery disease), Cancer ([facility]), Congestive heart failure ([facility]), Diabetes, Hypertension,  "Infectious disease, Psychiatric disorder, Renal disorder, or Seizure disorder (HCC).  She  has a past surgical history that includes other; eye surgery; and pr remv 2nd cataract,corn-scler sectn.  Social History     Tobacco Use   • Smoking status: Current Every Day Smoker     Packs/day: 0.25     Years: 45.00     Pack years: 11.25     Types: Cigarettes   • Smokeless tobacco: Never Used   • Tobacco comment: using nicotine patch   Substance Use Topics   • Alcohol use: No     Alcohol/week: 0.0 oz     Comment: celebrations only--newyears   • Drug use: No     Social History     Social History Narrative   • Not on file     Family History   Problem Relation Age of Onset   • Heart Failure Mother    • Heart Disease Father    • Heart Attack Father      Family Status   Relation Name Status   • Mo  Alive   • Fa age 63    • Bro x1 Alive   • MGMo     • MGFa     • PGMo     • PGFa         ROS    Please see HPI for further details.     All other systems reviewed and are negative.     Objective:     /70 (BP Location: Left arm, Patient Position: Sitting, BP Cuff Size: Adult)   Pulse 86   Temp 36.4 °C (97.5 °F) (Temporal)   Ht 1.626 m (5' 4\")   Wt 84.8 kg (187 lb)   SpO2 93%  Body mass index is 32.1 kg/m².    Physical Exam:    Constitutional: Alert, no distress.  Skin: Warm, dry, good turgor, no rashes in visible areas.  Eye: Equal, round and reactive, conjunctiva clear, lids normal.  ENMT: Lips without lesions, good dentition, oropharynx clear.  Neck: Trachea midline, no masses, no thyromegaly. No cervical or supraclavicular lymphadenopathy.  Respiratory: Decreased air entry bilaterally in all lung fields. No wheezes, no ronchi.  Cardiovascular: Normal S1, S2, no murmur, no edema.  Psych: Alert and oriented x3, normal affect and mood.      Assessment and Plan:   The following treatment plan was discussed:    1. Impaired fasting glucose    Prior history. Most recent labs dated 20 " revealed a normal fasting glucose at 81. Plan to continue monitoring.     2. Drug eruption    Recent evaluation at [facility] for a rash following a steroid injection. Plan to refer the patient back to her allergist for further evaluation. Additionally, she was given prescriptions for an Epipen, Benadryl and Claritin.     - REFERRAL TO ALLERGY    3. Acute exacerbation of chronic obstructive pulmonary disease (COPD) (HCC)    Chronic in nature. On exam, patient has decreased air entry bilaterally in all lung fields. She has a 45 year smoking history. Patient was provided an order to complete a pulmonary function test.     - PULMONARY FUNCTION TESTS -Test requested: Complete Pulmonary Function Test; Future  - EPINEPHrine (EPIPEN) 0.3 MG/0.3ML Solution Auto-injector solution for injection; 0.3 mL by Intramuscular route Once for 1 dose.  Dispense: 0.3 mL; Refill: 2  - diphenhydrAMINE (BENADRYL) 25 MG capsule; Take 1 Cap by mouth every 6 hours as needed.  Dispense: 30 Cap; Refill: 2  - loratadine (CLARITIN) 10 MG Tab; Take 1 Tab by mouth every day.  Dispense: 30 Tab; Refill: 2    4. Chronic maxillary sinusitis    Recurrent history of sinusitis. Prescription for Amoxil was given to take as needed for acute symptoms.    - amoxicillin (AMOXIL) 875 MG tablet; Take 1 Tab by mouth 2 times a day.  Dispense: 14 Tab; Refill: 0    5. Need for vaccination    Pneumovax vaccine was administered in the clinic after risks and benefits of the vaccine were discussed.     - Pneumovax Vaccine (PPSV23)    6. Postmenopausal status (age-related) (natural)    Chronic in nature. Patient was provided an order for a bone density study for further evaluation.     - DS-BONE DENSITY STUDY (DEXA)    7. Menopausal state    Chronic in nature. Patient was provided an order for a bone density study for further evaluation.     - DS-BONE DENSITY STUDY (DEXA)       HEALTH MAINTENANCE: As noted above.    Instructed to follow up in clinic or ER for worsening  symptoms, difficulty breathing, lack of expected recovery or should new symptoms or problems arise.    Follow up: Return in about 6 months (around 8/25/2020) for Reevaluation, labs.      Once again this medical record contains text that has been entered with the assistance of computer voice recognition, dictation software and medical scribes. Therefore, it may contain unintended errors in text, spelling, punctuation or grammar.    IBonnie (Scribe), am scribing for, and in the presence of, Demetri Addison M.D.    Electronically signed by: Bonnie Garcia (Scribe), 2/25/2020     IDemetri M.D. personally performed the services described in this documentation, as scribed by Bonnie Garcia in my presence, and it is both accurate and complete.

## 2020-03-25 DIAGNOSIS — J32.9 RECURRENT SINUSITIS: ICD-10-CM

## 2020-03-27 RX ORDER — CETIRIZINE HYDROCHLORIDE 10 MG/1
TABLET, FILM COATED ORAL
Qty: 90 TAB | Refills: 0 | Status: SHIPPED | OUTPATIENT
Start: 2020-03-27 | End: 2020-06-23

## 2020-03-27 RX ORDER — MONTELUKAST SODIUM 10 MG/1
TABLET ORAL
Qty: 90 TAB | Refills: 0 | Status: SHIPPED | OUTPATIENT
Start: 2020-03-27 | End: 2020-06-23

## 2020-06-03 DIAGNOSIS — J44.9 CHRONIC OBSTRUCTIVE PULMONARY DISEASE, UNSPECIFIED COPD TYPE (HCC): ICD-10-CM

## 2020-06-03 RX ORDER — TIOTROPIUM BROMIDE 18 UG/1
CAPSULE ORAL; RESPIRATORY (INHALATION)
Qty: 90 CAP | Refills: 1 | Status: SHIPPED | OUTPATIENT
Start: 2020-06-03 | End: 2020-08-27 | Stop reason: SDUPTHER

## 2020-06-03 NOTE — TELEPHONE ENCOUNTER
Have we ever prescribed this med? Yes.  If yes, what date? 09/03/19    Last OV: 07/02/2019 with Milli Jones PA-C    Next OV: 07/21/2020 with Dr Meyer    DX: Chronic obstructive pulmonary disease, unspecified COPD type (HCC) (J44.9)     Medications:   Requested Prescriptions     Pending Prescriptions Disp Refills   • tiotropium (SPIRIVA HANDIHALER) 18 MCG Cap [Pharmacy Med Name: Spiriva HandiHaler 18 MCG Inhalation Capsule] 90 Cap 0     Sig: Inhale 1 puff by mouth once daily

## 2020-06-23 DIAGNOSIS — J32.9 RECURRENT SINUSITIS: ICD-10-CM

## 2020-06-23 DIAGNOSIS — J44.9 CHRONIC OBSTRUCTIVE PULMONARY DISEASE, UNSPECIFIED COPD TYPE (HCC): ICD-10-CM

## 2020-06-23 RX ORDER — MONTELUKAST SODIUM 10 MG/1
TABLET ORAL
Qty: 90 TAB | Refills: 0 | Status: SHIPPED | OUTPATIENT
Start: 2020-06-23 | End: 2020-09-17

## 2020-06-23 RX ORDER — CETIRIZINE HYDROCHLORIDE 10 MG/1
TABLET, FILM COATED ORAL
Qty: 90 TAB | Refills: 0 | Status: SHIPPED | OUTPATIENT
Start: 2020-06-23 | End: 2020-09-17

## 2020-06-23 RX ORDER — DILTIAZEM HYDROCHLORIDE 60 MG/1
TABLET, FILM COATED ORAL
Qty: 1 INHALER | Refills: 2 | Status: SHIPPED | OUTPATIENT
Start: 2020-06-23 | End: 2020-08-27 | Stop reason: SDUPTHER

## 2020-06-23 NOTE — TELEPHONE ENCOUNTER
Have we ever prescribed this med? Yes.  If yes, what date?      Last OV: 07/02/2019- SUSAN Jonse     Next OV: 07/21/2020- Dr. Meyer     DX: Chronic obstructive pulmonary disease, unspecified COPD type (HCC) (J44.9)     Medications:   Requested Prescriptions     Pending Prescriptions Disp Refills   • SYMBICORT 80-4.5 MCG/ACT Aerosol [Pharmacy Med Name: Symbicort 80-4.5 MCG/ACT Inhalation Aerosol] 33 g 0     Sig: Inhale 2 puffs by mouth twice daily

## 2020-07-21 ENCOUNTER — TELEMEDICINE (OUTPATIENT)
Dept: PULMONOLOGY | Facility: HOSPICE | Age: 65
End: 2020-07-21
Payer: COMMERCIAL

## 2020-07-21 DIAGNOSIS — J96.11 CHRONIC RESPIRATORY FAILURE WITH HYPOXIA (HCC): ICD-10-CM

## 2020-07-21 DIAGNOSIS — J44.9 CHRONIC OBSTRUCTIVE PULMONARY DISEASE, UNSPECIFIED COPD TYPE (HCC): ICD-10-CM

## 2020-07-21 DIAGNOSIS — Z72.0 TOBACCO ABUSE: ICD-10-CM

## 2020-07-21 PROCEDURE — 99214 OFFICE O/P EST MOD 30 MIN: CPT | Mod: 95,CR | Performed by: INTERNAL MEDICINE

## 2020-07-21 NOTE — PROGRESS NOTES
"Telemedicine Visit: Established Patient     This encounter was conducted via [employer].  Verbal consent was obtained. Patient's identity was verified.    Subjective:   CC: COPD    Kyra Funez is a 65 y.o. female presenting for evaluation and management of COPD.  She continues to smoke and has been using nicotine patch to cut back.  Former pulmonary function showed FEV1 1.74 L or 30% consistent with severe COPD.  She is compliant with Spiriva, Symbicort and Singulair.  Her exertional dyspnea has been stable.  Denies sputum purulence, chest pain, edema.  She denies AECOPD over the past 6 months.  She has difficulty walking due to back pain and her lack of mobility affects her breathing.  She has been unable to obtain her epidural in recent months.  She has been working from home due to [employer] precautions.  She uses nighttime oxygen at 1 L/min.  Overnight oximetry on 1 L showed sufficient oxygen saturations.  She is due for oxygen requalification.        ROS   Denies any recent fevers or chills. No nausea or vomiting. No chest pains     Allergies   Allergen Reactions   • Hydrocortisone      Hives/blisters   • Iodine Shortness of Breath   • Latex      \"was told from her dentist, not sure reaction\"   Paper tape okay   • Maxepa Hives   • Niacin Shortness of Breath   • Shellfish Allergy    • Tomato        Current medicines (including changes today)  Current Outpatient Medications   Medication Sig Dispense Refill   • montelukast (SINGULAIR) 10 MG Tab Take 1 tablet by mouth once daily 90 Tab 0   • EQ ALLERGY RELIEF, CETIRIZINE, 10 MG Tab Take 1 tablet by mouth once daily 90 Tab 0   • SYMBICORT 80-4.5 MCG/ACT Aerosol Inhale 2 puffs by mouth twice daily 1 Inhaler 2   • tiotropium (SPIRIVA HANDIHALER) 18 MCG Cap Inhale 1 puff by mouth once daily 90 Cap 1   • diphenhydrAMINE (BENADRYL) 25 MG capsule Take 1 Cap by mouth every 6 hours as needed. 30 Cap 2   • loratadine (CLARITIN) 10 MG Tab Take 1 Tab by mouth every day. " 30 Tab 2   • amoxicillin (AMOXIL) 875 MG tablet Take 1 Tab by mouth 2 times a day. 14 Tab 0   • nicotine (NICODERM) 21 MG/24HR PATCH 24 HR Apply 1 Patch to skin as directed every 24 hours. 30 Patch 0   • Misc. Devices Misc Please provide with home nightly oxygen 1-2L via NC 1 Application 1   • nicotine (EQ NICOTINE) 14 MG/24HR PATCH 24 HR Apply 1 Patch to skin as directed every 24 hours. 30 Patch 1   • albuterol (VENTOLIN HFA) 108 (90 Base) MCG/ACT Aero Soln inhalation aerosol Inhale 2 Puffs by mouth every 6 hours as needed for Shortness of Breath.     • Cholecalciferol (VITAMIN D) 2000 units Cap Take 1 Cap by mouth every day.     • timolol (BETIMOL) 0.5 % ophthalmic solution Place 1 Drop in both eyes 2 times a day. use in affect       No current facility-administered medications for this visit.        Patient Active Problem List    Diagnosis Date Noted   • Acute on chronic respiratory failure with hypoxemia ([facility]) 10/02/2018     Priority: High   • Persistent migraine aura with cerebral infarction ([facility])      Priority: High     Class: Chronic   • Vitamin D deficiency 03/19/2010     Priority: Medium     Class: Chronic   • Drug eruption 02/25/2020   • Chronic maxillary sinusitis 02/25/2020   • Pure hypercholesterolemia 06/11/2019   • Obesity (BMI 30-39.9) 06/11/2019   • Polycythemia 10/05/2018   • Acute exacerbation of chronic obstructive pulmonary disease (COPD) ([facility]) 10/02/2018   • Smoking greater than 30 pack years 06/19/2018   • Elevated BP without diagnosis of hypertension 03/27/2018   • Recurrent sinusitis 03/27/2018   • Visit for screening mammogram 03/27/2018   • Inflamed seborrheic keratosis 03/27/2018   • AK (actinic keratosis) 03/27/2018   • Bronchitis 06/24/2016   • Leg pain, posterior 06/19/2015   • Sciatica of left side 03/13/2015   • Hyperlipidemia 03/19/2010   • Glaucoma 02/25/2010   • Blindness, one eye 02/25/2010   • Iritis 02/25/2010   • Preventative health care 02/25/2010   • Retinopathy    • Neck  pain        Family History   Problem Relation Age of Onset   • Heart Failure Mother    • Heart Disease Father    • Heart Attack Father        She  has a past medical history of Allergic rhinitis, Back pain, Bronchitis, Chickenpox, Colitis, COPD (chronic obstructive pulmonary disease) ([facility]), Glaucoma (2/25/2010), Influenza, Iritis, Migraine, Nasal drainage, Neck pain, Retinopathy, Scarlet fever, and Tonsillitis. She also has no past medical history of ASTHMA, CAD (coronary artery disease), Cancer ([facility]), Congestive heart failure ([facility]), Diabetes, Hypertension, Infectious disease, Psychiatric disorder, Renal disorder, or Seizure disorder ([facility]).  She  has a past surgical history that includes other; eye surgery; and pr remv 2nd cataract,corn-scler sectn.       Objective:   LMP  (LMP Unknown)     Physical Exam:  Constitutional: Alert, no distress, well-groomed.  Skin: No rashes in visible areas.  Eye: Round. Conjunctiva clear, lids normal. No icterus.   ENMT: Lips pink without lesions, good dentition, moist mucous membranes. Phonation normal.  Neck: No masses, no thyromegaly. Moves freely without pain.  CV: Pulse as reported by patient  Respiratory: Unlabored respiratory effort, no cough or audible wheeze  Psych: Alert and oriented x3, normal affect and mood.       Assessment and Plan:   The following treatment plan was discussed:     1. Chronic obstructive pulmonary disease, unspecified COPD type (HCC)  - Overnight Oximetry; Future    2. Tobacco abuse    3. Chronic respiratory failure with hypoxia ([facility])    The patient has severe COPD with ongoing tobacco use.  Smoking cessation counseling provided-she was encouraged to continue with nicotine patch.  Obtain overnight oximetry on room air for annual requalification of oxygen.  Maintain oxygen at 1 L/min nocturnally.  Continue Symbicort and Spiriva inhalers.  Follow-up: Return in about 6 months (around 1/21/2021).

## 2020-07-21 NOTE — H&P (VIEW-ONLY)
"Telemedicine Visit: Established Patient     This encounter was conducted via [employer].  Verbal consent was obtained. Patient's identity was verified.    Subjective:   CC: COPD    Kyra Funez is a 65 y.o. female presenting for evaluation and management of COPD.  She continues to smoke and has been using nicotine patch to cut back.  Former pulmonary function showed FEV1 1.74 L or 30% consistent with severe COPD.  She is compliant with Spiriva, Symbicort and Singulair.  Her exertional dyspnea has been stable.  Denies sputum purulence, chest pain, edema.  She denies AECOPD over the past 6 months.  She has difficulty walking due to back pain and her lack of mobility affects her breathing.  She has been unable to obtain her epidural in recent months.  She has been working from home due to COVID-[employer] precautions.  She uses nighttime oxygen at 1 L/min.  Overnight oximetry on 1 L showed sufficient oxygen saturations.  She is due for oxygen requalification.        ROS   Denies any recent fevers or chills. No nausea or vomiting. No chest pains     Allergies   Allergen Reactions   • Hydrocortisone      Hives/blisters   • Iodine Shortness of Breath   • Latex      \"was told from her dentist, not sure reaction\"   Paper tape okay   • Maxepa Hives   • Niacin Shortness of Breath   • Shellfish Allergy    • Tomato        Current medicines (including changes today)  Current Outpatient Medications   Medication Sig Dispense Refill   • montelukast (SINGULAIR) 10 MG Tab Take 1 tablet by mouth once daily 90 Tab 0   • EQ ALLERGY RELIEF, CETIRIZINE, 10 MG Tab Take 1 tablet by mouth once daily 90 Tab 0   • SYMBICORT 80-4.5 MCG/ACT Aerosol Inhale 2 puffs by mouth twice daily 1 Inhaler 2   • tiotropium (SPIRIVA HANDIHALER) 18 MCG Cap Inhale 1 puff by mouth once daily 90 Cap 1   • diphenhydrAMINE (BENADRYL) 25 MG capsule Take 1 Cap by mouth every 6 hours as needed. 30 Cap 2   • loratadine (CLARITIN) 10 MG Tab Take 1 Tab by mouth every day. " 30 Tab 2   • amoxicillin (AMOXIL) 875 MG tablet Take 1 Tab by mouth 2 times a day. 14 Tab 0   • nicotine (NICODERM) 21 MG/24HR PATCH 24 HR Apply 1 Patch to skin as directed every 24 hours. 30 Patch 0   • Misc. Devices Misc Please provide with home nightly oxygen 1-2L via NC 1 Application 1   • nicotine (EQ NICOTINE) 14 MG/24HR PATCH 24 HR Apply 1 Patch to skin as directed every 24 hours. 30 Patch 1   • albuterol (VENTOLIN HFA) 108 (90 Base) MCG/ACT Aero Soln inhalation aerosol Inhale 2 Puffs by mouth every 6 hours as needed for Shortness of Breath.     • Cholecalciferol (VITAMIN D) 2000 units Cap Take 1 Cap by mouth every day.     • timolol (BETIMOL) 0.5 % ophthalmic solution Place 1 Drop in both eyes 2 times a day. use in affect       No current facility-administered medications for this visit.        Patient Active Problem List    Diagnosis Date Noted   • Acute on chronic respiratory failure with hypoxemia ([facility]) 10/02/2018     Priority: High   • Persistent migraine aura with cerebral infarction ([facility])      Priority: High     Class: Chronic   • Vitamin D deficiency 03/19/2010     Priority: Medium     Class: Chronic   • Drug eruption 02/25/2020   • Chronic maxillary sinusitis 02/25/2020   • Pure hypercholesterolemia 06/11/2019   • Obesity (BMI 30-39.9) 06/11/2019   • Polycythemia 10/05/2018   • Acute exacerbation of chronic obstructive pulmonary disease (COPD) ([facility]) 10/02/2018   • Smoking greater than 30 pack years 06/19/2018   • Elevated BP without diagnosis of hypertension 03/27/2018   • Recurrent sinusitis 03/27/2018   • Visit for screening mammogram 03/27/2018   • Inflamed seborrheic keratosis 03/27/2018   • AK (actinic keratosis) 03/27/2018   • Bronchitis 06/24/2016   • Leg pain, posterior 06/19/2015   • Sciatica of left side 03/13/2015   • Hyperlipidemia 03/19/2010   • Glaucoma 02/25/2010   • Blindness, one eye 02/25/2010   • Iritis 02/25/2010   • Preventative health care 02/25/2010   • Retinopathy    • Neck  pain        Family History   Problem Relation Age of Onset   • Heart Failure Mother    • Heart Disease Father    • Heart Attack Father        She  has a past medical history of Allergic rhinitis, Back pain, Bronchitis, Chickenpox, Colitis, COPD (chronic obstructive pulmonary disease) ([facility]), Glaucoma (2/25/2010), Influenza, Iritis, Migraine, Nasal drainage, Neck pain, Retinopathy, Scarlet fever, and Tonsillitis. She also has no past medical history of ASTHMA, CAD (coronary artery disease), Cancer ([facility]), Congestive heart failure ([facility]), Diabetes, Hypertension, Infectious disease, Psychiatric disorder, Renal disorder, or Seizure disorder ([facility]).  She  has a past surgical history that includes other; eye surgery; and pr remv 2nd cataract,corn-scler sectn.       Objective:   LMP  (LMP Unknown)     Physical Exam:  Constitutional: Alert, no distress, well-groomed.  Skin: No rashes in visible areas.  Eye: Round. Conjunctiva clear, lids normal. No icterus.   ENMT: Lips pink without lesions, good dentition, moist mucous membranes. Phonation normal.  Neck: No masses, no thyromegaly. Moves freely without pain.  CV: Pulse as reported by patient  Respiratory: Unlabored respiratory effort, no cough or audible wheeze  Psych: Alert and oriented x3, normal affect and mood.       Assessment and Plan:   The following treatment plan was discussed:     1. Chronic obstructive pulmonary disease, unspecified COPD type (HCC)  - Overnight Oximetry; Future    2. Tobacco abuse    3. Chronic respiratory failure with hypoxia ([facility])    The patient has severe COPD with ongoing tobacco use.  Smoking cessation counseling provided-she was encouraged to continue with nicotine patch.  Obtain overnight oximetry on room air for annual requalification of oxygen.  Maintain oxygen at 1 L/min nocturnally.  Continue Symbicort and Spiriva inhalers.  Follow-up: Return in about 6 months (around 1/21/2021).

## 2020-08-12 NOTE — PREPROCEDURE INSTRUCTIONS
PAT call made 8/12/2020 at approx 13:15, spoke with pt after confirming 2 pt identifiers. Health hx, medications, allergies  reviewed with pt, as well as pre-procedure/sedation instructions. Respiratory screen completed. Pt denies being sick/symptomatic (fever, cough, SOB, diarrhea) w/in last 72 hrs, or having close contact w/ sick individuals in the past 2 wks. Pt education to notify her MD should she become symptomatic prior to procedure. Pt verbalizes understanding. Pt told to bring mask and the she will be wearing it entire stay. Informed pt it is recommended pt self isolate for 72 hrs or from time of this call until procedure, also the their  needs to remain on site in vehicle until pt is discharged. Pt verbalizes understanding.

## 2020-08-13 ENCOUNTER — TELEPHONE (OUTPATIENT)
Dept: PULMONOLOGY | Facility: HOSPICE | Age: 65
End: 2020-08-13

## 2020-08-13 NOTE — TELEPHONE ENCOUNTER
DOCUMENTATION OF PRIOR AUTH STATUS    1. Medication name and dose: Spiriva Handihaler 18 mcg    2. Name and Phone # of Prescription coverage company: [employer] [phone]    3. Date Prior Auth was submitted: 8/13/2020    4. What information was given to obtain insurance decision: Clinical notes    5. Prior Auth letter Approved or Denied: Approved, no P/A is required.    6. Pharmacy notified: Yes    7. Patient notified: Yes

## 2020-08-13 NOTE — TELEPHONE ENCOUNTER
MEDICATION PRIOR AUTHORIZATION NEEDED:    1. Name of Medication: Spiriva Handihaler 18 mcg    2. Requested By (Name of Pharmacy): [facility]     3. Is insurance on file current? yes    4. What is the name & phone number of the 3rd party payor? [employer] [phone]

## 2020-08-14 ENCOUNTER — APPOINTMENT (OUTPATIENT)
Dept: RADIOLOGY | Facility: REHABILITATION | Age: 65
End: 2020-08-14
Attending: PHYSICAL MEDICINE & REHABILITATION
Payer: COMMERCIAL

## 2020-08-14 ENCOUNTER — HOSPITAL ENCOUNTER (OUTPATIENT)
Facility: REHABILITATION | Age: 65
End: 2020-08-14
Attending: PHYSICAL MEDICINE & REHABILITATION | Admitting: PHYSICAL MEDICINE & REHABILITATION
Payer: COMMERCIAL

## 2020-08-14 VITALS
RESPIRATION RATE: 18 BRPM | SYSTOLIC BLOOD PRESSURE: 163 MMHG | BODY MASS INDEX: 30.79 KG/M2 | HEIGHT: 64 IN | DIASTOLIC BLOOD PRESSURE: 92 MMHG | HEART RATE: 97 BPM | OXYGEN SATURATION: 90 % | WEIGHT: 180.34 LBS | TEMPERATURE: 98.7 F

## 2020-08-14 PROCEDURE — A9585 GADOBUTROL INJECTION: HCPCS

## 2020-08-14 PROCEDURE — 700111 HCHG RX REV CODE 636 W/ 250 OVERRIDE (IP)

## 2020-08-14 PROCEDURE — G0260 INJ FOR SACROILIAC JT ANESTH: HCPCS

## 2020-08-14 PROCEDURE — 700117 HCHG RX CONTRAST REV CODE 255

## 2020-08-14 PROCEDURE — 700101 HCHG RX REV CODE 250

## 2020-08-14 RX ORDER — LIDOCAINE HYDROCHLORIDE 20 MG/ML
INJECTION, SOLUTION EPIDURAL; INFILTRATION; INTRACAUDAL; PERINEURAL
Status: COMPLETED
Start: 2020-08-14 | End: 2020-08-14

## 2020-08-14 RX ORDER — BUPIVACAINE HYDROCHLORIDE 5 MG/ML
INJECTION, SOLUTION EPIDURAL; INTRACAUDAL
Status: COMPLETED
Start: 2020-08-14 | End: 2020-08-14

## 2020-08-14 RX ORDER — LIDOCAINE HYDROCHLORIDE 10 MG/ML
INJECTION, SOLUTION EPIDURAL; INFILTRATION; INTRACAUDAL; PERINEURAL
Status: COMPLETED
Start: 2020-08-14 | End: 2020-08-14

## 2020-08-14 RX ORDER — TRIAMCINOLONE ACETONIDE 40 MG/ML
INJECTION, SUSPENSION INTRA-ARTICULAR; INTRAMUSCULAR
Status: COMPLETED
Start: 2020-08-14 | End: 2020-08-14

## 2020-08-14 RX ORDER — GADOBUTROL 604.72 MG/ML
INJECTION INTRAVENOUS
Status: COMPLETED
Start: 2020-08-14 | End: 2020-08-14

## 2020-08-14 RX ORDER — METHYLPREDNISOLONE ACETATE 80 MG/ML
INJECTION, SUSPENSION INTRA-ARTICULAR; INTRALESIONAL; INTRAMUSCULAR; SOFT TISSUE
Status: COMPLETED
Start: 2020-08-14 | End: 2020-08-14

## 2020-08-14 RX ADMIN — TRIAMCINOLONE ACETONIDE 40 MG: 40 INJECTION, SUSPENSION INTRA-ARTICULAR; INTRAMUSCULAR at 14:09

## 2020-08-14 RX ADMIN — LIDOCAINE HYDROCHLORIDE 5 ML: 10 INJECTION, SOLUTION EPIDURAL; INFILTRATION; INTRACAUDAL; PERINEURAL at 14:05

## 2020-08-14 RX ADMIN — LIDOCAINE HYDROCHLORIDE 5 ML: 20 INJECTION, SOLUTION EPIDURAL; INFILTRATION; INTRACAUDAL; PERINEURAL at 14:09

## 2020-08-14 RX ADMIN — GADOBUTROL 3 ML: 604.72 INJECTION INTRAVENOUS at 14:09

## 2020-08-14 ASSESSMENT — FIBROSIS 4 INDEX: FIB4 SCORE: 0.83

## 2020-08-14 NOTE — PROGRESS NOTES
Preprocedure assessment completed.  Pertinent health information COPD, HTN, communicated to physician and staff during time out.  Patient positioned preprocedure by RN, CST, and XRAY.  Pillow under ankles for support.

## 2020-08-14 NOTE — NON-PROVIDER
Pt given verbal and written d/c instructions including verbal infection prevention information and s/s of post procedure infection and verbalizes understanding. denies nsaid use in last 3 days, denies blood thinners use in last 5 days, denies current infection or abx use. Med rec complete. Pt left pupil fixed and dilated at base line and since childhood per pt. Hx COPD. Pt has post procedural ride home with her brother Melvin.

## 2020-08-14 NOTE — OR SURGEON
Immediate Post OP Note    PreOp Diagnosis: Left sacroiliitis    PostOp Diagnosis: Same    Procedure(s):  INJECTION, THERAPEUTIC AGENT, SACROILIAC JOINT - Wound Class: Clean    Surgeon(s):  Mohinder Moore M.D.    Anesthesiologist/Type of Anesthesia:  No anesthesia staff entered./Local    Surgical Staff:  Circulator: Willa Zuniga R.N.  Scrub Person: Esmer YANG [facility]  Radiology Technologist: Viktor Adam    Specimens removed if any:  * No specimens in log *    Estimated Blood Loss: None    Findings: None    Complications: None        8/14/2020 2:17 PM Mohinder Moore M.D.

## 2020-08-14 NOTE — PROCEDURES
Sacroiliac Joint Injection*  SIDE: Left         PRE PROCEDURE DIAGNOSIS: Sacroiliac pain/dysfunction  POST PROCEDURE DIAGNOSIS: Same    ANESTHESIA: Local           The patient was interviewed and the medical record reviewed.  There were no medical, pharmacologic, radiographic or other structural contraindications to attempting fluoroscopically guided sacroiliac joint injection.  Risks and expected side effects (including, but not limited to, potential for failure of the procedure or worsening of pain, bleeding, infection, nerve injury, systemic reaction to the contrast, anesthetic, or steroid, anaphylactic shock, and elevation of blood sugar) as well as potential benefit of the procedure were reviewed with the patient and all concerns addressed.  The printed consent form was signed and witnessed. A standard time-out procedure was performed.         DESCRIPTION OF PROCEDURE:  The patient was brought to the fluoroscopy suite and placed in the prone position on the fluoroscopy table. The skin entry point for approaching the above documented SI joint was identified under the most advantageous fluoroscopic view and marked.  Following thorough Hibiclens preparation of the skin, sterile draping, and infiltration of the skin entry point and subcutaneous tissues with 1% Lidocaine, a 22 gauge spinal needle was placed under fluoroscopic guidance into the SI joint.  Intra-articular placement was confirmed by a clear arthrogram resulting from the injection of 0.25 ml Gadavist contrast material. A 5cc volume of solution containing 4cc 2% Lidocaine and 1cc Kenalog (40 mg/mL) was injected into the left sacroiliac joint. The stylet was replaced and the needle withdrawn.         Post-procedural instructions were provided and follow up plans discussed. The patient was instructed to contact the office if any problems any possible injection related problems or side effects were experienced. After having met discharge criteria, the  patient was discharged in good condition.          Comments: No complications

## 2020-08-27 DIAGNOSIS — J44.9 CHRONIC OBSTRUCTIVE PULMONARY DISEASE, UNSPECIFIED COPD TYPE (HCC): ICD-10-CM

## 2020-08-27 RX ORDER — DILTIAZEM HYDROCHLORIDE 60 MG/1
TABLET, FILM COATED ORAL
Qty: 3 EACH | Refills: 3 | Status: SHIPPED | OUTPATIENT
Start: 2020-08-27 | End: 2021-01-22

## 2020-08-27 RX ORDER — TIOTROPIUM BROMIDE 18 UG/1
CAPSULE ORAL; RESPIRATORY (INHALATION)
Qty: 90 CAP | Refills: 3 | Status: SHIPPED | OUTPATIENT
Start: 2020-08-27 | End: 2021-08-09 | Stop reason: SDUPTHER

## 2020-08-27 RX ORDER — DILTIAZEM HYDROCHLORIDE 60 MG/1
TABLET, FILM COATED ORAL
Qty: 1 EACH | Refills: 5 | Status: SHIPPED | OUTPATIENT
Start: 2020-08-27 | End: 2020-08-27 | Stop reason: SDUPTHER

## 2020-08-27 NOTE — TELEPHONE ENCOUNTER
Have we ever prescribed this med? Yes.  If yes, what date? 06/23/2020    Last OV: 07/21/2020 - Dr. Meyer    Next OV: not due for 6 months     DX: COPD    Medications: Symbicort

## 2020-08-27 NOTE — TELEPHONE ENCOUNTER
The patient called and asked for a 90 day supply with three refills for her Symbicort and Spiriva.  It is much cheaper that way.  I told her would send to her pharmacy.

## 2020-09-16 DIAGNOSIS — J32.9 RECURRENT SINUSITIS: ICD-10-CM

## 2020-09-17 RX ORDER — MONTELUKAST SODIUM 10 MG/1
TABLET ORAL
Qty: 90 TAB | Refills: 0 | Status: SHIPPED | OUTPATIENT
Start: 2020-09-17 | End: 2021-01-22 | Stop reason: SDUPTHER

## 2020-09-17 RX ORDER — CETIRIZINE HYDROCHLORIDE 10 MG/1
TABLET, FILM COATED ORAL
Qty: 90 TAB | Refills: 0 | Status: SHIPPED | OUTPATIENT
Start: 2020-09-17

## 2020-12-11 ENCOUNTER — APPOINTMENT (OUTPATIENT)
Dept: RADIOLOGY | Facility: REHABILITATION | Age: 65
End: 2020-12-11
Attending: PHYSICAL MEDICINE & REHABILITATION
Payer: COMMERCIAL

## 2020-12-11 ENCOUNTER — HOSPITAL ENCOUNTER (OUTPATIENT)
Facility: REHABILITATION | Age: 65
End: 2020-12-11
Attending: PHYSICAL MEDICINE & REHABILITATION | Admitting: PHYSICAL MEDICINE & REHABILITATION
Payer: COMMERCIAL

## 2020-12-11 VITALS
RESPIRATION RATE: 16 BRPM | OXYGEN SATURATION: 94 % | WEIGHT: 182.54 LBS | BODY MASS INDEX: 31.16 KG/M2 | SYSTOLIC BLOOD PRESSURE: 145 MMHG | TEMPERATURE: 97.2 F | HEIGHT: 64 IN | DIASTOLIC BLOOD PRESSURE: 88 MMHG | HEART RATE: 106 BPM

## 2020-12-11 PROCEDURE — 700117 HCHG RX CONTRAST REV CODE 255

## 2020-12-11 PROCEDURE — 700111 HCHG RX REV CODE 636 W/ 250 OVERRIDE (IP)

## 2020-12-11 PROCEDURE — A9585 GADOBUTROL INJECTION: HCPCS

## 2020-12-11 PROCEDURE — 64483 NJX AA&/STRD TFRM EPI L/S 1: CPT

## 2020-12-11 PROCEDURE — 700101 HCHG RX REV CODE 250

## 2020-12-11 PROCEDURE — 64484 NJX AA&/STRD TFRM EPI L/S EA: CPT

## 2020-12-11 RX ORDER — ONDANSETRON 2 MG/ML
4 INJECTION INTRAMUSCULAR; INTRAVENOUS
Status: DISCONTINUED | OUTPATIENT
Start: 2020-12-11 | End: 2020-12-11 | Stop reason: HOSPADM

## 2020-12-11 RX ORDER — BUPIVACAINE HYDROCHLORIDE 5 MG/ML
INJECTION, SOLUTION EPIDURAL; INTRACAUDAL
Status: COMPLETED
Start: 2020-12-11 | End: 2020-12-11

## 2020-12-11 RX ORDER — METHYLPREDNISOLONE ACETATE 80 MG/ML
INJECTION, SUSPENSION INTRA-ARTICULAR; INTRALESIONAL; INTRAMUSCULAR; SOFT TISSUE
Status: COMPLETED
Start: 2020-12-11 | End: 2020-12-11

## 2020-12-11 RX ORDER — GADOBUTROL 604.72 MG/ML
INJECTION INTRAVENOUS
Status: COMPLETED
Start: 2020-12-11 | End: 2020-12-11

## 2020-12-11 RX ORDER — LIDOCAINE HYDROCHLORIDE 10 MG/ML
INJECTION, SOLUTION EPIDURAL; INFILTRATION; INTRACAUDAL; PERINEURAL
Status: COMPLETED
Start: 2020-12-11 | End: 2020-12-11

## 2020-12-11 RX ORDER — LIDOCAINE HYDROCHLORIDE 20 MG/ML
INJECTION, SOLUTION EPIDURAL; INFILTRATION; INTRACAUDAL; PERINEURAL
Status: COMPLETED
Start: 2020-12-11 | End: 2020-12-11

## 2020-12-11 RX ADMIN — METHYLPREDNISOLONE ACETATE 80 MG: 80 INJECTION, SUSPENSION INTRA-ARTICULAR; INTRALESIONAL; INTRAMUSCULAR; SOFT TISSUE at 15:49

## 2020-12-11 RX ADMIN — GADOBUTROL 5 ML: 604.72 INJECTION INTRAVENOUS at 15:49

## 2020-12-11 RX ADMIN — BUPIVACAINE HYDROCHLORIDE 10 ML: 5 INJECTION, SOLUTION EPIDURAL; INTRACAUDAL at 15:47

## 2020-12-11 ASSESSMENT — PAIN DESCRIPTION - PAIN TYPE: TYPE: CHRONIC PAIN

## 2020-12-11 ASSESSMENT — FIBROSIS 4 INDEX: FIB4 SCORE: 0.83

## 2020-12-11 NOTE — OP REPORT
Selective Nerve Root Block/Transforaminal Epidural Injection  LEVEL(S):  L4-5    SIDE: Left         PRE-PROCEDURE DIAGNOSIS: Intervebral disc displacement/degeneration  POST-PROCEDURE DIAGNOSIS: Same    ANESTHESIA: Local         The patient was interviewed and the medical record reviewed.  There were no medical, pharmacologic, radiographic or other structural contraindications to attempting fluoroscopically guided selective nerve root block/transforaminal epidural steroid injection.  Risks and expected side effects (including, but not limited to, potential for failure of the procedure or worsening of pain, bleeding, infection, nerve injury, dural tear with spinal fluid leak, systemic reaction to the contrast, anesthetic, or steroid, anaphylactic shock, and elevation of blood sugar) as well as potential benefit of the procedure were reviewed with the patient and all concerns addressed.  The printed consent form was signed and witnessed. A standard time-out procedure was performed.         DESCRIPTION OF PROCEDURE:  The patient was placed in the prone position on the fluoroscopy table. A Hibiclens prep was performed in a wide area over the lumbar region and sterile drapes were applied. Fluoroscopy was utilized to identify the above documented neural foramen/foramina.  Local anesthesia was obtained with 0.5% bupivacaine. A 22 gauge curved tip spinal needle was then inserted, advanced with fluoroscopic guidance into each neural foramen, confirmed on the lateral view.  After negative aspiration, 1 ml of Gadavist was injected confirming position in A/P and lateral views.  This showed a good spread of dye transforaminally into the epidural space.  There was no vascular update with contrast injection under continuous fluoroscopy. A 5cc volume of solution containing 1cc methylprednisolone (40mg/mL) and 4cc 0.5% bupivacaine was injected at each of the above documented levels.      The patient tolerated the procedure well.  Post-procedural instructions were provided and follow up plans discussed. The patient was instructed to contact the office if any problems any possible injection related problems or side effects were experienced. After having met discharge criteria, the patient was discharged in good condition.     Comments: No complications

## 2020-12-11 NOTE — OR SURGEON
Immediate Post OP Note    PreOp Diagnosis: Left lumbar radiculopathy    PostOp Diagnosis: Same    Procedure(s):  INJECTION, STEROID, SPINE, LUMBAR, EPIDURAL, TRANSFORAMINAL APPROACH - Wound Class: Clean    Surgeon(s):  Mohinder Moore M.D.    Anesthesiologist/Type of Anesthesia:  No anesthesia staff entered./Local    Surgical Staff:  Scrub Person: Petrona Cook  Sedation/Monitoring Nurse: Erica Avila R.N.  Radiology Technologist: Viktor Adam    Specimens removed if any:  * No specimens in log *    Estimated Blood Loss: None    Findings: None    Complications: None        12/11/2020 3:54 PM Mohinder Moore M.D.

## 2020-12-11 NOTE — NON-PROVIDER
Patient name verified. Procedure and site confirmed, informed consent signed.Has updated H&P. Medication allergies and current medications reviewed in epic. Has   waiting . Printed home care discharged instruction  as well as education regarding pre- post infection prevention  verbal given and understood by her. Pertinent medical health information  reviewed with in epic ( COPD,bronchitis, lumbago,  ) .Denied taking any anti- inflammatories  and anti-coagulation medication.

## 2020-12-11 NOTE — H&P
Physical Medicine & Rehab History & Physical Note    Date  12/11/2020    Primary Care Physician  Demetri Addison M.D.    CC  Pre-Op Diagnosis Codes:     * Lumbar radiculopathy [M54.16]    HPI  This is a 65 y.o. female who presented with left leg pain.    Past Medical History:   Diagnosis Date   • Allergic rhinitis    • Back pain    • Bronchitis    • Chickenpox    • Colitis     e coli and salmonella   • COPD (chronic obstructive pulmonary disease) (HCC)    • Glaucoma 2/25/2010   • Influenza    • Iritis    • Migraine    • Nasal drainage    • Neck pain    • Retinopathy    • Scarlet fever    • Tonsillitis        Past Surgical History:   Procedure Laterality Date   • INJ,SACROILIAC,ANES/STEROID Left 8/14/2020    Procedure: INJECTION, THERAPEUTIC AGENT, SACROILIAC JOINT;  Surgeon: Mohinder Moore M.D.;  [facility];  Service: Pain Management   • EYE SURGERY      multiple xiomara eye surgeries, lens implants   • OTHER      irridotomy both eyes & lense implants   • PB REMV 2ND CATARACT,CORN-SCLER SECTN         Current Facility-Administered Medications   Medication Dose Route Frequency Provider Last Rate Last Admin   • BUPIVACAINE HCL (PF) 0.5 % INJ SOLN            • LIDOCAINE HCL (PF) 2 % INJ SOLN            • LIDOCAINE HCL (PF) 1 % INJ SOLN            • IOHEXOL 240 MG/ML INJ SOLN                Social History     Socioeconomic History   • Marital status: Single     Spouse name: Not on file   • Number of children: Not on file   • Years of education: Not on file   • Highest education level: Not on file   Occupational History   • Not on file   Social Needs   • Financial resource strain: Not on file   • Food insecurity     Worry: Not on file     Inability: Not on file   • Transportation needs     Medical: Not on file     Non-medical: Not on file   Tobacco Use   • Smoking status: Current Every Day Smoker     Packs/day: 0.25     Years: 45.00     Pack years: 11.25     Types: Cigarettes   • Smokeless  tobacco: Never Used   • Tobacco comment: using nicotine patch   Substance and Sexual Activity   • Alcohol use: No     Alcohol/week: 0.0 oz     Comment: celebrations only--newyears   • Drug use: No   • Sexual activity: Not on file   Lifestyle   • Physical activity     Days per week: Not on file     Minutes per session: Not on file   • Stress: Not on file   Relationships   • Social connections     Talks on phone: Not on file     Gets together: Not on file     Attends Hindu service: Not on file     Active member of club or organization: Not on file     Attends meetings of clubs or organizations: Not on file     Relationship status: Not on file   • Intimate partner violence     Fear of current or ex partner: Not on file     Emotionally abused: Not on file     Physically abused: Not on file     Forced sexual activity: Not on file   Other Topics Concern   • Not on file   Social History Narrative   • Not on file       Family History   Problem Relation Age of Onset   • Heart Failure Mother    • Heart Disease Father    • Heart Attack Father        Allergies  Bacitracin, Hydrocortisone, Iodine, Latex, Maxepa, Neomycin, Niacin, Shellfish allergy, and Tomato    Review of Systems  Negative    Physical Exam    Vital Signs  Blood Pressure : 157/94   Temperature: 36.2 °C (97.2 °F)   Pulse: (!) 112   Respiration: 18   Pulse Oximetry: 91 %       Labs:                    Radiology:  DX-PORTABLE FLUOROSCOPY < 1 HOUR    (Results Pending)         Assessment/Plan:  Pre-Op Diagnosis Codes:     * Lumbar radiculopathy [M54.16]  Procedure(s):  INJECTION, STEROID, SPINE, LUMBAR, EPIDURAL, TRANSFORAMINAL APPROACH

## 2020-12-11 NOTE — PROGRESS NOTES
Preprocedure assessment completed.  Pertinent health information(pert allergies to latex, iodine, shellfish.) communicated to physician and staff during time out.  Patient positioned preprocedure by RN, CST, and XRAY.  Pillow under ankles for support.    Erica Avila

## 2020-12-12 NOTE — NON-PROVIDER
Fluids  and food tolerated well. Ice compress applied to the affected area. Dr. Moore evaluated patient.  Reviewed  home care instructions given and  she verbalized understanding. Discharged  without difficulty with designated  via wheelchair.

## 2020-12-28 ENCOUNTER — TELEPHONE (OUTPATIENT)
Dept: SLEEP MEDICINE | Facility: MEDICAL CENTER | Age: 65
End: 2020-12-28

## 2020-12-28 NOTE — TELEPHONE ENCOUNTER
Received fax from [employer] advising o2 has been denied by insurance.  Per 7/21/2020 notes pt was aware of the need for new testing and an OPO was ordered at the time of the OV.   Pt did not return calls to schedule the testing.    Advised [employer] pt needs to call our office to schedule the OPO and follow up appt (due 1/21/2020 - recall letter sent to pt in OCT)  Sent [employer] message to pt with this information as well

## 2021-01-22 ENCOUNTER — TELEMEDICINE (OUTPATIENT)
Dept: SLEEP MEDICINE | Facility: MEDICAL CENTER | Age: 66
End: 2021-01-22
Payer: COMMERCIAL

## 2021-01-22 VITALS
HEART RATE: 78 BPM | BODY MASS INDEX: 30.73 KG/M2 | OXYGEN SATURATION: 94 % | WEIGHT: 180 LBS | RESPIRATION RATE: 16 BRPM | HEIGHT: 64 IN

## 2021-01-22 DIAGNOSIS — G47.34 NOCTURNAL HYPOXIA: ICD-10-CM

## 2021-01-22 DIAGNOSIS — F17.210 SMOKING GREATER THAN 30 PACK YEARS: ICD-10-CM

## 2021-01-22 DIAGNOSIS — J30.1 ALLERGIC RHINITIS DUE TO POLLEN, UNSPECIFIED SEASONALITY: ICD-10-CM

## 2021-01-22 DIAGNOSIS — J44.9 CHRONIC OBSTRUCTIVE PULMONARY DISEASE, UNSPECIFIED COPD TYPE (HCC): ICD-10-CM

## 2021-01-22 PROCEDURE — 99214 OFFICE O/P EST MOD 30 MIN: CPT | Mod: 95,CR | Performed by: PHYSICIAN ASSISTANT

## 2021-01-22 RX ORDER — DILTIAZEM HYDROCHLORIDE 60 MG/1
TABLET, FILM COATED ORAL
Qty: 3 EACH | Refills: 3 | Status: SHIPPED | OUTPATIENT
Start: 2021-01-22 | End: 2022-02-11

## 2021-01-22 RX ORDER — NICOTINE 21 MG/24HR
1 PATCH, TRANSDERMAL 24 HOURS TRANSDERMAL EVERY 24 HOURS
Qty: 30 PATCH | Refills: 0 | Status: SHIPPED | OUTPATIENT
Start: 2021-01-22

## 2021-01-22 RX ORDER — MONTELUKAST SODIUM 10 MG/1
TABLET ORAL
Qty: 90 TAB | Refills: 3 | Status: SHIPPED | OUTPATIENT
Start: 2021-01-22 | End: 2022-02-11

## 2021-01-22 ASSESSMENT — FIBROSIS 4 INDEX: FIB4 SCORE: 0.83

## 2021-01-22 NOTE — PATIENT INSTRUCTIONS
1-reviewed medications  2-recheck overnight oximetry in July, using oxygen at 1L with benefit at night  3-reviewed covid 19 precautions including wear mask in public, social distancing, frequent handwashing, had flu shot   4-follow up in 6 months with PFT

## 2021-01-22 NOTE — PROGRESS NOTES
Virtual Visit: Established Patient   This visit was conducted via Zoom using secure and encrypted video conferencing technology. The patient was in a private location in the state [facility].  The patient's identity was confirmed and verbal consent was obtained for this virtual visit.  Visit length 30 minutes.      Subjective:   CC:   Chief Complaint   Patient presents with   • COPD   • Follow-Up       Kyra Funez is a 65 y.o. female presenting for evaluation and management of: Severe COPD and nocturnal hypoxia.  Patient is a current smoker with 30-pack-year history, reports desire to resume use the nicotine patch to cut back on the number of cigarettes per day.  Patient was last seen via telemedicine by Dr. Elisabeth Meyer on 7/21/20.      Pertinent past medical history includes chronic back pain with lack of mobility affecting her breathing, allergic rhinitis, recurrent sinusitis.    Reviewed home medication regimen including Symbicort, montelukast and Spiriva.  Has albuterol MDI for rescue use.  Requesting refill on cetirizine for her allergic rhinitis. Patient continues to use and benefit from 1 L of oxygen at night.    Reviewed most recent imaging which included a chest x-ray taken 10/2/2018 demonstrating a normal cardiac silhouette and mediastinal contours, no acute cardiopulmonary findings, interstitial prominence most likely related to technique.  Patient did have a 6-minute walk test on room air on 3/21/2019.  Patient did have oxygen desaturation down to 86% at 4 minutes but again declined supplemental O2.  Walks for a total distance of 400 feet which is 28% of her predicted distance.  Reports exertion limited by her back pain.     Patient did have pulmonary function testing completed 2/25/2019 which demonstrated an FVC of 1.7 L or 54% predicted, FEV1 of 0.74 L or 30% predicted, FEV1/FVC ratio of 50% predicted.  Patient had a residual volume of 220% predicted, TLC of 124% predicted and DLCO of 60%  "predicted.  Per pulmonologist interpretation PFTs consistent with very severe COPD with asthmatic component, best FEV1 1.74L or 30% predicted, hyperinflation and air trapping consistent with obstructive airway disease.  Mild gas transfer impairment consistent with emphysema. Patient did have an echo in October 2018 which per cardiology interpretation demonstrated a normal transthoracic echo with LVEF estimated 65%.    ROS     Constitutional: Denies any fever or chills  HEENT: Blind in left eye, prescription glasses, nasal congestion  Cardiac: Denies chest pains or palpitations  Respiratory: Reports a.m. cough, occasionally productive of clear secretions, shortness of breath noticed primarily when having back pain  GI: Reports some occasional choking episode  NEURO:  Denies headache, tremor, dizziness  PSYCHE:  No insomnia      Allergies   Allergen Reactions   • Bacitracin    • Hydrocortisone      Hives/blisters   • Iodine Shortness of Breath   • Latex      \"was told from her dentist, not sure reaction\"   Paper tape okay   • Maxepa Hives   • Neomycin      rash   • Niacin Shortness of Breath   • Shellfish Allergy    • Tomato        Current medicines (including changes today)  Current Outpatient Medications   Medication Sig Dispense Refill   • montelukast (SINGULAIR) 10 MG Tab Take 1 tablet by mouth once daily 90 Tab 3   • nicotine (NICODERM) 21 MG/24HR PATCH 24 HR Place 1 Patch on the skin every 24 hours. 30 Patch 0   • SYMBICORT 80-4.5 MCG/ACT Aerosol Inhale 2 puffs by mouth twice daily 3 Each 3   • EQ ALLERGY RELIEF, CETIRIZINE, 10 MG Tab Take 1 tablet by mouth once daily 90 Tab 0   • tiotropium (SPIRIVA HANDIHALER) 18 MCG Cap Inhale 1 puff by mouth once daily 90 Cap 3   • diphenhydrAMINE (BENADRYL) 25 MG capsule Take 1 Cap by mouth every 6 hours as needed. 30 Cap 2   • albuterol (VENTOLIN HFA) 108 (90 Base) MCG/ACT Aero Soln inhalation aerosol Inhale 2 Puffs by mouth every 6 hours as needed for Shortness of Breath. "     • Cholecalciferol (VITAMIN D) 2000 units Cap Take 1 Cap by mouth every day.     • timolol (BETIMOL) 0.5 % ophthalmic solution Place 1 Drop in both eyes 2 times a day. use in affect     • loratadine (CLARITIN) 10 MG Tab Take 1 Tab by mouth every day. 30 Tab 2   • Misc. Devices Misc Please provide with home nightly oxygen 1-2L via NC 1 Application 1   • nicotine (EQ NICOTINE) 14 MG/24HR PATCH 24 HR Apply 1 Patch to skin as directed every 24 hours. 30 Patch 1     No current facility-administered medications for this visit.        Patient Active Problem List    Diagnosis Date Noted   • Acute on chronic respiratory failure with hypoxemia ([facility]) 10/02/2018     Priority: High   • Persistent migraine aura with cerebral infarction ([facility])      Priority: High     Class: Chronic   • Vitamin D deficiency 03/19/2010     Priority: Medium     Class: Chronic   • Drug eruption 02/25/2020   • Chronic maxillary sinusitis 02/25/2020   • Pure hypercholesterolemia 06/11/2019   • Obesity (BMI 30-39.9) 06/11/2019   • Polycythemia 10/05/2018   • Acute exacerbation of chronic obstructive pulmonary disease (COPD) ([facility]) 10/02/2018   • Smoking greater than 30 pack years 06/19/2018   • Elevated BP without diagnosis of hypertension 03/27/2018   • Recurrent sinusitis 03/27/2018   • Visit for screening mammogram 03/27/2018   • Inflamed seborrheic keratosis 03/27/2018   • AK (actinic keratosis) 03/27/2018   • Bronchitis 06/24/2016   • Leg pain, posterior 06/19/2015   • Sciatica of left side 03/13/2015   • Hyperlipidemia 03/19/2010   • Glaucoma 02/25/2010   • Blindness, one eye 02/25/2010   • Iritis 02/25/2010   • Preventative health care 02/25/2010   • Retinopathy    • Neck pain        Family History   Problem Relation Age of Onset   • Heart Failure Mother    • Heart Disease Father    • Heart Attack Father        She  has a past medical history of Allergic rhinitis, Back pain, Bronchitis, Chickenpox, Colitis, COPD (chronic obstructive pulmonary  "disease) ([facility]), Glaucoma (2/25/2010), Influenza, Iritis, Migraine, Nasal drainage, Neck pain, Retinopathy, Scarlet fever, and Tonsillitis. She also has no past medical history of ASTHMA, CAD (coronary artery disease), Cancer ([facility]), Congestive heart failure ([facility]), Diabetes, Hypertension, Infectious disease, Psychiatric disorder, Renal disorder, or Seizure disorder ([facility]).  She  has a past surgical history that includes other; eye surgery; pr remv 2nd cataract,corn-scler sectn; inj,sacroiliac,anes/steroid (Left, 8/14/2020); and lumbar transforaminal epidural steroid injection (Left, 12/11/2020).       Objective:   Pulse 78   Resp 16   Ht 1.626 m (5' 4\")   Wt 81.6 kg (180 lb)   LMP  (LMP Unknown)   SpO2 94%   BMI 30.90 kg/m²     Physical Exam:  Constitutional: Alert, no distress, well-groomed.  Skin: No rashes in visible areas.  Eye: Round.  Changes noted in left eye. Right conjunctiva clear, right lid normal. No icterus.   ENMT: Lips pink without lesions, good dentition, moist mucous membranes. Phonation normal.  Neck: No masses, no thyromegaly. Moves freely without pain.  Respiratory: Unlabored respiratory effort, no cough or audible wheeze  Psych: Alert and oriented x3, normal affect and mood.       Assessment and Plan:   The following treatment plan was discussed:     1. Chronic obstructive pulmonary disease, unspecified COPD type ([facility])  - SYMBICORT 80-4.5 MCG/ACT Aerosol; Inhale 2 puffs by mouth twice daily  Dispense: 3 Each; Refill: 3  - PULMONARY FUNCTION TESTS -Test requested: Complete Pulmonary Function Test; Future    2. Allergic rhinitis due to pollen, unspecified seasonality  - montelukast (SINGULAIR) 10 MG Tab; Take 1 tablet by mouth once daily  Dispense: 90 Tab; Refill: 3    3. Smoking greater than 30 pack years  - nicotine (NICODERM) 21 MG/24HR PATCH 24 HR; Place 1 Patch on the skin every 24 hours.  Dispense: 30 Patch; Refill: 0    4. Nocturnal hypoxia  -using oxygen at 1L, with benefit at night "     Reviewed covid 19 precautions including wearing mask in public, social distancing, frequent handwashing, had flu shot.      Follow-up: Return in about 6 months (around 7/22/2021) for Return with PFT, Return with Milli Jones PA-C.

## 2021-02-16 ENCOUNTER — PATIENT MESSAGE (OUTPATIENT)
Dept: SLEEP MEDICINE | Facility: MEDICAL CENTER | Age: 66
End: 2021-02-16

## 2021-02-23 ENCOUNTER — TELEPHONE (OUTPATIENT)
Dept: SLEEP MEDICINE | Facility: MEDICAL CENTER | Age: 66
End: 2021-02-23

## 2021-02-24 NOTE — TELEPHONE ENCOUNTER
Patient called and LM stating that she has heard that Symbicort has gone generic. She would like an rx for a 90 day supply sent to [facility]. She was also told by the pharmacy that she will need an PA for the generic Symbicort for the 90 day supply.    Routed to Milli Jones and Jeni Patterson for review

## 2021-02-25 ENCOUNTER — TELEPHONE (OUTPATIENT)
Dept: SLEEP MEDICINE | Facility: MEDICAL CENTER | Age: 66
End: 2021-02-25

## 2021-02-25 NOTE — TELEPHONE ENCOUNTER
DOCUMENTATION OF PRIOR AUTH STATUS    1. Medication name and dose: Budesonide-Formoterol 80-4.5 mcg    2. Name and Phone # of Prescription coverage company: [employer] [phone]    3. Date Prior Auth was submitted: 2/23/2021    4. What information was given to obtain insurance decision: Clinical notes    5. Prior Auth letter Approved or Denied: Approved, no P/A is required    6. Pharmacy notified: Yes    7. Patient notified: Yes

## 2021-02-25 NOTE — TELEPHONE ENCOUNTER
MEDICATION PRIOR AUTHORIZATION NEEDED:    1. Name of Medication: Budesonide-Formoterol 80-4.5 mcg    2. Requested By (Name of Pharmacy): [facility]     3. Is insurance on file current? yes    4. What is the name & phone number of the 3rd party payor? [employer] [phone]

## 2021-03-03 DIAGNOSIS — Z23 NEED FOR VACCINATION: ICD-10-CM

## 2021-03-04 ENCOUNTER — TELEPHONE (OUTPATIENT)
Dept: SLEEP MEDICINE | Facility: MEDICAL CENTER | Age: 66
End: 2021-03-04

## 2021-03-05 NOTE — TELEPHONE ENCOUNTER
Caller: Kyra    Phone Number: [phone] (home)    Message: Pt called and lm.  She spoke with someone on Monday and they referred her to [facility].  I got a collection Bill.  Apparently the Oxygen needs Authorization.  I'm getting Frantic.

## 2021-03-09 NOTE — TELEPHONE ENCOUNTER
Per insurance pt needs updated OPO on room air and results appt within 30 days and new order (full requal)  Order for OPO on room air already placed.    Can you please assist by calling pt and scheduling OPO and results appt (would suggest putting pt with Milli for this appt only to expedite requal)     Please route back when scheduled and I will put in appt notes    Thank you

## 2021-03-09 NOTE — TELEPHONE ENCOUNTER
Called and spoke with pt. Pt said they are picking up her equipment tomorrow. Notified pt we can e-mail them and have them place it on hold.  She also has the option to change to another DME co. Pt said she is just going to let them pick it up.     Scheduled pt for OPO on 03/18/2021 and a virtual follow up appt with Milli Jones PA-C on 03/30/21

## 2021-03-18 ENCOUNTER — APPOINTMENT (OUTPATIENT)
Dept: SLEEP MEDICINE | Facility: MEDICAL CENTER | Age: 66
End: 2021-03-18
Payer: COMMERCIAL

## 2021-03-22 NOTE — TELEPHONE ENCOUNTER
Pt moved her appts to 5/7 and 6/1  Pt is without her o2 per pts choice to turn in vs completing testing sooner.

## 2021-05-07 ENCOUNTER — HOME STUDY (OUTPATIENT)
Dept: SLEEP MEDICINE | Facility: MEDICAL CENTER | Age: 66
End: 2021-05-07
Attending: INTERNAL MEDICINE
Payer: COMMERCIAL

## 2021-05-07 DIAGNOSIS — J44.9 CHRONIC OBSTRUCTIVE PULMONARY DISEASE, UNSPECIFIED COPD TYPE (HCC): ICD-10-CM

## 2021-05-11 PROCEDURE — 94762 N-INVAS EAR/PLS OXIMTRY CONT: CPT | Performed by: INTERNAL MEDICINE

## 2021-05-11 NOTE — PROCEDURES
Basal SPO2 was 83%.  Desaturations noted below 90% for 99.9% of the night, to a froy of 75%.    Interpretation:  Baseline hypoxia with significant nocturnal desaturations to a froy of 75% SPO2.    Recommendations:  Use supplemental oxygen 2 L/min nocturnally.

## 2021-06-01 ENCOUNTER — APPOINTMENT (OUTPATIENT)
Dept: SLEEP MEDICINE | Facility: MEDICAL CENTER | Age: 66
End: 2021-06-01
Payer: COMMERCIAL

## 2021-06-10 NOTE — TELEPHONE ENCOUNTER
Pt's results appt moved to 6/18  Pt clearly qualified for o2  Updated appt notes to have order, notes and testing faxed to [facility]

## 2021-06-15 NOTE — NON-PROVIDER
"Sander Young is status post robotic bilateral inguinal hernia repairs.  He presents today for Romero catheter removal given his postoperative inability to void.  He complains of mild inguinal discomfort but is otherwise doing well.  He has not taken his stool softeners and is not had a bowel movement since surgery.    EXAM:  /84 (Patient Site: Right Arm, Patient Position: Sitting, Cuff Size: Adult Large)  Pulse 88  Ht 6' 1\" (1.854 m)  Wt 180 lb (81.6 kg)  SpO2 97%  BMI 23.75 kg/m2  GENERAL: Well developed male, No acute distress, pleasant and conversant   EYES: Pupils equal, round and reactive, no scleral icterus  ABDOMEN: Soft, well-healed incisions  SKIN: Pink, warm and dry, no obvious rashes or lesions   NEURO:No focal deficits, ambulatory  MUSCULOSKELETAL:No obvious deformities, no swelling, normal appearing      ASSESSMENT AND PLAN:  Sander Yuong is doing well postoperatively.  We will remove his Romero catheter today and have him monitor his urine output over the next 8-10 hours.  If he is unable to void at that time that I recommended he present to the emergency room or emergency room for evaluation and straight cathing.  Additionally, I have advised him to begin taking his stool softeners since the narcotics can cause significant constipation.  He will follow-up with me in one half weeks for his formal postoperative visit.  Pradeep Almnazar D.O. CONTRERAS  [phone]  [facility] Department of Surgery  " 1312 PM . Tolerated procedure well. Accompanied to recovery room, ambulatory .

## 2021-06-18 ENCOUNTER — TELEMEDICINE (OUTPATIENT)
Dept: SLEEP MEDICINE | Facility: MEDICAL CENTER | Age: 66
End: 2021-06-18
Payer: COMMERCIAL

## 2021-06-18 VITALS — HEIGHT: 64 IN | WEIGHT: 180 LBS | OXYGEN SATURATION: 94 % | HEART RATE: 92 BPM | BODY MASS INDEX: 30.73 KG/M2

## 2021-06-18 DIAGNOSIS — J96.21 ACUTE ON CHRONIC RESPIRATORY FAILURE WITH HYPOXEMIA (HCC): ICD-10-CM

## 2021-06-18 DIAGNOSIS — F17.210 SMOKING GREATER THAN 30 PACK YEARS: ICD-10-CM

## 2021-06-18 PROCEDURE — 99214 OFFICE O/P EST MOD 30 MIN: CPT | Mod: 95 | Performed by: NURSE PRACTITIONER

## 2021-06-18 ASSESSMENT — FIBROSIS 4 INDEX: FIB4 SCORE: 0.84

## 2021-06-18 NOTE — PATIENT INSTRUCTIONS
1.  Order placed for nighttime oxygen.  Patient did qualify via O p.o. for 2 L supplemental oxygen while sleeping.  Patient advised of safety hazards regarding home O2 use.  2.  Patient advised to attempt to quit tobacco use.  She has a follow-up that is due in July 2021 with Milli Jones PA-C.  She states she intended to keep that appointment and will follow up for reevaluation.  3.  Patient declined request for medication refills at this time.  4.  Patient advised to reach out via [employer] with any questions or concerns before next appointment.  5.  Follow-up all healthcare related concerns with appropriate healthcare providers.

## 2021-06-18 NOTE — PROGRESS NOTES
Virtual Visit: Established Patient   This visit was conducted via Zoom using secure and encrypted videoconferencing technology. The patient was in a private location in the state of [place].    The patient's identity was confirmed and verbal consent was obtained for this virtual visit.    Subjective:   CC:   Chief Complaint   Patient presents with   • Follow-Up     opo results       Kyra Funez is a 66 y.o. female presenting for evaluation and management of:  COPD, chronic respiratory failure with hypoxemia, and current tobacco use. The purpose of today's visit is to review OPO that was ordered so the patient can requalify for home O2 use. Patient has since had to turn in his oxygen.  She states that she is currently exercising and trying to improve her breathing and need for oxygen.  Her exercise is interrupted through chronic back pain.  She sees pain management and a chiropractor for this.  She denies any new or worsening breathing problems.    Most recent visit was telemedicine with DENIS Jones PA-C. Patient is followed in clinic for severe COPD and nocturnal hypoxia. She is a current smoker with 30-pack-year history. She also has a past medical history of chronic back pain with lack of mobility, allergic rhinitis and sinusitis. He is currently on Symbicort montelukast and Spiriva. She also has albuterol as needed for rescue inhaler.      chest x-ray (10/2/2018):   normal cardiac silhouette and mediastinal contours, no acute cardiopulmonary findings, interstitial prominence most likely related to technique.   6-minute walk test (3/21/2019)   Patient did have oxygen desaturation down to 86% at 4 minutes but again declined supplemental O2.  Walks for a total distance of 400 feet which is 28% of her predicted distance.  Reports exertion limited by her back pain.     Pulmonary function testing (2/25/2019):   FVC of 1.7 L or 54% predicted, FEV1 of 0.74 L or 30% predicted, FEV1/FVC ratio of 50% predicted.  Patient had  "a residual volume of 220% predicted, TLC of 124% predicted and DLCO of 60% predicted.  Per pulmonologist interpretation PFTs consistent with very severe COPD with asthmatic component, best FEV1 1.74L or 30% predicted, hyperinflation and air trapping consistent with obstructive airway disease.  Mild gas transfer impairment consistent with emphysema.    Echocardiogram (October 2018):  normal transthoracic echo with LVEF estimated 65%.    O p.o. (5/7/2021):  Baseline hypoxia with significant nocturnal desaturations to a froy of 75% SPO2.   Recommendations:   Use supplemental oxygen 2 L/min nocturnally.    ROS:   Denies any recent fevers or chills. No nausea or vomiting. No chest pains or shortness of breath.     Allergies   Allergen Reactions   • Bacitracin    • Hydrocortisone      Hives/blisters   • Iodine Shortness of Breath   • Latex      \"was told from her dentist, not sure reaction\"   Paper tape okay   • Maxepa Hives   • Neomycin      rash   • Niacin Shortness of Breath   • Shellfish Allergy    • Tomato        Current medicines (including changes today)  Current Outpatient Medications   Medication Sig Dispense Refill   • montelukast (SINGULAIR) 10 MG Tab Take 1 tablet by mouth once daily 90 Tab 3   • SYMBICORT 80-4.5 MCG/ACT Aerosol Inhale 2 puffs by mouth twice daily 3 Each 3   • EQ ALLERGY RELIEF, CETIRIZINE, 10 MG Tab Take 1 tablet by mouth once daily 90 Tab 0   • tiotropium (SPIRIVA HANDIHALER) 18 MCG Cap Inhale 1 puff by mouth once daily 90 Cap 3   • diphenhydrAMINE (BENADRYL) 25 MG capsule Take 1 Cap by mouth every 6 hours as needed. 30 Cap 2   • timolol (BETIMOL) 0.5 % ophthalmic solution Place 1 Drop in both eyes 2 times a day. use in affect     • nicotine (NICODERM) 21 MG/24HR PATCH 24 HR Place 1 Patch on the skin every 24 hours. (Patient not taking: Reported on 6/18/2021) 30 Patch 0   • loratadine (CLARITIN) 10 MG Tab Take 1 Tab by mouth every day. (Patient not taking: Reported on 6/18/2021) 30 Tab 2 "   • Misc. Devices Misc Please provide with home nightly oxygen 1-2L via NC (Patient not taking: Reported on 6/18/2021) 1 Application 1   • nicotine (EQ NICOTINE) 14 MG/24HR PATCH 24 HR Apply 1 Patch to skin as directed every 24 hours. (Patient not taking: Reported on 6/18/2021) 30 Patch 1   • albuterol (VENTOLIN HFA) 108 (90 Base) MCG/ACT Aero Soln inhalation aerosol Inhale 2 Puffs by mouth every 6 hours as needed for Shortness of Breath. (Patient not taking: Reported on 6/18/2021)     • Cholecalciferol (VITAMIN D) 2000 units Cap Take 1 Cap by mouth every day. (Patient not taking: Reported on 6/18/2021)       No current facility-administered medications for this visit.       Patient Active Problem List    Diagnosis Date Noted   • Drug eruption 02/25/2020   • Chronic maxillary sinusitis 02/25/2020   • Pure hypercholesterolemia 06/11/2019   • Obesity (BMI 30-39.9) 06/11/2019   • Polycythemia 10/05/2018   • Acute on chronic respiratory failure with hypoxemia ([facility]) 10/02/2018   • Acute exacerbation of chronic obstructive pulmonary disease (COPD) ([facility]) 10/02/2018   • Smoking greater than 30 pack years 06/19/2018   • Elevated BP without diagnosis of hypertension 03/27/2018   • Recurrent sinusitis 03/27/2018   • Visit for screening mammogram 03/27/2018   • Inflamed seborrheic keratosis 03/27/2018   • AK (actinic keratosis) 03/27/2018   • Bronchitis 06/24/2016   • Leg pain, posterior 06/19/2015   • Sciatica of left side 03/13/2015   • Vitamin D deficiency 03/19/2010     Class: Chronic   • Hyperlipidemia 03/19/2010   • Glaucoma 02/25/2010   • Blindness, one eye 02/25/2010   • Iritis 02/25/2010   • Preventative health care 02/25/2010   • Retinopathy    • 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 She also has no past medical history of ASTHMA, CAD (coronary artery disease), Cancer ([facility]), Congestive heart failure ([facility]), Diabetes, Hypertension, Infectious disease, Psychiatric disorder, Renal disorder, or Seizure disorder ([facility]).  She  has a past surgical history that includes other; eye surgery; pr remv 2nd cataract,corn-scler sectn; inj,sacroiliac,anes/steroid (Left, 8/14/2020); and lumbar transforaminal epidural steroid injection (Left, 12/11/2020).       Objective:   Pulse 92   Ht 1.626 m (5' 4\")   Wt 81.6 kg (180 lb)   LMP  (LMP Unknown)   SpO2 94%   BMI 30.90 kg/m²     Physical Exam:  Constitutional: Alert, no distress, well-groomed.  Skin: No rashes in visible areas.  Eye: Round. Conjunctiva clear, lids normal. No icterus.   ENMT: Lips pink without lesions, good dentition, moist mucous membranes. Phonation normal.  Neck: No masses, no thyromegaly. Moves freely without pain.  Respiratory: Unlabored respiratory effort, no cough or audible wheeze  Psych: Alert and oriented x3, normal affect and mood.       Assessment and Plan:   The following treatment plan was discussed:     1. Acute on chronic respiratory failure with hypoxemia (HCC)  - DME O2 New Set Up    2. Smoking greater than 30 pack years  - DME O2 New Set Up    1.  Order placed for nighttime oxygen.  Patient did qualify via O p.o. for 2 L supplemental oxygen while sleeping.  Patient advised of safety hazards regarding home O2 use.  2.  Patient advised to attempt to quit tobacco use.  She has a follow-up that is due in July 2021 with Milli Jonse PA-C.  She states she intended to keep that appointment and will follow up for reevaluation.  3.  Patient declined request for medication refills at this time.  4.  Patient " advised to reach out via [employer] with any questions or concerns before next appointment.  5.  Follow-up all healthcare related concerns with appropriate healthcare providers.    Follow-up: Return in about 1 month (around 7/18/2021), or if symptoms worsen or fail to improve.

## 2021-07-07 ENCOUNTER — NURSE TRIAGE (OUTPATIENT)
Dept: HEALTH INFORMATION MANAGEMENT | Facility: OTHER | Age: 66
End: 2021-07-07

## 2021-07-09 ENCOUNTER — APPOINTMENT (OUTPATIENT)
Dept: RADIOLOGY | Facility: REHABILITATION | Age: 66
End: 2021-07-09
Attending: PHYSICAL MEDICINE & REHABILITATION
Payer: COMMERCIAL

## 2021-07-09 ENCOUNTER — HOSPITAL ENCOUNTER (OUTPATIENT)
Facility: REHABILITATION | Age: 66
End: 2021-07-09
Attending: PHYSICAL MEDICINE & REHABILITATION | Admitting: PHYSICAL MEDICINE & REHABILITATION
Payer: COMMERCIAL

## 2021-07-09 VITALS
BODY MASS INDEX: 29.85 KG/M2 | HEART RATE: 85 BPM | TEMPERATURE: 98.1 F | WEIGHT: 174.82 LBS | DIASTOLIC BLOOD PRESSURE: 89 MMHG | HEIGHT: 64 IN | RESPIRATION RATE: 16 BRPM | OXYGEN SATURATION: 91 % | SYSTOLIC BLOOD PRESSURE: 152 MMHG

## 2021-07-09 PROCEDURE — 700101 HCHG RX REV CODE 250

## 2021-07-09 PROCEDURE — 700117 HCHG RX CONTRAST REV CODE 255

## 2021-07-09 PROCEDURE — 62323 NJX INTERLAMINAR LMBR/SAC: CPT

## 2021-07-09 PROCEDURE — A9585 GADOBUTROL INJECTION: HCPCS

## 2021-07-09 PROCEDURE — 700111 HCHG RX REV CODE 636 W/ 250 OVERRIDE (IP)

## 2021-07-09 RX ORDER — GADOBUTROL 604.72 MG/ML
INJECTION INTRAVENOUS
Status: COMPLETED
Start: 2021-07-09 | End: 2021-07-09

## 2021-07-09 RX ORDER — DEXAMETHASONE SODIUM PHOSPHATE 10 MG/ML
INJECTION, SOLUTION INTRAMUSCULAR; INTRAVENOUS
Status: COMPLETED
Start: 2021-07-09 | End: 2021-07-09

## 2021-07-09 RX ORDER — BUPIVACAINE HYDROCHLORIDE 5 MG/ML
INJECTION, SOLUTION EPIDURAL; INTRACAUDAL
Status: COMPLETED
Start: 2021-07-09 | End: 2021-07-09

## 2021-07-09 RX ORDER — LIDOCAINE HYDROCHLORIDE 10 MG/ML
INJECTION, SOLUTION EPIDURAL; INFILTRATION; INTRACAUDAL; PERINEURAL
Status: COMPLETED
Start: 2021-07-09 | End: 2021-07-09

## 2021-07-09 RX ORDER — ONDANSETRON 2 MG/ML
4 INJECTION INTRAMUSCULAR; INTRAVENOUS
Status: DISCONTINUED | OUTPATIENT
Start: 2021-07-09 | End: 2021-07-09 | Stop reason: HOSPADM

## 2021-07-09 RX ORDER — METHYLPREDNISOLONE ACETATE 40 MG/ML
INJECTION, SUSPENSION INTRA-ARTICULAR; INTRALESIONAL; INTRAMUSCULAR; SOFT TISSUE
Status: COMPLETED
Start: 2021-07-09 | End: 2021-07-09

## 2021-07-09 RX ADMIN — GADOBUTROL 3 ML: 604.72 INJECTION INTRAVENOUS at 14:47

## 2021-07-09 RX ADMIN — BUPIVACAINE HYDROCHLORIDE 5 ML: 5 INJECTION, SOLUTION EPIDURAL; INTRACAUDAL at 14:46

## 2021-07-09 RX ADMIN — METHYLPREDNISOLONE ACETATE 40 MG: 40 INJECTION, SUSPENSION INTRA-ARTICULAR; INTRALESIONAL; INTRAMUSCULAR; SOFT TISSUE at 14:49

## 2021-07-09 ASSESSMENT — PAIN DESCRIPTION - PAIN TYPE
TYPE: CHRONIC PAIN
TYPE: CHRONIC PAIN

## 2021-07-09 ASSESSMENT — FIBROSIS 4 INDEX: FIB4 SCORE: 0.84

## 2021-07-09 NOTE — PROGRESS NOTES
Pt given verbal and written d/c instructions including verbal infection prevention information and s/s of post procedure infection and verbalizes understanding. denies nsaid use or blood thinners use in last 5 days, denies current infection or abx use. Pt uses nocturnal O2 2L, hx COPD, O2 sat 90-92% on RA. Med rec complete. Pt has post procedural ride home with her brother Aubrey.

## 2021-07-09 NOTE — OP REPORT
Lumbar Epidural Steroid Injection    LEVEL: L4-5    PRE PROCEDURE DIAGNOSIS: Lumbar disc displacement/sciatica, lumbar radiculopathy  POST PROCEDURE DIAGNOSIS: Same         ANESTHESIA: Local         The patient was interviewed and the medical record reviewed.  There were no medical, pharmacologic, radiographic or other structural contraindications to attempting fluoroscopically guided epidural steroid injection.  Risks and expected side effects (including, but not limited to, potential for failure of the procedure or worsening of pain, bleeding, infection, nerve injury, dural tear with spinal fluid leak, systemic reaction to the contrast, anesthetic, or steroid, anaphylactic shock, and elevation of blood sugar) as well as potential benefit of the procedure were reviewed with the patient and all concerns addressed.  The printed consent form was signed and witnessed. A standard time-out procedure was performed.         DESCRIPTION OF PROCEDURE:  The patient was brought to the fluoroscopy suite and placed in the prone position on the fluoroscopy table.  The skin entry point for entering the epidural space by a left paramedian interlaminar approach at the above documented level was identified under fluoroscopy and marked. Following thorough Hibiclens preparation of the skin, sterile draping, and 0.5% bupivacaine infiltration of the skin entry point and subcutaneous tissues, a 20 gauge Tuohy needle was placed under fluoroscopic guidance with loss of resistance technique into the epidural space. There was no paresthesia or return of blood or CSF through the needle. 1 ml Gadvist was injected with clear epidural spread in the A/P and contralateral oblique views confirming epidural placement without vascular uptake. A 5cc volume of solution containing 1cc methylprednisolone (40mg/mL) and 4cc preservative-free sterile saline was injected into the epidural space. The stylet was replaced and the needle withdrawn.         The  procedure was well tolerated. Post-procedural instructions were provided and follow up plans discussed. The patient was instructed to contact the office if any injection related problems or side effects were experienced. After meeting discharge criteria, the patient was discharged in good condition.          Comments: No complications

## 2021-07-09 NOTE — PROGRESS NOTES
Pt received to recovery area with report from procedure room RN Margie.  VSS.  Ice compress applied to the affected area.  No swelling noted, dressing CDI.  Pt tolerates PO fluids and snack without difficulty.  Dr. Moore evaluated patient.  Meets criteria for discharge.  Pt ambulatory without difficulty.  Discharged to designated .

## 2021-07-09 NOTE — NON-PROVIDER
Onto procedure table  on prone positioned and necessary monitoring equipment connected ( pulse oximeter ) with the help by  team  (  CST, X-ray TECH and RN ). Hands supported with stool underneath headrest of the bed , lower extremities supported with pillow.Identified patient, medication allergies, site and procedure confirmed and yoko by Dr. Moore . Reviewed medical health history  ( COPD, migraine, HTN ).Timed out agreed by team and patient..

## 2021-07-09 NOTE — H&P
Post-Admission Physician Evaluation Form / H&P      PATIENT INFORMATION    Name: Kyra Funez     MRN: [id number]       : 1955         Age: 66 y.o.    Gender: female       HPI:   65 year old woman complains of low back and left leg pain since  of insidious onset.           Past Medical History:   Diagnosis Date   • Allergic rhinitis    • Back pain    • Bronchitis    • Chickenpox    • Colitis     e coli and salmonella   • COPD (chronic obstructive pulmonary disease) (HCC)    • Glaucoma 2010   • Influenza    • Iritis    • Migraine    • Nasal drainage    • Neck pain    • Retinopathy    • Scarlet fever    • Tonsillitis        Past Surgical History:   Procedure Laterality Date   • LUMBAR TRANSFORAMINAL EPIDURAL STEROID INJECTION Left 2020    Procedure: INJECTION, STEROID, SPINE, LUMBAR, EPIDURAL, TRANSFORAMINAL APPROACH;  Surgeon: Mohinder Moore M.D.;  [facility];  Service: Pain Management   • INJ,SACROILIAC,ANES/STEROID Left 2020    Procedure: INJECTION, THERAPEUTIC AGENT, SACROILIAC JOINT;  Surgeon: Mohinder Moore M.D.;  Location: [facility];  Service: Pain Management   • EYE SURGERY      multiple xiomara eye surgeries, lens implants   • OTHER      irridotomy both eyes & lense implants   • PB REMV 2ND CATARACT,CORN-SCLER SECTN         FAMILY HISTORY:  Non-contributory.        ALLERGIES:  Bacitracin, Hydrocortisone, Iodine, Latex, Maxepa, Neomycin, Niacin, Shellfish allergy, and Tomato     DIET:   None    CURRENT MEDICATIONS:  Current Facility-Administered Medications   Medication Frequency   • DEXAMETHASONE SOD PHOSPHATE PF 10 MG/ML INJ SOLN    • LIDOCAINE HCL (PF) 1 % INJ SOLN    • GADOBUTROL 1 MMOL/ML IV SOLN            REVIEW OF SYSTEMS:     ROS    LABS:                   RADIOLOGY:  Disc bulging L4-5 on lumbar spine MRI 2018    PHYSICAL EXAM:    VITAL SIGNS: /89   Pulse 82   Temp 36.7 °C (98.1 °F) (Temporal)   Resp 16   Ht  "1.626 m (5' 4\")   Wt 79.3 kg (174 lb 13.2 oz)   LMP  (LMP Unknown)   SpO2 92%   BMI 30.01 kg/m²      CONSTITUTIONAL:  No apparent distress  HEENT: normocephalic/atraumatic; EOMI, PERRL, no nystagmus     CARDIAC: regular rate and rhythm, normal S1, S2   LUNGS: clear to ascultation   ABDOMEN: soft, nontender, nondistended, bowel sounds present   EXTREMITIES: No contractures, spasticity, or edema.  No bilateral calf tenderness  2+ bilateral DP/PT pulses.    NEURO:    Mental status:  A&Ox4 (person, place, date, situation), answers questions appropriately, follows commands    Speech: fluent, no aphasia or dysarthria    CRANIAL NERVES:  2,3: visual acuity grossly intact, PERRL  3,4,6: EOMI bilaterally, no nystagmus or diplopia  7: no facial asymmetry  8: hearing grossly intact  9,10: symmetric palate elevation  11: SCM/Trapezius strength 5/5 bilaterally  12: tongue protrudes midline    Motor:     Hip flexors:  Bilateral -  5/5  Knee ext:  Bilateral -  5/5  Dorsiflexors:  Bilateral -  5/5  EHL:  Bilateral -  5/5  Plantar flexors:  Bilateral -  5/5    Sensory: intact to light touch through out    DTRs: 2+ in bilateral biceps and patellar tendons  Negative babinski b/l  Negative Laureano b/l     Tone: no spasticity noted    Proprioception:  Coordination: finger to nose, fine motor intact with fingers to thumb    IMPRESSION  This patient is a 66 y.o. female admitted for a left L5-S1 IL MASHA.    Discharge Plan: Discharge to pre-morbid independent living setting with the supportive care of patient's family.    Mohinder Moore M.D.          "

## 2021-07-09 NOTE — OR SURGEON
Immediate Post OP Note    Pre-Op Diagnosis Codes:     * Lumbar radiculopathy [M54.16]      Post-Op Diagnosis Codes:     * Lumbar radiculopathy [M54.16]      Procedure(s):  LEFT L4-L5 lumbar epidural steroid injection - Wound Class: Clean    Surgeon(s):  Mohinder Moore M.D.    Anesthesiologist/Type of Anesthesia:  No anesthesia staff entered./Local    Surgical Staff:  Circulator: Margie Clement R.N.  Scrub Person: Petrona Cook  Radiology Technologist: Tosha Craven    Specimens removed if any:  * No specimens in log *    Estimated Blood Loss: None    Findings: None    Complications: None        7/9/2021 2:53 PM Mohinder Moore M.D.

## 2021-08-03 ENCOUNTER — PATIENT MESSAGE (OUTPATIENT)
Dept: HEALTH INFORMATION MANAGEMENT | Facility: OTHER | Age: 66
End: 2021-08-03

## 2021-08-09 ENCOUNTER — TELEMEDICINE (OUTPATIENT)
Dept: SLEEP MEDICINE | Facility: MEDICAL CENTER | Age: 66
End: 2021-08-09
Payer: COMMERCIAL

## 2021-08-09 VITALS
WEIGHT: 174 LBS | HEART RATE: 81 BPM | OXYGEN SATURATION: 96 % | BODY MASS INDEX: 29.71 KG/M2 | DIASTOLIC BLOOD PRESSURE: 77 MMHG | SYSTOLIC BLOOD PRESSURE: 123 MMHG | HEIGHT: 64 IN

## 2021-08-09 DIAGNOSIS — J96.11 CHRONIC RESPIRATORY FAILURE WITH HYPOXIA (HCC): ICD-10-CM

## 2021-08-09 DIAGNOSIS — J44.9 CHRONIC OBSTRUCTIVE PULMONARY DISEASE, UNSPECIFIED COPD TYPE (HCC): ICD-10-CM

## 2021-08-09 PROCEDURE — 99213 OFFICE O/P EST LOW 20 MIN: CPT | Mod: 95 | Performed by: PHYSICIAN ASSISTANT

## 2021-08-09 RX ORDER — TIOTROPIUM BROMIDE 18 UG/1
CAPSULE ORAL; RESPIRATORY (INHALATION)
Qty: 90 CAPSULE | Refills: 3 | Status: SHIPPED | OUTPATIENT
Start: 2021-08-09

## 2021-08-09 ASSESSMENT — FIBROSIS 4 INDEX: FIB4 SCORE: 0.84

## 2021-08-09 NOTE — PROGRESS NOTES
Virtual Visit: Established Patient   This visit was conducted via {Platform used: Zoom using secure and encrypted videoconferencing technology.   The patient was in a private location in the state [facility].    The patient's identity was confirmed and verbal consent was obtained for this virtual visit.    Subjective:   CC:   Chief Complaint   Patient presents with   • Follow-Up   • COPD   • Hospital Follow-up       Kyra Funez is a 66 y.o. female presenting for evaluation and management of: Chronic respiratory failure with hypoxia and COPD.  She is a current smoker reported at 5 cigarettes/day, she has smoked for 45 years.  Reports using nicotine patches.    Patient last seen in 6/18/2020 by ALEX Francisco to review her O2 needs and overnight pulse oximetry.  At this time patient has not yet received her O2.  This delay in part was due to insurance change.    Pertinent past medical history includes chronic back pain with lack of mobility affecting her breathing, allergic rhinitis, recurrent sinusitis.  She did recently receive steroid injections with reported relief and her pain.  She has been able to increase her walking.    She did have an overnight pulse oximeter demonstrating decreased O2 sats to 75% and was ordered on 2 L at night.    No recent chest imaging.  She did have an echocardiogram in October 2018 demonstrating normal left ventricular chamber size, wall thickness and systolic function, LVEF estimated 65%, normal diastolic function, normal right ventricular size and systolic function.    Patient did have pulmonary function testing completed 2/25/2019 which demonstrated an FVC of 1.7 L or 54% predicted, FEV1 of 0.74 L or 30% predicted, FEV1/FVC ratio of 50% predicted.  Patient had a residual volume of 220% predicted, TLC of 124% predicted and DLCO of 60% predicted.      Per pulmonologist interpretation PFTs consistent with very severe COPD with asthmatic component, best FEV1 1.74L or 30%  predicted, hyperinflation and air trapping consistent with obstructive airway disease.  Mild gas transfer impairment consistent with emphysema. Patient did have an echo in October 2018 which per cardiology interpretation demonstrated a normal transthoracic echo with LVEF estimated 65%.    Review of Systems   Constitutional: Negative for chills, fever, malaise/fatigue and weight loss.   HENT: Negative for congestion, hearing loss, sinus pain, sore throat and tinnitus.    Eyes:        Presc glasses   Respiratory: Positive for sputum production (clear) and wheezing. Negative for cough and shortness of breath.    Cardiovascular: Negative for chest pain, palpitations, orthopnea and leg swelling.   Gastrointestinal: Negative for heartburn.        No dentures or difficulty swallowing    Neurological: Negative for dizziness, tremors and headaches.   Psychiatric/Behavioral: The patient does not have insomnia.        Current medicines (including changes today)  Current Outpatient Medications   Medication Sig Dispense Refill   • tiotropium (SPIRIVA HANDIHALER) 18 MCG Cap Inhale 1 puff by mouth once daily 90 capsule 3   • montelukast (SINGULAIR) 10 MG Tab Take 1 tablet by mouth once daily 90 Tab 3   • nicotine (NICODERM) 21 MG/24HR PATCH 24 HR Place 1 Patch on the skin every 24 hours. 30 Patch 0   • SYMBICORT 80-4.5 MCG/ACT Aerosol Inhale 2 puffs by mouth twice daily 3 Each 3   • EQ ALLERGY RELIEF, CETIRIZINE, 10 MG Tab Take 1 tablet by mouth once daily 90 Tab 0   • diphenhydrAMINE (BENADRYL) 25 MG capsule Take 1 Cap by mouth every 6 hours as needed. 30 Cap 2   • timolol (BETIMOL) 0.5 % ophthalmic solution Place 1 Drop in both eyes 2 times a day. use in affect     • Misc. Devices Misc Please provide with home nightly oxygen 1-2L via NC (Patient not taking: Reported on 6/18/2021) 1 Application 1   • Cholecalciferol (VITAMIN D) 2000 units Cap Take 1 Cap by mouth every day. (Patient not taking: Reported on 6/18/2021)       No  "current facility-administered medications for this visit.       Patient Active Problem List    Diagnosis Date Noted   • Drug eruption 02/25/2020   • Chronic maxillary sinusitis 02/25/2020   • Pure hypercholesterolemia 06/11/2019   • Obesity (BMI 30-39.9) 06/11/2019   • Polycythemia 10/05/2018   • Acute on chronic respiratory failure with hypoxemia (HCC) 10/02/2018   • Acute exacerbation of chronic obstructive pulmonary disease (COPD) (Formerly [facility]) 10/02/2018   • Smoking greater than 30 pack years 06/19/2018   • Elevated BP without diagnosis of hypertension 03/27/2018   • Recurrent sinusitis 03/27/2018   • Visit for screening mammogram 03/27/2018   • Inflamed seborrheic keratosis 03/27/2018   • AK (actinic keratosis) 03/27/2018   • Bronchitis 06/24/2016   • Leg pain, posterior 06/19/2015   • Sciatica of left side 03/13/2015   • Vitamin D deficiency 03/19/2010     Class: Chronic   • Hyperlipidemia 03/19/2010   • Glaucoma 02/25/2010   • Blindness, one eye 02/25/2010   • Iritis 02/25/2010   • Preventative health care 02/25/2010   • Retinopathy    • Persistent migraine aura with cerebral infarction (Former[facility])      Class: Chronic   • Neck pain         Objective:   /77   Pulse 81   Ht 1.626 m (5' 4\")   Wt 78.9 kg (174 lb)   LMP  (LMP Unknown)   SpO2 96%   BMI 29.87 kg/m²     Physical Exam:  Constitutional: Alert, no distress, well-groomed.  Skin: No rashes in visible areas.  Eye: Round. Conjunctiva clear, lids normal. No icterus.   ENMT: Lips pink without lesions, good dentition, moist mucous membranes. Phonation normal.  Neck: No masses, no thyromegaly. Moves freely without pain.  Respiratory: Unlabored respiratory effort, no cough or audible wheeze  Psych: Alert and oriented x3, normal affect and mood.     Assessment and Plan:   The following treatment plan was discussed:     1. Chronic obstructive pulmonary disease, unspecified COPD type (HCC)  - tiotropium (SPIRIVA HANDIHALER) 18 MCG Cap; Inhale 1 puff by mouth once " daily  Dispense: 90 capsule; Refill: 3    2. Chronic respiratory failure with hypoxia (HCC)  -O2 ordered and 2 L following overnight pulse oximetry, patient has not yet received.  Will resend order.        Follow-up: Return in about 3 months (around 11/9/2021) for Return with Milli Jones PA-C.

## 2021-08-09 NOTE — PATIENT INSTRUCTIONS
1-still awaiting oxygen   2-will resend order with updated [employer] #[id number]  3-improved pain controlled, some increased activity  4-works full time [employer]  5-monitoring saturations  6-refill on Spiriva  7-working on weight loss  8-follow up in 3 months

## 2021-08-17 ENCOUNTER — TELEPHONE (OUTPATIENT)
Dept: SLEEP MEDICINE | Facility: MEDICAL CENTER | Age: 66
End: 2021-08-17

## 2021-08-17 NOTE — TELEPHONE ENCOUNTER
[facility] is sending a letter stating that this patient's testing is too old.  It looks like Mellissa was working on this before she left.  So does this patient need a new OPO?  Her last appointment was on 6/18.  OPO was on 5/7.  Notes from Mellissa on 6/10.  Please advise, thank you.

## 2021-08-27 ENCOUNTER — HOSPITAL ENCOUNTER (OUTPATIENT)
Facility: REHABILITATION | Age: 66
End: 2021-08-27
Attending: PHYSICAL MEDICINE & REHABILITATION | Admitting: PHYSICAL MEDICINE & REHABILITATION
Payer: COMMERCIAL

## 2021-08-27 ENCOUNTER — APPOINTMENT (OUTPATIENT)
Dept: RADIOLOGY | Facility: REHABILITATION | Age: 66
End: 2021-08-27
Attending: PHYSICAL MEDICINE & REHABILITATION
Payer: COMMERCIAL

## 2021-08-27 VITALS
TEMPERATURE: 98.7 F | DIASTOLIC BLOOD PRESSURE: 92 MMHG | RESPIRATION RATE: 16 BRPM | HEART RATE: 78 BPM | OXYGEN SATURATION: 94 % | WEIGHT: 172.84 LBS | SYSTOLIC BLOOD PRESSURE: 152 MMHG | BODY MASS INDEX: 29.51 KG/M2 | HEIGHT: 64 IN

## 2021-08-27 PROCEDURE — 700117 HCHG RX CONTRAST REV CODE 255

## 2021-08-27 PROCEDURE — G0260 INJ FOR SACROILIAC JT ANESTH: HCPCS

## 2021-08-27 PROCEDURE — 700101 HCHG RX REV CODE 250

## 2021-08-27 PROCEDURE — 700111 HCHG RX REV CODE 636 W/ 250 OVERRIDE (IP)

## 2021-08-27 PROCEDURE — A9585 GADOBUTROL INJECTION: HCPCS

## 2021-08-27 RX ORDER — METHYLPREDNISOLONE ACETATE 80 MG/ML
INJECTION, SUSPENSION INTRA-ARTICULAR; INTRALESIONAL; INTRAMUSCULAR; SOFT TISSUE
Status: COMPLETED
Start: 2021-08-27 | End: 2021-08-27

## 2021-08-27 RX ORDER — LIDOCAINE HYDROCHLORIDE 10 MG/ML
INJECTION, SOLUTION EPIDURAL; INFILTRATION; INTRACAUDAL; PERINEURAL
Status: COMPLETED
Start: 2021-08-27 | End: 2021-08-27

## 2021-08-27 RX ORDER — BUPIVACAINE HYDROCHLORIDE 5 MG/ML
INJECTION, SOLUTION EPIDURAL; INTRACAUDAL
Status: COMPLETED
Start: 2021-08-27 | End: 2021-08-27

## 2021-08-27 RX ORDER — LIDOCAINE HYDROCHLORIDE 20 MG/ML
INJECTION, SOLUTION EPIDURAL; INFILTRATION; INTRACAUDAL; PERINEURAL
Status: COMPLETED
Start: 2021-08-27 | End: 2021-08-27

## 2021-08-27 RX ORDER — ONDANSETRON 2 MG/ML
4 INJECTION INTRAMUSCULAR; INTRAVENOUS
Status: DISCONTINUED | OUTPATIENT
Start: 2021-08-27 | End: 2021-08-27 | Stop reason: HOSPADM

## 2021-08-27 RX ORDER — GADOBUTROL 604.72 MG/ML
INJECTION INTRAVENOUS
Status: COMPLETED
Start: 2021-08-27 | End: 2021-08-27

## 2021-08-27 RX ORDER — METHYLPREDNISOLONE ACETATE 40 MG/ML
INJECTION, SUSPENSION INTRA-ARTICULAR; INTRALESIONAL; INTRAMUSCULAR; SOFT TISSUE
Status: COMPLETED
Start: 2021-08-27 | End: 2021-08-27

## 2021-08-27 RX ADMIN — BUPIVACAINE HYDROCHLORIDE 5 ML: 5 INJECTION, SOLUTION EPIDURAL; INTRACAUDAL at 14:06

## 2021-08-27 RX ADMIN — GADOBUTROL 5 ML: 604.72 INJECTION INTRAVENOUS at 14:07

## 2021-08-27 RX ADMIN — METHYLPREDNISOLONE ACETATE 40 MG: 40 INJECTION, SUSPENSION INTRA-ARTICULAR; INTRALESIONAL; INTRAMUSCULAR; SOFT TISSUE at 14:07

## 2021-08-27 RX ADMIN — LIDOCAINE HYDROCHLORIDE 5 ML: 10 INJECTION, SOLUTION EPIDURAL; INFILTRATION; INTRACAUDAL; PERINEURAL at 14:05

## 2021-08-27 ASSESSMENT — PAIN DESCRIPTION - PAIN TYPE: TYPE: CHRONIC PAIN

## 2021-08-27 ASSESSMENT — FIBROSIS 4 INDEX: FIB4 SCORE: 0.84

## 2021-08-27 NOTE — H&P
Post-Admission Physician Evaluation Form / H&P      PATIENT INFORMATION    Name: Kyra Funez     MRN: [id number]       : 1955         Age: 66 y.o.    Gender: female     Date/time of evaluation:  2021 1:56 PM  Date of Admission: 2021 12:33 PM      HPI:   65 year old woman complains of left-sided low back pain.     Functional Status:  Current: I  Past: I    Past Medical History:   Diagnosis Date   • Allergic rhinitis    • Back pain    • Bronchitis    • Chickenpox    • Colitis     e coli and salmonella   • COPD (chronic obstructive pulmonary disease) ([facility])    • Glaucoma 2010   • Influenza    • Iritis    • Migraine    • Nasal drainage    • Neck pain    • Retinopathy    • Scarlet fever    • Tonsillitis        Past Surgical History:   Procedure Laterality Date   • VT INJ LUMBAR/SACRAL,W/ IMAGING Left 2021    Procedure: LEFT L4-L5 lumbar epidural steroid injection;  Surgeon: Mohinder Moore M.D.;  Location[facility];  Service: Pain Management   • LUMBAR TRANSFORAMINAL EPIDURAL STEROID INJECTION Left 2020    Procedure: INJECTION, STEROID, SPINE, LUMBAR, EPIDURAL, TRANSFORAMINAL APPROACH;  Surgeon: Mohinder Moore M.D.;  Location: [facility];  Service: Pain Management   • INJ,SACROILIAC,ANES/STEROID Left 2020    Procedure: INJECTION, THERAPEUTIC AGENT, SACROILIAC JOINT;  Surgeon: Mohinder Moore M.D.;  Location: [facility];  Service: Pain Management   • EYE SURGERY      multiple xiomara eye surgeries, lens implants   • OTHER      irridotomy both eyes & lense implants   • PB REMV 2ND CATARACT,CORN-SCLER SECTN         FAMILY HISTORY:  Non-contributory      ALLERGIES:  Bacitracin, Hydrocortisone, Iodine, Latex, Maxepa, Neomycin, Niacin, Shellfish allergy, and Tomato     DIET:   None    CURRENT MEDICATIONS:  Current Facility-Administered Medications   Medication Frequency   • BUPIVACAINE HCL (PF) 0.5 % INJ SOLN    • LIDOCAINE  "HCL (PF) 2 % INJ SOLN    • LIDOCAINE HCL (PF) 1 % INJ SOLN    • GADOBUTROL 1 MMOL/ML IV SOLN    • METHYLPREDNISOLONE ACETATE 40 MG/ML INJ SUSP            REVIEW OF SYSTEMS:     ROS    LABS:                     PHYSICAL EXAM:    VITAL SIGNS: /87   Pulse 83   Temp 37.1 °C (98.7 °F) (Skin)   Resp 16   Ht 1.626 m (5' 4\")   Wt 78.4 kg (172 lb 13.5 oz)   LMP  (LMP Unknown)   SpO2 94%   BMI 29.67 kg/m²      CONSTITUTIONAL:  No apparent distress  HEENT: normocephalic/atraumatic; EOMI, PERRL, no nystagmus     CARDIAC: regular rate and rhythm, normal S1, S2   LUNGS: clear to ascultation   ABDOMEN: soft, nontender, nondistended, bowel sounds present   EXTREMITIES: No contractures, spasticity, or edema.  No bilateral calf tenderness  2+ bilateral DP/PT pulses.    NEURO:    Mental status:  A&Ox4 (person, place, date, situation), answers questions appropriately, follows commands    Speech: fluent, no aphasia or dysarthria    CRANIAL NERVES:  2,3: visual acuity grossly intact, PERRL  3,4,6: EOMI bilaterally, no nystagmus or diplopia  7: no facial asymmetry  8: hearing grossly intact  9,10: symmetric palate elevation  11: SCM/Trapezius strength 5/5 bilaterally  12: tongue protrudes midline    Motor:     Hip flexors:  Bilateral -  5/5  Knee ext:  Bilateral -  5/5  Dorsiflexors:  Bilateral -  5/5  EHL:  Bilateral -  5/5  Plantar flexors:  Bilateral -  5/5    Sensory: intact to light touch through out    DTRs: 2+ in bilateral biceps and patellar tendons  Negative babinski b/l  Negative Laureano b/l     Tone: no spasticity noted    Proprioception:  Coordination: finger to nose, fine motor intact with fingers to thumb    IMPRESSION  This patient is a 66 y.o. female admitted for left sacroiliac joint injection.    Discharge Plan: Discharge to pre-morbid independent living setting with the supportive care of patient's family.    Mohinder Moore M.D.          "

## 2021-08-27 NOTE — OR SURGEON
Immediate Post OP Note    Pre-Op Diagnosis Codes:     * Sacroiliitis (HCC) [M46.1]      Post-Op Diagnosis Codes:     * Sacroiliitis (HCC) [M46.1]      Procedure(s):  LEFT sacroiliac joint injections - Wound Class: Clean    Surgeon(s):  Mohinder Moore M.D.    Anesthesiologist/Type of Anesthesia:  No anesthesia staff entered./Local    Surgical Staff:  Circulator: Almaz Jones R.N.  Scrub Person: CARLOS LopezNKISHAN  Radiology Technologist: Nirav Villasenor    Specimens removed if any:  * No specimens in log *    Estimated Blood Loss: None    Findings: None    Complications: None        8/27/2021 2:11 PM Mohinder Moore M.D.

## 2021-08-27 NOTE — PROGRESS NOTES
1446 PM.      Fluids and food tolerated well.  Reviewed  home care   instructions given and  she verbalized understanding. Dr. Moore evaluated patient. Meets criteria for discharged  without difficulty with designated  via wheelchair.

## 2021-08-27 NOTE — PROGRESS NOTES
Handoff received from pre-procedure RN. Pre assessment complete with pt input, time out completed, including 2 pt identifiers, procedure and site of procedure, allergies - including to iodine and shellfish - gatavist to be used, stop bang = 1, pt history including COPD, asthma, blind in left eye. O2 2L in place while pt lying prone. Pt positioned prone by CNA, RN, XRT, ankles resting on pillow, hands resting on stool under head of procedure table.

## 2021-08-27 NOTE — PROGRESS NOTES
1245 PM.     Verified patient name. Procedure  and site confirmed. Consent signed. H&P updated. Reviewed medication allergies and current medication in epic. Printed home care discharged   pre and post including infection  prevention verbal instruction given to patient and  she verbalized understanding.  Confirmed   waiting. Pertinent medical health  information reviewed in epic  ( one eye blindness, HTN, retinopahy ) .  Patient denied taking blood thinner and anti-inflammatory medication.

## 2021-08-27 NOTE — OP REPORT
Sacroiliac Joint Injection    SIDE: Left         PRE PROCEDURE DIAGNOSIS: Sacroiliac pain/dysfunction  POST PROCEDURE DIAGNOSIS: Same    ANESTHESIA: Local           The patient was interviewed and the medical record reviewed.  There were no medical, pharmacologic, radiographic or other structural contraindications to attempting fluoroscopically guided sacroiliac joint injection.  Risks and expected side effects (including, but not limited to, potential for failure of the procedure or worsening of pain, bleeding, infection, nerve injury, systemic reaction to the contrast, anesthetic, or steroid, anaphylactic shock, and elevation of blood sugar) as well as potential benefit of the procedure were reviewed with the patient and all concerns addressed.  The printed consent form was signed and witnessed. A standard time-out procedure was performed.         DESCRIPTION OF PROCEDURE:  The patient was brought to the fluoroscopy suite and placed in the prone position on the fluoroscopy table. The skin entry point for approaching the above documented SI joint was identified under the most advantageous fluoroscopic view and marked.  Following thorough Hibiclens preparation of the skin, sterile draping, and infiltration of the skin entry point and subcutaneous tissues with 1% Lidocaine, a 22 gauge spinal needle was placed under fluoroscopic guidance into the SI joint.  Intra-articular placement was confirmed by a clear arthrogram resulting from the injection of 0.25 ml Gadavist contrast material. A 5cc volume of solution containing 4cc 0.5% bupivacaine and 1cc methylprednisolone (40 mg/mL) was injected into the left sacroiliac joint. The stylet was replaced and the needle withdrawn.         Post-procedural instructions were provided and follow up plans discussed. The patient was instructed to contact the office if any problems any possible injection related problems or side effects were experienced. After having met discharge  criteria, the patient was discharged in good condition.          Comments: No complications

## 2022-02-08 DIAGNOSIS — J44.9 CHRONIC OBSTRUCTIVE PULMONARY DISEASE, UNSPECIFIED COPD TYPE (HCC): ICD-10-CM

## 2022-02-08 DIAGNOSIS — J30.1 ALLERGIC RHINITIS DUE TO POLLEN, UNSPECIFIED SEASONALITY: ICD-10-CM

## 2022-02-11 RX ORDER — DILTIAZEM HYDROCHLORIDE 60 MG/1
TABLET, FILM COATED ORAL
Qty: 33 G | Refills: 5 | Status: SHIPPED | OUTPATIENT
Start: 2022-02-11

## 2022-02-11 RX ORDER — MONTELUKAST SODIUM 10 MG/1
TABLET ORAL
Qty: 90 TABLET | Refills: 2 | Status: SHIPPED | OUTPATIENT
Start: 2022-02-11

## 2022-02-11 NOTE — TELEPHONE ENCOUNTER
Have we ever prescribed this med? Yes.  If yes, what date? 1/22/21    Last OV: 8/9/21    Next OV: N/A    DX: Allergic rhinitis due to pollen, unspecified seasonality ; COPD    Medications: Montelukast 10mg ; Symbicort 80-4.5mcg

## 2022-07-22 ENCOUNTER — HOSPITAL ENCOUNTER (INPATIENT)
Facility: REHABILITATION | Age: 67
End: 2022-07-22
Attending: PHYSICAL MEDICINE & REHABILITATION | Admitting: PHYSICAL MEDICINE & REHABILITATION
Payer: MEDICARE

## 2022-08-06 NOTE — CARE PLAN
- see above under bleeding esophageal varices for management   - Patient also iron deficient     Problem: Oxygenation:  Goal: Maintain adequate oxygenation dependent on patient condition    Intervention: Manage oxygen therapy by monitoring pulse oximetry and/or ABG values  Sp02 >92% on 3,5 LPM, patient on continuous oximetry  Intervention: Levels of oxygenation will improve to baseline  Patient didn't use supplemented home oxygen prior this admit      Problem: Bronchoconstriction:  Goal: Improve in air movement and diminished wheezing    Intervention: Evaluate and manage medication effects  Patient continues duonebs every 4 hours with symbicort MDI initiated last night.  Patient rinsed her mouth with water following the symbicort.  Patient assessed with implementing of therapies      Problem: Bronchopulmonary Hygiene:  Goal: Increase mobilization of retained secretions    Intervention: Perform bronchopulmonary therapy as indicated by assessment  Aerobika flutter used with nebulizers 4 times daily to aid with secretion expectoration

## 2022-09-09 ENCOUNTER — HOSPITAL ENCOUNTER (OUTPATIENT)
Facility: MEDICAL CENTER | Age: 67
End: 2022-09-09
Attending: FAMILY MEDICINE
Payer: COMMERCIAL

## 2022-09-09 PROCEDURE — 87070 CULTURE OTHR SPECIMN AEROBIC: CPT

## 2022-09-09 PROCEDURE — 87205 SMEAR GRAM STAIN: CPT

## 2022-09-09 PROCEDURE — 87077 CULTURE AEROBIC IDENTIFY: CPT

## 2022-09-09 PROCEDURE — 87186 SC STD MICRODIL/AGAR DIL: CPT

## 2022-09-10 LAB
GRAM STN SPEC: NORMAL
SIGNIFICANT IND 70042: NORMAL
SITE SITE: NORMAL
SOURCE SOURCE: NORMAL

## 2022-09-11 LAB
BACTERIA WND AEROBE CULT: ABNORMAL
BACTERIA WND AEROBE CULT: ABNORMAL
GRAM STN SPEC: ABNORMAL
SIGNIFICANT IND 70042: ABNORMAL
SITE SITE: ABNORMAL
SOURCE SOURCE: ABNORMAL

## 2022-11-11 ENCOUNTER — PATIENT MESSAGE (OUTPATIENT)
Dept: HEALTH INFORMATION MANAGEMENT | Facility: OTHER | Age: 67
End: 2022-11-11

## 2024-03-11 NOTE — ED NOTES
Dr. Lanier at bedside to examine and aspirate rt elbow.  
Dr. Santiago at bedside to discuss admit. Repeat BC and lactic drawn and sent to lab.   
Iv placed , labs bld cx x 1 drawn and taken to lab. poc update given to pt, results pending at this time  
Med rec complete per patient  Allergies reviewed    Patient stopped Zyrtec while she was taking Amoxicillin    
Rt paged for breathing tx  
pcxr done, flu swab to lab  
[FreeTextEntry2] : B/L shoulder Pain